# Patient Record
Sex: MALE | Race: WHITE | Employment: PART TIME | URBAN - METROPOLITAN AREA
[De-identification: names, ages, dates, MRNs, and addresses within clinical notes are randomized per-mention and may not be internally consistent; named-entity substitution may affect disease eponyms.]

---

## 2017-03-10 ENCOUNTER — HOSPITAL ENCOUNTER (OUTPATIENT)
Dept: SLEEP CENTER | Age: 72
Discharge: HOME | End: 2017-03-10
Attending: INTERNAL MEDICINE | Admitting: INTERNAL MEDICINE

## 2017-03-13 ENCOUNTER — HOSPITAL ENCOUNTER (OUTPATIENT)
Dept: SLEEP CENTER | Age: 72
Discharge: HOME | End: 2017-03-13
Attending: INTERNAL MEDICINE | Admitting: INTERNAL MEDICINE

## 2017-03-24 ENCOUNTER — HOSPITAL ENCOUNTER (OUTPATIENT)
Dept: SLEEP CENTER | Age: 72
Discharge: HOME | End: 2017-03-24
Attending: INTERNAL MEDICINE | Admitting: INTERNAL MEDICINE

## 2017-06-19 PROBLEM — Z99.89 OSA ON CPAP: Status: ACTIVE | Noted: 2017-06-19

## 2017-06-19 PROBLEM — G47.33 OSA ON CPAP: Status: ACTIVE | Noted: 2017-06-19

## 2019-09-11 ENCOUNTER — HOSPITAL ENCOUNTER (OUTPATIENT)
Dept: GENERAL RADIOLOGY | Age: 74
Discharge: HOME | End: 2019-09-11
Payer: MEDICARE

## 2019-09-11 DIAGNOSIS — R63.0 ALMOST NO APPETITE: ICD-10-CM

## 2019-09-11 PROCEDURE — 74240 X-RAY XM UPR GI TRC 1CNTRST: CPT

## 2020-01-01 ENCOUNTER — HOSPITAL ENCOUNTER (EMERGENCY)
Age: 75
Discharge: ANOTHER HOSPITAL | End: 2020-11-23
Attending: EMERGENCY MEDICINE
Payer: MEDICARE

## 2020-01-01 ENCOUNTER — NURSE TRIAGE (OUTPATIENT)
Dept: OTHER | Facility: CLINIC | Age: 75
End: 2020-01-01

## 2020-01-01 ENCOUNTER — APPOINTMENT (OUTPATIENT)
Dept: ULTRASOUND IMAGING | Age: 75
DRG: 870 | End: 2020-01-01
Attending: INTERNAL MEDICINE
Payer: MEDICARE

## 2020-01-01 ENCOUNTER — APPOINTMENT (OUTPATIENT)
Dept: GENERAL RADIOLOGY | Age: 75
DRG: 870 | End: 2020-01-01
Attending: INTERNAL MEDICINE
Payer: MEDICARE

## 2020-01-01 ENCOUNTER — ANESTHESIA (OUTPATIENT)
Dept: CARDIOVASCULAR ICU | Age: 75
DRG: 870 | End: 2020-01-01
Payer: MEDICARE

## 2020-01-01 ENCOUNTER — HOSPITAL ENCOUNTER (EMERGENCY)
Age: 75
Discharge: HOME | End: 2020-11-18
Attending: EMERGENCY MEDICINE
Payer: MEDICARE

## 2020-01-01 ENCOUNTER — APPOINTMENT (OUTPATIENT)
Dept: GENERAL RADIOLOGY | Age: 75
End: 2020-01-01
Payer: MEDICARE

## 2020-01-01 ENCOUNTER — CARE COORDINATION (OUTPATIENT)
Dept: CARE COORDINATION | Age: 75
End: 2020-01-01

## 2020-01-01 ENCOUNTER — APPOINTMENT (OUTPATIENT)
Dept: INTERVENTIONAL RADIOLOGY/VASCULAR | Age: 75
DRG: 870 | End: 2020-01-01
Attending: INTERNAL MEDICINE
Payer: MEDICARE

## 2020-01-01 ENCOUNTER — ANESTHESIA EVENT (OUTPATIENT)
Dept: CARDIOVASCULAR ICU | Age: 75
DRG: 870 | End: 2020-01-01
Payer: MEDICARE

## 2020-01-01 ENCOUNTER — HOSPITAL ENCOUNTER (INPATIENT)
Age: 75
LOS: 14 days | DRG: 870 | End: 2020-12-07
Attending: INTERNAL MEDICINE | Admitting: INTERNAL MEDICINE
Payer: MEDICARE

## 2020-01-01 ENCOUNTER — APPOINTMENT (OUTPATIENT)
Dept: CT IMAGING | Age: 75
End: 2020-01-01
Payer: MEDICARE

## 2020-01-01 VITALS
WEIGHT: 215 LBS | HEIGHT: 71 IN | TEMPERATURE: 98.9 F | DIASTOLIC BLOOD PRESSURE: 70 MMHG | HEART RATE: 81 BPM | OXYGEN SATURATION: 95 % | RESPIRATION RATE: 16 BRPM | SYSTOLIC BLOOD PRESSURE: 125 MMHG | BODY MASS INDEX: 30.1 KG/M2

## 2020-01-01 VITALS
OXYGEN SATURATION: 95 % | SYSTOLIC BLOOD PRESSURE: 115 MMHG | HEART RATE: 94 BPM | HEIGHT: 71 IN | WEIGHT: 215 LBS | DIASTOLIC BLOOD PRESSURE: 65 MMHG | RESPIRATION RATE: 27 BRPM | TEMPERATURE: 98.9 F | BODY MASS INDEX: 30.1 KG/M2

## 2020-01-01 VITALS
HEIGHT: 71 IN | SYSTOLIC BLOOD PRESSURE: 110 MMHG | OXYGEN SATURATION: 100 % | TEMPERATURE: 98.2 F | BODY MASS INDEX: 31.3 KG/M2 | RESPIRATION RATE: 22 BRPM | DIASTOLIC BLOOD PRESSURE: 52 MMHG | HEART RATE: 76 BPM | WEIGHT: 223.55 LBS

## 2020-01-01 LAB
1,3 BETA-D-GLUCAN INTERP: NEGATIVE
1,3 BETA-D-GLUCAN: <31 PG/ML
ABO/RH: NORMAL
ALBUMIN SERPL-MCNC: 1.8 GM/DL (ref 3.4–5)
ALBUMIN SERPL-MCNC: 2.2 GM/DL (ref 3.4–5)
ALBUMIN SERPL-MCNC: 2.4 GM/DL (ref 3.4–5)
ALBUMIN SERPL-MCNC: 2.5 GM/DL (ref 3.4–5)
ALBUMIN SERPL-MCNC: 2.8 GM/DL (ref 3.4–5)
ALBUMIN SERPL-MCNC: 2.9 GM/DL (ref 3.4–5)
ALBUMIN SERPL-MCNC: 3 GM/DL (ref 3.4–5)
ALBUMIN SERPL-MCNC: 3.1 GM/DL (ref 3.4–5)
ALBUMIN SERPL-MCNC: 3.2 GM/DL (ref 3.4–5)
ALBUMIN SERPL-MCNC: 3.2 GM/DL (ref 3.4–5)
ALBUMIN SERPL-MCNC: 3.3 GM/DL (ref 3.4–5)
ALBUMIN SERPL-MCNC: 3.5 GM/DL (ref 3.4–5)
ALP BLD-CCNC: 109 IU/L (ref 40–129)
ALP BLD-CCNC: 110 IU/L (ref 40–128)
ALP BLD-CCNC: 113 IU/L (ref 40–128)
ALP BLD-CCNC: 127 IU/L (ref 40–128)
ALP BLD-CCNC: 131 IU/L (ref 40–128)
ALP BLD-CCNC: 132 IU/L (ref 40–128)
ALP BLD-CCNC: 140 IU/L (ref 40–128)
ALP BLD-CCNC: 142 IU/L (ref 40–128)
ALP BLD-CCNC: 146 IU/L (ref 40–128)
ALP BLD-CCNC: 146 IU/L (ref 40–128)
ALP BLD-CCNC: 149 IU/L (ref 40–128)
ALP BLD-CCNC: 151 IU/L (ref 40–128)
ALP BLD-CCNC: 156 IU/L (ref 40–128)
ALP BLD-CCNC: 164 IU/L (ref 40–129)
ALP BLD-CCNC: 175 IU/L (ref 40–128)
ALP BLD-CCNC: 186 IU/L (ref 40–129)
ALP BLD-CCNC: 98 IU/L (ref 40–128)
ALT SERPL-CCNC: 33 U/L (ref 10–40)
ALT SERPL-CCNC: 35 U/L (ref 10–40)
ALT SERPL-CCNC: 38 U/L (ref 10–40)
ALT SERPL-CCNC: 39 U/L (ref 10–40)
ALT SERPL-CCNC: 39 U/L (ref 10–40)
ALT SERPL-CCNC: 40 U/L (ref 10–40)
ALT SERPL-CCNC: 42 U/L (ref 10–40)
ALT SERPL-CCNC: 43 U/L (ref 10–40)
ALT SERPL-CCNC: 44 U/L (ref 10–40)
ALT SERPL-CCNC: 47 U/L (ref 10–40)
ALT SERPL-CCNC: 50 U/L (ref 10–40)
ALT SERPL-CCNC: 52 U/L (ref 10–40)
ALT SERPL-CCNC: 55 U/L (ref 10–40)
ALT SERPL-CCNC: 58 U/L (ref 10–40)
ALT SERPL-CCNC: 68 U/L (ref 10–40)
ANION GAP SERPL CALCULATED.3IONS-SCNC: 10 MMOL/L (ref 4–16)
ANION GAP SERPL CALCULATED.3IONS-SCNC: 11 MMOL/L (ref 4–16)
ANION GAP SERPL CALCULATED.3IONS-SCNC: 11 MMOL/L (ref 4–16)
ANION GAP SERPL CALCULATED.3IONS-SCNC: 12 MMOL/L (ref 4–16)
ANION GAP SERPL CALCULATED.3IONS-SCNC: 13 MMOL/L (ref 4–16)
ANION GAP SERPL CALCULATED.3IONS-SCNC: 14 MMOL/L (ref 4–16)
ANION GAP SERPL CALCULATED.3IONS-SCNC: 15 MMOL/L (ref 4–16)
ANION GAP SERPL CALCULATED.3IONS-SCNC: 16 MMOL/L (ref 4–16)
ANION GAP SERPL CALCULATED.3IONS-SCNC: 16 MMOL/L (ref 4–16)
ANION GAP SERPL CALCULATED.3IONS-SCNC: 17 MMOL/L (ref 4–16)
ANION GAP SERPL CALCULATED.3IONS-SCNC: 18 MMOL/L (ref 4–16)
ANION GAP SERPL CALCULATED.3IONS-SCNC: 19 MMOL/L (ref 4–16)
ANION GAP SERPL CALCULATED.3IONS-SCNC: 21 MMOL/L (ref 4–16)
ANION GAP SERPL CALCULATED.3IONS-SCNC: 21 MMOL/L (ref 4–16)
ANION GAP SERPL CALCULATED.3IONS-SCNC: 22 MMOL/L (ref 4–16)
ANION GAP SERPL CALCULATED.3IONS-SCNC: 22 MMOL/L (ref 4–16)
ANION GAP SERPL CALCULATED.3IONS-SCNC: 5 MMOL/L (ref 4–16)
ANION GAP SERPL CALCULATED.3IONS-SCNC: 8 MMOL/L (ref 4–16)
ANISOCYTOSIS: ABNORMAL
ANTIBODY SCREEN: NEGATIVE
APTT: 123.1 SECONDS (ref 25.1–37.1)
APTT: 22.3 SECONDS (ref 25.1–37.1)
APTT: 25.6 SECONDS (ref 25.1–37.1)
APTT: 27.6 SECONDS (ref 25.1–37.1)
APTT: 29 SECONDS (ref 25.1–37.1)
APTT: 29.7 SECONDS (ref 25.1–37.1)
APTT: 29.7 SECONDS (ref 25.1–37.1)
APTT: 30 SECONDS (ref 25.1–37.1)
APTT: 30.1 SECONDS (ref 25.1–37.1)
APTT: 30.5 SECONDS (ref 25.1–37.1)
APTT: 31.2 SECONDS (ref 25.1–37.1)
APTT: 32.6 SECONDS (ref 25.1–37.1)
APTT: 34.4 SECONDS (ref 25.1–37.1)
APTT: 43.8 SECONDS (ref 25.1–37.1)
APTT: 81.2 SECONDS (ref 25.1–37.1)
AST SERPL-CCNC: 115 IU/L (ref 15–37)
AST SERPL-CCNC: 121 IU/L (ref 15–37)
AST SERPL-CCNC: 48 IU/L (ref 15–37)
AST SERPL-CCNC: 49 IU/L (ref 15–37)
AST SERPL-CCNC: 49 IU/L (ref 15–37)
AST SERPL-CCNC: 51 IU/L (ref 15–37)
AST SERPL-CCNC: 53 IU/L (ref 15–37)
AST SERPL-CCNC: 54 IU/L (ref 15–37)
AST SERPL-CCNC: 64 IU/L (ref 15–37)
AST SERPL-CCNC: 69 IU/L (ref 15–37)
AST SERPL-CCNC: 70 IU/L (ref 15–37)
AST SERPL-CCNC: 79 IU/L (ref 15–37)
AST SERPL-CCNC: 82 IU/L (ref 15–37)
AST SERPL-CCNC: 83 IU/L (ref 15–37)
AST SERPL-CCNC: 91 IU/L (ref 15–37)
AST SERPL-CCNC: 92 IU/L (ref 15–37)
AST SERPL-CCNC: 95 IU/L (ref 15–37)
BACTERIA: ABNORMAL /HPF
BACTERIA: ABNORMAL /HPF
BANDED NEUTROPHILS ABSOLUTE COUNT: 0.68 K/CU MM
BANDED NEUTROPHILS ABSOLUTE COUNT: 0.79 K/CU MM
BANDED NEUTROPHILS ABSOLUTE COUNT: 1.62 K/CU MM
BANDED NEUTROPHILS ABSOLUTE COUNT: 1.67 K/CU MM
BANDED NEUTROPHILS ABSOLUTE COUNT: 1.68 K/CU MM
BANDED NEUTROPHILS ABSOLUTE COUNT: 1.74 K/CU MM
BANDED NEUTROPHILS ABSOLUTE COUNT: 1.81 K/CU MM
BANDED NEUTROPHILS ABSOLUTE COUNT: 2.29 K/CU MM
BANDED NEUTROPHILS ABSOLUTE COUNT: 2.3 K/CU MM
BANDED NEUTROPHILS ABSOLUTE COUNT: 2.44 K/CU MM
BANDED NEUTROPHILS ABSOLUTE COUNT: 2.58 K/CU MM
BANDED NEUTROPHILS ABSOLUTE COUNT: 2.71 K/CU MM
BANDED NEUTROPHILS ABSOLUTE COUNT: 3.14 K/CU MM
BANDED NEUTROPHILS RELATIVE PERCENT: 10 % (ref 5–11)
BANDED NEUTROPHILS RELATIVE PERCENT: 10 % (ref 5–11)
BANDED NEUTROPHILS RELATIVE PERCENT: 12 % (ref 5–11)
BANDED NEUTROPHILS RELATIVE PERCENT: 13 % (ref 5–11)
BANDED NEUTROPHILS RELATIVE PERCENT: 17 % (ref 5–11)
BANDED NEUTROPHILS RELATIVE PERCENT: 2 % (ref 5–11)
BANDED NEUTROPHILS RELATIVE PERCENT: 5 % (ref 5–11)
BANDED NEUTROPHILS RELATIVE PERCENT: 5 % (ref 5–11)
BANDED NEUTROPHILS RELATIVE PERCENT: 8 % (ref 5–11)
BANDED NEUTROPHILS RELATIVE PERCENT: 9 % (ref 5–11)
BANDED NEUTROPHILS RELATIVE PERCENT: 9 % (ref 5–11)
BASE EXCESS MIXED: 1 (ref 0–1.2)
BASE EXCESS MIXED: 1.1 (ref 0–1.2)
BASE EXCESS: 0 (ref 0–3.3)
BASE EXCESS: 0 (ref 0–3.3)
BASE EXCESS: 1 (ref 0–3.3)
BASE EXCESS: 10 (ref 0–3.3)
BASE EXCESS: 13 (ref 0–3.3)
BASE EXCESS: 2 (ref 0–3.3)
BASE EXCESS: 3 (ref 0–3.3)
BASE EXCESS: 3 (ref 0–3.3)
BASE EXCESS: 4 (ref 0–3.3)
BASE EXCESS: 5 (ref 0–3.3)
BASE EXCESS: 5 (ref 0–3.3)
BASE EXCESS: 6 (ref 0–3.3)
BASE EXCESS: 6 (ref 0–3.3)
BASE EXCESS: 7 (ref 0–3.3)
BASE EXCESS: 8 (ref 0–3.3)
BASE EXCESS: 9 (ref 0–3.3)
BASE EXCESS: 9 (ref 0–3.3)
BASE EXCESS: ABNORMAL (ref 0–3.3)
BASOPHILS ABSOLUTE: 0 K/CU MM
BASOPHILS ABSOLUTE: 0.2 K/CU MM
BASOPHILS RELATIVE PERCENT: 0.2 % (ref 0–1)
BASOPHILS RELATIVE PERCENT: 0.3 % (ref 0–1)
BASOPHILS RELATIVE PERCENT: 1 % (ref 0–1)
BILIRUB SERPL-MCNC: 0.3 MG/DL (ref 0–1)
BILIRUB SERPL-MCNC: 0.4 MG/DL (ref 0–1)
BILIRUB SERPL-MCNC: 0.5 MG/DL (ref 0–1)
BILIRUB SERPL-MCNC: 1 MG/DL (ref 0–1)
BILIRUBIN URINE: NEGATIVE MG/DL
BILIRUBIN URINE: NEGATIVE MG/DL
BLOOD, URINE: ABNORMAL
BLOOD, URINE: ABNORMAL
BUN BLDV-MCNC: 109 MG/DL (ref 6–23)
BUN BLDV-MCNC: 133 MG/DL (ref 6–23)
BUN BLDV-MCNC: 134 MG/DL (ref 6–23)
BUN BLDV-MCNC: 18 MG/DL (ref 6–23)
BUN BLDV-MCNC: 23 MG/DL (ref 6–23)
BUN BLDV-MCNC: 23 MG/DL (ref 6–23)
BUN BLDV-MCNC: 24 MG/DL (ref 6–23)
BUN BLDV-MCNC: 28 MG/DL (ref 6–23)
BUN BLDV-MCNC: 29 MG/DL (ref 6–23)
BUN BLDV-MCNC: 31 MG/DL (ref 6–23)
BUN BLDV-MCNC: 32 MG/DL (ref 6–23)
BUN BLDV-MCNC: 33 MG/DL (ref 6–23)
BUN BLDV-MCNC: 33 MG/DL (ref 6–23)
BUN BLDV-MCNC: 36 MG/DL (ref 6–23)
BUN BLDV-MCNC: 36 MG/DL (ref 6–23)
BUN BLDV-MCNC: 37 MG/DL (ref 6–23)
BUN BLDV-MCNC: 38 MG/DL (ref 6–23)
BUN BLDV-MCNC: 39 MG/DL (ref 6–23)
BUN BLDV-MCNC: 41 MG/DL (ref 6–23)
BUN BLDV-MCNC: 43 MG/DL (ref 6–23)
BUN BLDV-MCNC: 44 MG/DL (ref 6–23)
BUN BLDV-MCNC: 46 MG/DL (ref 6–23)
BUN BLDV-MCNC: 49 MG/DL (ref 6–23)
BUN BLDV-MCNC: 51 MG/DL (ref 6–23)
BUN BLDV-MCNC: 55 MG/DL (ref 6–23)
BUN BLDV-MCNC: 57 MG/DL (ref 6–23)
BUN BLDV-MCNC: 58 MG/DL (ref 6–23)
BUN BLDV-MCNC: 65 MG/DL (ref 6–23)
BUN BLDV-MCNC: 73 MG/DL (ref 6–23)
BUN BLDV-MCNC: 74 MG/DL (ref 6–23)
BUN BLDV-MCNC: 77 MG/DL (ref 6–23)
BUN BLDV-MCNC: 79 MG/DL (ref 6–23)
BUN BLDV-MCNC: 89 MG/DL (ref 6–23)
BUN BLDV-MCNC: 99 MG/DL (ref 6–23)
BURR CELLS: ABNORMAL
BURR CELLS: ABNORMAL
CALCIUM IONIZED: 3.48 MG/DL (ref 4.48–5.28)
CALCIUM IONIZED: 3.68 MG/DL (ref 4.48–5.28)
CALCIUM IONIZED: 3.72 MG/DL (ref 4.48–5.28)
CALCIUM IONIZED: 3.8 MG/DL (ref 4.48–5.28)
CALCIUM IONIZED: 3.8 MG/DL (ref 4.48–5.28)
CALCIUM IONIZED: 3.84 MG/DL (ref 4.48–5.28)
CALCIUM IONIZED: 3.88 MG/DL (ref 4.48–5.28)
CALCIUM IONIZED: 3.92 MG/DL (ref 4.48–5.28)
CALCIUM IONIZED: 3.96 MG/DL (ref 4.48–5.28)
CALCIUM IONIZED: 3.96 MG/DL (ref 4.48–5.28)
CALCIUM IONIZED: 4 MG/DL (ref 4.48–5.28)
CALCIUM IONIZED: 4 MG/DL (ref 4.48–5.28)
CALCIUM IONIZED: 4.04 MG/DL (ref 4.48–5.28)
CALCIUM IONIZED: 4.04 MG/DL (ref 4.48–5.28)
CALCIUM IONIZED: 4.08 MG/DL (ref 4.48–5.28)
CALCIUM IONIZED: 4.12 MG/DL (ref 4.48–5.28)
CALCIUM IONIZED: 4.16 MG/DL (ref 4.48–5.28)
CALCIUM IONIZED: 4.2 MG/DL (ref 4.48–5.28)
CALCIUM IONIZED: 4.24 MG/DL (ref 4.48–5.28)
CALCIUM IONIZED: 4.24 MG/DL (ref 4.48–5.28)
CALCIUM IONIZED: 4.36 MG/DL (ref 4.48–5.28)
CALCIUM SERPL-MCNC: 5.7 MG/DL (ref 8.3–10.6)
CALCIUM SERPL-MCNC: 6.4 MG/DL (ref 8.3–10.6)
CALCIUM SERPL-MCNC: 6.7 MG/DL (ref 8.3–10.6)
CALCIUM SERPL-MCNC: 6.9 MG/DL (ref 8.3–10.6)
CALCIUM SERPL-MCNC: 7 MG/DL (ref 8.3–10.6)
CALCIUM SERPL-MCNC: 7.1 MG/DL (ref 8.3–10.6)
CALCIUM SERPL-MCNC: 7.2 MG/DL (ref 8.3–10.6)
CALCIUM SERPL-MCNC: 7.2 MG/DL (ref 8.3–10.6)
CALCIUM SERPL-MCNC: 7.3 MG/DL (ref 8.3–10.6)
CALCIUM SERPL-MCNC: 7.4 MG/DL (ref 8.3–10.6)
CALCIUM SERPL-MCNC: 7.5 MG/DL (ref 8.3–10.6)
CALCIUM SERPL-MCNC: 7.6 MG/DL (ref 8.3–10.6)
CALCIUM SERPL-MCNC: 7.6 MG/DL (ref 8.3–10.6)
CALCIUM SERPL-MCNC: 7.7 MG/DL (ref 8.3–10.6)
CALCIUM SERPL-MCNC: 7.8 MG/DL (ref 8.3–10.6)
CALCIUM SERPL-MCNC: 7.9 MG/DL (ref 8.3–10.6)
CALCIUM SERPL-MCNC: 7.9 MG/DL (ref 8.3–10.6)
CALCIUM SERPL-MCNC: 8 MG/DL (ref 8.3–10.6)
CALCIUM SERPL-MCNC: 8.2 MG/DL (ref 8.3–10.6)
CARBON MONOXIDE, BLOOD: 1.5 % (ref 0–5)
CARBON MONOXIDE, BLOOD: 1.5 % (ref 0–5)
CARBON MONOXIDE, BLOOD: 1.6 % (ref 0–5)
CARBON MONOXIDE, BLOOD: 1.6 % (ref 0–5)
CARBON MONOXIDE, BLOOD: 1.7 % (ref 0–5)
CARBON MONOXIDE, BLOOD: 1.8 % (ref 0–5)
CARBON MONOXIDE, BLOOD: 1.9 % (ref 0–5)
CARBON MONOXIDE, BLOOD: 1.9 % (ref 0–5)
CARBON MONOXIDE, BLOOD: 2 % (ref 0–5)
CARBON MONOXIDE, BLOOD: 2.1 % (ref 0–5)
CARBON MONOXIDE, BLOOD: 2.1 % (ref 0–5)
CARBON MONOXIDE, BLOOD: 2.2 % (ref 0–5)
CARBON MONOXIDE, BLOOD: 2.6 % (ref 0–5)
CAST TYPE: ABNORMAL /HPF
CAST TYPE: NEGATIVE /HPF
CHLORIDE BLD-SCNC: 101 MMOL/L (ref 99–110)
CHLORIDE BLD-SCNC: 101 MMOL/L (ref 99–110)
CHLORIDE BLD-SCNC: 103 MMOL/L (ref 99–110)
CHLORIDE BLD-SCNC: 104 MMOL/L (ref 99–110)
CHLORIDE BLD-SCNC: 105 MMOL/L (ref 99–110)
CHLORIDE BLD-SCNC: 106 MMOL/L (ref 99–110)
CHLORIDE BLD-SCNC: 91 MMOL/L (ref 99–110)
CHLORIDE BLD-SCNC: 91 MMOL/L (ref 99–110)
CHLORIDE BLD-SCNC: 92 MMOL/L (ref 99–110)
CHLORIDE BLD-SCNC: 92 MMOL/L (ref 99–110)
CHLORIDE BLD-SCNC: 93 MMOL/L (ref 99–110)
CHLORIDE BLD-SCNC: 94 MMOL/L (ref 99–110)
CHLORIDE BLD-SCNC: 95 MMOL/L (ref 99–110)
CHLORIDE BLD-SCNC: 95 MMOL/L (ref 99–110)
CHLORIDE BLD-SCNC: 96 MMOL/L (ref 99–110)
CHLORIDE BLD-SCNC: 97 MMOL/L (ref 99–110)
CHLORIDE BLD-SCNC: 98 MMOL/L (ref 99–110)
CHLORIDE BLD-SCNC: 99 MMOL/L (ref 99–110)
CLARITY: ABNORMAL
CLARITY: CLEAR
CO2 CONTENT: 18.7 MMOL/L (ref 19–24)
CO2 CONTENT: 18.9 MMOL/L (ref 19–24)
CO2 CONTENT: 19.7 MMOL/L (ref 19–24)
CO2 CONTENT: 20.9 MMOL/L (ref 19–24)
CO2 CONTENT: 21.8 MMOL/L (ref 19–24)
CO2 CONTENT: 22 MMOL/L (ref 19–24)
CO2 CONTENT: 22.3 MMOL/L (ref 19–24)
CO2 CONTENT: 22.5 MMOL/L (ref 19–24)
CO2 CONTENT: 22.8 MMOL/L (ref 19–24)
CO2 CONTENT: 23.4 MMOL/L (ref 19–24)
CO2 CONTENT: 23.4 MMOL/L (ref 19–24)
CO2 CONTENT: 23.5 MMOL/L (ref 19–24)
CO2 CONTENT: 23.6 MMOL/L (ref 19–24)
CO2 CONTENT: 24.9 MMOL/L (ref 19–24)
CO2 CONTENT: 25.1 MMOL/L (ref 19–24)
CO2 CONTENT: 26.2 MMOL/L (ref 19–24)
CO2 CONTENT: 26.4 MMOL/L (ref 19–24)
CO2 CONTENT: 26.5 MMOL/L (ref 19–24)
CO2 CONTENT: 27.9 MMOL/L (ref 19–24)
CO2 CONTENT: 28.9 MMOL/L (ref 19–24)
CO2 CONTENT: 30.5 MMOL/L (ref 19–24)
CO2: 16 MMOL/L (ref 21–32)
CO2: 16 MMOL/L (ref 21–32)
CO2: 17 MMOL/L (ref 21–32)
CO2: 17 MMOL/L (ref 21–32)
CO2: 18 MMOL/L (ref 21–32)
CO2: 19 MMOL/L (ref 21–32)
CO2: 20 MMOL/L (ref 21–32)
CO2: 21 MMOL/L (ref 21–32)
CO2: 21 MMOL/L (ref 21–32)
CO2: 22 MMOL/L (ref 21–32)
CO2: 23 MMOL/L (ref 21–32)
CO2: 24 MMOL/L (ref 21–32)
CO2: 25 MMOL/L (ref 21–32)
CO2: 26 MMOL/L (ref 21–32)
CO2: 26 MMOL/L (ref 21–32)
CO2: 31 MMOL/L (ref 21–32)
CO2: 34 MMOL/L (ref 21–32)
COLOR: YELLOW
COLOR: YELLOW
COMMENT: ABNORMAL
COMPONENT: NORMAL
CREAT SERPL-MCNC: 0.9 MG/DL (ref 0.9–1.3)
CREAT SERPL-MCNC: 1 MG/DL (ref 0.9–1.3)
CREAT SERPL-MCNC: 1 MG/DL (ref 0.9–1.3)
CREAT SERPL-MCNC: 1.1 MG/DL (ref 0.9–1.3)
CREAT SERPL-MCNC: 1.1 MG/DL (ref 0.9–1.3)
CREAT SERPL-MCNC: 1.2 MG/DL (ref 0.9–1.3)
CREAT SERPL-MCNC: 1.3 MG/DL (ref 0.9–1.3)
CREAT SERPL-MCNC: 1.3 MG/DL (ref 0.9–1.3)
CREAT SERPL-MCNC: 1.4 MG/DL (ref 0.9–1.3)
CREAT SERPL-MCNC: 1.5 MG/DL (ref 0.9–1.3)
CREAT SERPL-MCNC: 1.6 MG/DL (ref 0.9–1.3)
CREAT SERPL-MCNC: 1.7 MG/DL (ref 0.9–1.3)
CREAT SERPL-MCNC: 1.7 MG/DL (ref 0.9–1.3)
CREAT SERPL-MCNC: 1.8 MG/DL (ref 0.9–1.3)
CREAT SERPL-MCNC: 1.9 MG/DL (ref 0.9–1.3)
CREAT SERPL-MCNC: 1.9 MG/DL (ref 0.9–1.3)
CREAT SERPL-MCNC: 2 MG/DL (ref 0.9–1.3)
CREAT SERPL-MCNC: 2.1 MG/DL (ref 0.9–1.3)
CREAT SERPL-MCNC: 2.3 MG/DL (ref 0.9–1.3)
CREAT SERPL-MCNC: 2.4 MG/DL (ref 0.9–1.3)
CREAT SERPL-MCNC: 2.5 MG/DL (ref 0.9–1.3)
CREAT SERPL-MCNC: 2.5 MG/DL (ref 0.9–1.3)
CREAT SERPL-MCNC: 3 MG/DL (ref 0.9–1.3)
CREAT SERPL-MCNC: 3 MG/DL (ref 0.9–1.3)
CREAT SERPL-MCNC: 3.2 MG/DL (ref 0.9–1.3)
CREAT SERPL-MCNC: 3.3 MG/DL (ref 0.9–1.3)
CREAT SERPL-MCNC: 3.7 MG/DL (ref 0.9–1.3)
CREAT SERPL-MCNC: 4.1 MG/DL (ref 0.9–1.3)
CREATININE URINE: 102.7 MG/DL (ref 39–259)
CRYSTAL TYPE: ABNORMAL /HPF
CRYSTAL TYPE: NEGATIVE /HPF
CULTURE: ABNORMAL
CULTURE: ABNORMAL
CULTURE: NORMAL
D DIMER: 1363 NG/ML(DDU)
D DIMER: 2415 NG/ML(DDU)
D DIMER: 4040 NG/ML(DDU)
D DIMER: 4319 NG/ML(DDU)
D DIMER: 4876 NG/ML(DDU)
D DIMER: 4995 NG/ML(DDU)
D DIMER: 5186 NG/ML(DDU)
D DIMER: 680 NG/ML(DDU)
D DIMER: 744 NG/ML(DDU)
D DIMER: >5250 NG/ML(DDU)
DIFFERENTIAL TYPE: ABNORMAL
DOHLE BODIES: PRESENT
DOSE AMOUNT: ABNORMAL
DOSE AMOUNT: NORMAL
DOSE TIME: ABNORMAL
DOSE TIME: NORMAL
EKG ATRIAL RATE: 109 BPM
EKG ATRIAL RATE: 153 BPM
EKG ATRIAL RATE: 54 BPM
EKG ATRIAL RATE: 68 BPM
EKG DIAGNOSIS: NORMAL
EKG P AXIS: 37 DEGREES
EKG P AXIS: 43 DEGREES
EKG P AXIS: 65 DEGREES
EKG P-R INTERVAL: 132 MS
EKG P-R INTERVAL: 148 MS
EKG P-R INTERVAL: 150 MS
EKG Q-T INTERVAL: 314 MS
EKG Q-T INTERVAL: 398 MS
EKG Q-T INTERVAL: 458 MS
EKG Q-T INTERVAL: 464 MS
EKG QRS DURATION: 132 MS
EKG QRS DURATION: 70 MS
EKG QRS DURATION: 80 MS
EKG QRS DURATION: 86 MS
EKG QTC CALCULATION (BAZETT): 422 MS
EKG QTC CALCULATION (BAZETT): 423 MS
EKG QTC CALCULATION (BAZETT): 434 MS
EKG QTC CALCULATION (BAZETT): 508 MS
EKG R AXIS: -64 DEGREES
EKG R AXIS: 42 DEGREES
EKG R AXIS: 74 DEGREES
EKG R AXIS: 84 DEGREES
EKG T AXIS: 111 DEGREES
EKG T AXIS: 26 DEGREES
EKG T AXIS: 66 DEGREES
EKG T AXIS: 67 DEGREES
EKG VENTRICULAR RATE: 109 BPM
EKG VENTRICULAR RATE: 54 BPM
EKG VENTRICULAR RATE: 68 BPM
EKG VENTRICULAR RATE: 72 BPM
EOSINOPHILS ABSOLUTE: 0 K/CU MM
EOSINOPHILS ABSOLUTE: 0.2 K/CU MM
EOSINOPHILS ABSOLUTE: 0.2 K/CU MM
EOSINOPHILS ABSOLUTE: 0.3 K/CU MM
EOSINOPHILS RELATIVE PERCENT: 0 % (ref 0–3)
EOSINOPHILS RELATIVE PERCENT: 0.1 % (ref 0–3)
EOSINOPHILS RELATIVE PERCENT: 0.1 % (ref 0–3)
EOSINOPHILS RELATIVE PERCENT: 0.8 % (ref 0–3)
EOSINOPHILS RELATIVE PERCENT: 1 % (ref 0–3)
EOSINOPHILS RELATIVE PERCENT: 1 % (ref 0–3)
EPITHELIAL CELLS, UA: ABNORMAL /HPF
EPITHELIAL CELLS, UA: ABNORMAL /HPF
FERRITIN: 2144 NG/ML (ref 30–400)
FIBRINOGEN LEVEL: 174 MG/DL (ref 196.9–442.1)
FIBRINOGEN LEVEL: 177 MG/DL (ref 196.9–442.1)
FIBRINOGEN LEVEL: 181 MG/DL (ref 196.9–442.1)
FIBRINOGEN LEVEL: 189 MG/DL (ref 196.9–442.1)
FIBRINOGEN LEVEL: 194 MG/DL (ref 196.9–442.1)
FIBRINOGEN LEVEL: 196 MG/DL (ref 196.9–442.1)
FIBRINOGEN LEVEL: 203 MG/DL (ref 196.9–442.1)
FIBRINOGEN LEVEL: 217 MG/DL (ref 196.9–442.1)
FIBRINOGEN LEVEL: 219 MG/DL (ref 196.9–442.1)
FIBRINOGEN LEVEL: 223 MG/DL (ref 196.9–442.1)
FIBRINOGEN LEVEL: 254 MG/DL (ref 196.9–442.1)
FIBRINOGEN LEVEL: 298 MG/DL (ref 196.9–442.1)
FIBRINOGEN LEVEL: 429 MG/DL (ref 196.9–442.1)
FIBRINOGEN LEVEL: 504 MG/DL (ref 196.9–442.1)
FIBRINOGEN LEVEL: 653 MG/DL (ref 196.9–442.1)
GFR AFRICAN AMERICAN: 17 ML/MIN/1.73M2
GFR AFRICAN AMERICAN: 19 ML/MIN/1.73M2
GFR AFRICAN AMERICAN: 22 ML/MIN/1.73M2
GFR AFRICAN AMERICAN: 23 ML/MIN/1.73M2
GFR AFRICAN AMERICAN: 25 ML/MIN/1.73M2
GFR AFRICAN AMERICAN: 25 ML/MIN/1.73M2
GFR AFRICAN AMERICAN: 31 ML/MIN/1.73M2
GFR AFRICAN AMERICAN: 31 ML/MIN/1.73M2
GFR AFRICAN AMERICAN: 32 ML/MIN/1.73M2
GFR AFRICAN AMERICAN: 34 ML/MIN/1.73M2
GFR AFRICAN AMERICAN: 37 ML/MIN/1.73M2
GFR AFRICAN AMERICAN: 40 ML/MIN/1.73M2
GFR AFRICAN AMERICAN: 42 ML/MIN/1.73M2
GFR AFRICAN AMERICAN: 42 ML/MIN/1.73M2
GFR AFRICAN AMERICAN: 45 ML/MIN/1.73M2
GFR AFRICAN AMERICAN: 48 ML/MIN/1.73M2
GFR AFRICAN AMERICAN: 48 ML/MIN/1.73M2
GFR AFRICAN AMERICAN: 51 ML/MIN/1.73M2
GFR AFRICAN AMERICAN: 55 ML/MIN/1.73M2
GFR AFRICAN AMERICAN: 60 ML/MIN/1.73M2
GFR AFRICAN AMERICAN: >60 ML/MIN/1.73M2
GFR NON-AFRICAN AMERICAN: 14 ML/MIN/1.73M2
GFR NON-AFRICAN AMERICAN: 16 ML/MIN/1.73M2
GFR NON-AFRICAN AMERICAN: 18 ML/MIN/1.73M2
GFR NON-AFRICAN AMERICAN: 19 ML/MIN/1.73M2
GFR NON-AFRICAN AMERICAN: 21 ML/MIN/1.73M2
GFR NON-AFRICAN AMERICAN: 21 ML/MIN/1.73M2
GFR NON-AFRICAN AMERICAN: 25 ML/MIN/1.73M2
GFR NON-AFRICAN AMERICAN: 25 ML/MIN/1.73M2
GFR NON-AFRICAN AMERICAN: 27 ML/MIN/1.73M2
GFR NON-AFRICAN AMERICAN: 28 ML/MIN/1.73M2
GFR NON-AFRICAN AMERICAN: 31 ML/MIN/1.73M2
GFR NON-AFRICAN AMERICAN: 33 ML/MIN/1.73M2
GFR NON-AFRICAN AMERICAN: 35 ML/MIN/1.73M2
GFR NON-AFRICAN AMERICAN: 35 ML/MIN/1.73M2
GFR NON-AFRICAN AMERICAN: 37 ML/MIN/1.73M2
GFR NON-AFRICAN AMERICAN: 39 ML/MIN/1.73M2
GFR NON-AFRICAN AMERICAN: 39 ML/MIN/1.73M2
GFR NON-AFRICAN AMERICAN: 42 ML/MIN/1.73M2
GFR NON-AFRICAN AMERICAN: 46 ML/MIN/1.73M2
GFR NON-AFRICAN AMERICAN: 49 ML/MIN/1.73M2
GFR NON-AFRICAN AMERICAN: 54 ML/MIN/1.73M2
GFR NON-AFRICAN AMERICAN: 54 ML/MIN/1.73M2
GFR NON-AFRICAN AMERICAN: 59 ML/MIN/1.73M2
GFR NON-AFRICAN AMERICAN: >60 ML/MIN/1.73M2
GIANT PLATELETS: PRESENT
GLUCOSE BLD-MCNC: 100 MG/DL (ref 70–99)
GLUCOSE BLD-MCNC: 103 MG/DL (ref 70–99)
GLUCOSE BLD-MCNC: 104 MG/DL (ref 70–99)
GLUCOSE BLD-MCNC: 106 MG/DL (ref 70–99)
GLUCOSE BLD-MCNC: 107 MG/DL (ref 70–99)
GLUCOSE BLD-MCNC: 109 MG/DL (ref 70–99)
GLUCOSE BLD-MCNC: 109 MG/DL (ref 70–99)
GLUCOSE BLD-MCNC: 110 MG/DL (ref 70–99)
GLUCOSE BLD-MCNC: 110 MG/DL (ref 70–99)
GLUCOSE BLD-MCNC: 111 MG/DL (ref 70–99)
GLUCOSE BLD-MCNC: 111 MG/DL (ref 70–99)
GLUCOSE BLD-MCNC: 113 MG/DL (ref 70–99)
GLUCOSE BLD-MCNC: 114 MG/DL (ref 70–99)
GLUCOSE BLD-MCNC: 114 MG/DL (ref 70–99)
GLUCOSE BLD-MCNC: 115 MG/DL (ref 70–99)
GLUCOSE BLD-MCNC: 116 MG/DL (ref 70–99)
GLUCOSE BLD-MCNC: 117 MG/DL (ref 70–99)
GLUCOSE BLD-MCNC: 117 MG/DL (ref 70–99)
GLUCOSE BLD-MCNC: 119 MG/DL (ref 70–99)
GLUCOSE BLD-MCNC: 123 MG/DL (ref 70–99)
GLUCOSE BLD-MCNC: 125 MG/DL (ref 70–99)
GLUCOSE BLD-MCNC: 126 MG/DL (ref 70–99)
GLUCOSE BLD-MCNC: 126 MG/DL (ref 70–99)
GLUCOSE BLD-MCNC: 127 MG/DL (ref 70–99)
GLUCOSE BLD-MCNC: 128 MG/DL (ref 70–99)
GLUCOSE BLD-MCNC: 129 MG/DL (ref 70–99)
GLUCOSE BLD-MCNC: 130 MG/DL (ref 70–99)
GLUCOSE BLD-MCNC: 131 MG/DL (ref 70–99)
GLUCOSE BLD-MCNC: 131 MG/DL (ref 70–99)
GLUCOSE BLD-MCNC: 135 MG/DL (ref 70–99)
GLUCOSE BLD-MCNC: 136 MG/DL (ref 70–99)
GLUCOSE BLD-MCNC: 136 MG/DL (ref 70–99)
GLUCOSE BLD-MCNC: 140 MG/DL (ref 70–99)
GLUCOSE BLD-MCNC: 143 MG/DL (ref 70–99)
GLUCOSE BLD-MCNC: 148 MG/DL (ref 70–99)
GLUCOSE BLD-MCNC: 150 MG/DL (ref 70–99)
GLUCOSE BLD-MCNC: 153 MG/DL (ref 70–99)
GLUCOSE BLD-MCNC: 153 MG/DL (ref 70–99)
GLUCOSE BLD-MCNC: 155 MG/DL (ref 70–99)
GLUCOSE BLD-MCNC: 159 MG/DL (ref 70–99)
GLUCOSE BLD-MCNC: 169 MG/DL (ref 70–99)
GLUCOSE BLD-MCNC: 213 MG/DL (ref 70–99)
GLUCOSE BLD-MCNC: 80 MG/DL (ref 70–99)
GLUCOSE BLD-MCNC: 84 MG/DL (ref 70–99)
GLUCOSE BLD-MCNC: 85 MG/DL (ref 70–99)
GLUCOSE BLD-MCNC: 87 MG/DL (ref 70–99)
GLUCOSE BLD-MCNC: 94 MG/DL (ref 70–99)
GLUCOSE BLD-MCNC: 95 MG/DL (ref 70–99)
GLUCOSE BLD-MCNC: 96 MG/DL (ref 70–99)
GLUCOSE, URINE: NEGATIVE MG/DL
GLUCOSE, URINE: NEGATIVE MG/DL
GRAM SMEAR: ABNORMAL
GRAM SMEAR: NORMAL
HCO3 ARTERIAL: 17.5 MMOL/L (ref 18–23)
HCO3 ARTERIAL: 17.6 MMOL/L (ref 18–23)
HCO3 ARTERIAL: 17.8 MMOL/L (ref 18–23)
HCO3 ARTERIAL: 19.8 MMOL/L (ref 18–23)
HCO3 ARTERIAL: 20.3 MMOL/L (ref 18–23)
HCO3 ARTERIAL: 20.6 MMOL/L (ref 18–23)
HCO3 ARTERIAL: 20.6 MMOL/L (ref 18–23)
HCO3 ARTERIAL: 21.1 MMOL/L (ref 18–23)
HCO3 ARTERIAL: 21.7 MMOL/L (ref 18–23)
HCO3 ARTERIAL: 22.1 MMOL/L (ref 18–23)
HCO3 ARTERIAL: 22.2 MMOL/L (ref 18–23)
HCO3 ARTERIAL: 22.2 MMOL/L (ref 18–23)
HCO3 ARTERIAL: 22.3 MMOL/L (ref 18–23)
HCO3 ARTERIAL: 23 MMOL/L (ref 18–23)
HCO3 ARTERIAL: 23.1 MMOL/L (ref 18–23)
HCO3 ARTERIAL: 24.8 MMOL/L (ref 18–23)
HCO3 ARTERIAL: 24.9 MMOL/L (ref 18–23)
HCO3 ARTERIAL: 25.2 MMOL/L (ref 18–23)
HCO3 ARTERIAL: 27.2 MMOL/L (ref 18–23)
HCO3 ARTERIAL: 28.5 MMOL/L (ref 18–23)
HCO3 VENOUS: 26.6 MMOL/L (ref 19–25)
HCT VFR BLD CALC: 25.3 % (ref 42–52)
HCT VFR BLD CALC: 26.8 % (ref 42–52)
HCT VFR BLD CALC: 27.3 % (ref 42–52)
HCT VFR BLD CALC: 27.6 % (ref 42–52)
HCT VFR BLD CALC: 28.7 % (ref 42–52)
HCT VFR BLD CALC: 29.7 % (ref 42–52)
HCT VFR BLD CALC: 30.9 % (ref 42–52)
HCT VFR BLD CALC: 34.9 % (ref 42–52)
HCT VFR BLD CALC: 35.9 % (ref 42–52)
HCT VFR BLD CALC: 36.6 % (ref 42–52)
HCT VFR BLD CALC: 37.3 % (ref 42–52)
HCT VFR BLD CALC: 39.8 % (ref 42–52)
HCT VFR BLD CALC: 40.9 % (ref 42–52)
HCT VFR BLD CALC: 41 % (ref 42–52)
HCT VFR BLD CALC: 41.9 % (ref 42–52)
HCT VFR BLD CALC: 46 % (ref 42–52)
HCT VFR BLD CALC: 46.3 % (ref 42–52)
HCT VFR BLD CALC: 46.3 % (ref 42–52)
HEMOGLOBIN: 10 GM/DL (ref 13.5–18)
HEMOGLOBIN: 10 GM/DL (ref 13.5–18)
HEMOGLOBIN: 11.6 GM/DL (ref 13.5–18)
HEMOGLOBIN: 11.7 GM/DL (ref 13.5–18)
HEMOGLOBIN: 11.8 GM/DL (ref 13.5–18)
HEMOGLOBIN: 12 GM/DL (ref 13.5–18)
HEMOGLOBIN: 12.5 GM/DL (ref 13.5–18)
HEMOGLOBIN: 12.8 GM/DL (ref 13.5–18)
HEMOGLOBIN: 13.5 GM/DL (ref 13.5–18)
HEMOGLOBIN: 14.2 GM/DL (ref 13.5–18)
HEMOGLOBIN: 15.2 GM/DL (ref 13.5–18)
HEMOGLOBIN: 15.7 GM/DL (ref 13.5–18)
HEMOGLOBIN: 15.8 GM/DL (ref 13.5–18)
HEMOGLOBIN: 8.5 GM/DL (ref 13.5–18)
HEMOGLOBIN: 8.9 GM/DL (ref 13.5–18)
HEMOGLOBIN: 8.9 GM/DL (ref 13.5–18)
HEMOGLOBIN: 9 GM/DL (ref 13.5–18)
HEMOGLOBIN: 9.2 GM/DL (ref 13.5–18)
HIGH SENSITIVE C-REACTIVE PROTEIN: 110 MG/L
HIGH SENSITIVE C-REACTIVE PROTEIN: 121.5 MG/L
HIGH SENSITIVE C-REACTIVE PROTEIN: 136.4 MG/L
HIGH SENSITIVE C-REACTIVE PROTEIN: 153.3 MG/L
HIGH SENSITIVE C-REACTIVE PROTEIN: 153.4 MG/L
HIGH SENSITIVE C-REACTIVE PROTEIN: 167.8 MG/L
HIGH SENSITIVE C-REACTIVE PROTEIN: 224.3 MG/L
HIGH SENSITIVE C-REACTIVE PROTEIN: 240.6 MG/L
HIGH SENSITIVE C-REACTIVE PROTEIN: 86.6 MG/L
HIGH SENSITIVE C-REACTIVE PROTEIN: >300 MG/L
HYPERSEGMENTED NEUTROPHILS: PRESENT
HYPERSEGMENTED NEUTROPHILS: PRESENT
HYPOCHROMIA: ABNORMAL
HYPOCHROMIA: ABNORMAL
IMMATURE NEUTROPHIL %: 0.4 % (ref 0–0.43)
IMMATURE NEUTROPHIL %: 1.3 % (ref 0–0.43)
IMMATURE NEUTROPHIL %: 2.2 % (ref 0–0.43)
IMMATURE NEUTROPHIL %: 9.8 % (ref 0–0.43)
INR BLD: 0.94 INDEX
INR BLD: 0.95 INDEX
INR BLD: 0.95 INDEX
INR BLD: 0.97 INDEX
INR BLD: 0.97 INDEX
INR BLD: 0.98 INDEX
INR BLD: 1.01 INDEX
INR BLD: 1.01 INDEX
INR BLD: 1.1 INDEX
INR BLD: 1.1 INDEX
INR BLD: 1.12 INDEX
INR BLD: 1.2 INDEX
INR BLD: 1.2 INDEX
INR BLD: 1.25 INDEX
INR BLD: 1.38 INDEX
IONIZED CA: 0.87 MMOL/L (ref 1.12–1.32)
IONIZED CA: 0.92 MMOL/L (ref 1.12–1.32)
IONIZED CA: 0.93 MMOL/L (ref 1.12–1.32)
IONIZED CA: 0.95 MMOL/L (ref 1.12–1.32)
IONIZED CA: 0.95 MMOL/L (ref 1.12–1.32)
IONIZED CA: 0.96 MMOL/L (ref 1.12–1.32)
IONIZED CA: 0.97 MMOL/L (ref 1.12–1.32)
IONIZED CA: 0.98 MMOL/L (ref 1.12–1.32)
IONIZED CA: 0.99 MMOL/L (ref 1.12–1.32)
IONIZED CA: 0.99 MMOL/L (ref 1.12–1.32)
IONIZED CA: 1 MMOL/L (ref 1.12–1.32)
IONIZED CA: 1 MMOL/L (ref 1.12–1.32)
IONIZED CA: 1.01 MMOL/L (ref 1.12–1.32)
IONIZED CA: 1.01 MMOL/L (ref 1.12–1.32)
IONIZED CA: 1.02 MMOL/L (ref 1.12–1.32)
IONIZED CA: 1.03 MMOL/L (ref 1.12–1.32)
IONIZED CA: 1.04 MMOL/L (ref 1.12–1.32)
IONIZED CA: 1.05 MMOL/L (ref 1.12–1.32)
IONIZED CA: 1.06 MMOL/L (ref 1.12–1.32)
IONIZED CA: 1.06 MMOL/L (ref 1.12–1.32)
IONIZED CA: 1.09 MMOL/L (ref 1.12–1.32)
KETONES, URINE: ABNORMAL MG/DL
KETONES, URINE: ABNORMAL MG/DL
LACTATE DEHYDROGENASE: 521 IU/L (ref 120–246)
LACTATE: 1.9 MMOL/L (ref 0.4–2)
LACTIC ACID, SEPSIS: 1.9 MMOL/L (ref 0.5–1.9)
LEGIONELLA URINARY AG: NEGATIVE
LEUKOCYTE ESTERASE, URINE: NEGATIVE
LEUKOCYTE ESTERASE, URINE: NEGATIVE
LV EF: 55 %
LVEF MODALITY: NORMAL
LYMPHOCYTES ABSOLUTE: 0.2 K/CU MM
LYMPHOCYTES ABSOLUTE: 0.3 K/CU MM
LYMPHOCYTES ABSOLUTE: 0.4 K/CU MM
LYMPHOCYTES ABSOLUTE: 0.4 K/CU MM
LYMPHOCYTES ABSOLUTE: 0.5 K/CU MM
LYMPHOCYTES ABSOLUTE: 0.6 K/CU MM
LYMPHOCYTES ABSOLUTE: 0.9 K/CU MM
LYMPHOCYTES RELATIVE PERCENT: 1 % (ref 24–44)
LYMPHOCYTES RELATIVE PERCENT: 1.5 % (ref 24–44)
LYMPHOCYTES RELATIVE PERCENT: 2 % (ref 24–44)
LYMPHOCYTES RELATIVE PERCENT: 2.2 % (ref 24–44)
LYMPHOCYTES RELATIVE PERCENT: 3 % (ref 24–44)
LYMPHOCYTES RELATIVE PERCENT: 3 % (ref 24–44)
LYMPHOCYTES RELATIVE PERCENT: 3.4 % (ref 24–44)
LYMPHOCYTES RELATIVE PERCENT: 4 % (ref 24–44)
LYMPHOCYTES RELATIVE PERCENT: 5 % (ref 24–44)
LYMPHOCYTES RELATIVE PERCENT: 5.1 % (ref 24–44)
Lab: 2
Lab: ABNORMAL
Lab: NORMAL
MACROCYTES: ABNORMAL
MAGNESIUM: 1.9 MG/DL (ref 1.8–2.4)
MAGNESIUM: 2 MG/DL (ref 1.8–2.4)
MAGNESIUM: 2.2 MG/DL (ref 1.8–2.4)
MAGNESIUM: 2.3 MG/DL (ref 1.8–2.4)
MAGNESIUM: 2.3 MG/DL (ref 1.8–2.4)
MAGNESIUM: 2.4 MG/DL (ref 1.8–2.4)
MAGNESIUM: 2.4 MG/DL (ref 1.8–2.4)
MAGNESIUM: 2.5 MG/DL (ref 1.8–2.4)
MAGNESIUM: 2.6 MG/DL (ref 1.8–2.4)
MAGNESIUM: 2.7 MG/DL (ref 1.8–2.4)
MAGNESIUM: 2.9 MG/DL (ref 1.8–2.4)
MAGNESIUM: 3.2 MG/DL (ref 1.8–2.4)
MCH RBC QN AUTO: 30.5 PG (ref 27–31)
MCH RBC QN AUTO: 30.7 PG (ref 27–31)
MCH RBC QN AUTO: 30.8 PG (ref 27–31)
MCH RBC QN AUTO: 30.8 PG (ref 27–31)
MCH RBC QN AUTO: 31 PG (ref 27–31)
MCH RBC QN AUTO: 31.3 PG (ref 27–31)
MCH RBC QN AUTO: 31.3 PG (ref 27–31)
MCH RBC QN AUTO: 31.4 PG (ref 27–31)
MCH RBC QN AUTO: 31.5 PG (ref 27–31)
MCH RBC QN AUTO: 31.9 PG (ref 27–31)
MCH RBC QN AUTO: 32.2 PG (ref 27–31)
MCH RBC QN AUTO: 32.4 PG (ref 27–31)
MCH RBC QN AUTO: 32.6 PG (ref 27–31)
MCHC RBC AUTO-ENTMCNC: 31.3 % (ref 32–36)
MCHC RBC AUTO-ENTMCNC: 31.4 % (ref 32–36)
MCHC RBC AUTO-ENTMCNC: 32.1 % (ref 32–36)
MCHC RBC AUTO-ENTMCNC: 32.2 % (ref 32–36)
MCHC RBC AUTO-ENTMCNC: 32.3 % (ref 32–36)
MCHC RBC AUTO-ENTMCNC: 32.4 % (ref 32–36)
MCHC RBC AUTO-ENTMCNC: 32.8 % (ref 32–36)
MCHC RBC AUTO-ENTMCNC: 32.9 % (ref 32–36)
MCHC RBC AUTO-ENTMCNC: 33 % (ref 32–36)
MCHC RBC AUTO-ENTMCNC: 33.2 % (ref 32–36)
MCHC RBC AUTO-ENTMCNC: 33.5 % (ref 32–36)
MCHC RBC AUTO-ENTMCNC: 33.7 % (ref 32–36)
MCHC RBC AUTO-ENTMCNC: 33.9 % (ref 32–36)
MCHC RBC AUTO-ENTMCNC: 34.1 % (ref 32–36)
MCHC RBC AUTO-ENTMCNC: 34.1 % (ref 32–36)
MCV RBC AUTO: 100 FL (ref 78–100)
MCV RBC AUTO: 90.4 FL (ref 78–100)
MCV RBC AUTO: 90.7 FL (ref 78–100)
MCV RBC AUTO: 92 FL (ref 78–100)
MCV RBC AUTO: 92.6 FL (ref 78–100)
MCV RBC AUTO: 93 FL (ref 78–100)
MCV RBC AUTO: 94.4 FL (ref 78–100)
MCV RBC AUTO: 95.1 FL (ref 78–100)
MCV RBC AUTO: 95.5 FL (ref 78–100)
MCV RBC AUTO: 96.6 FL (ref 78–100)
MCV RBC AUTO: 97.2 FL (ref 78–100)
MCV RBC AUTO: 97.2 FL (ref 78–100)
MCV RBC AUTO: 97.3 FL (ref 78–100)
MCV RBC AUTO: 97.4 FL (ref 78–100)
MCV RBC AUTO: 97.6 FL (ref 78–100)
MCV RBC AUTO: 98.2 FL (ref 78–100)
MCV RBC AUTO: 99.2 FL (ref 78–100)
METAMYELOCYTES ABSOLUTE COUNT: 0.16 K/CU MM
METAMYELOCYTES ABSOLUTE COUNT: 0.17 K/CU MM
METAMYELOCYTES ABSOLUTE COUNT: 0.22 K/CU MM
METAMYELOCYTES ABSOLUTE COUNT: 0.26 K/CU MM
METAMYELOCYTES ABSOLUTE COUNT: 0.34 K/CU MM
METAMYELOCYTES ABSOLUTE COUNT: 0.36 K/CU MM
METAMYELOCYTES ABSOLUTE COUNT: 0.37 K/CU MM
METAMYELOCYTES ABSOLUTE COUNT: 0.46 K/CU MM
METAMYELOCYTES ABSOLUTE COUNT: 0.76 K/CU MM
METAMYELOCYTES ABSOLUTE COUNT: 0.9 K/CU MM
METAMYELOCYTES ABSOLUTE COUNT: 0.94 K/CU MM
METAMYELOCYTES PERCENT: 1 %
METAMYELOCYTES PERCENT: 2 %
METAMYELOCYTES PERCENT: 2 %
METAMYELOCYTES PERCENT: 4 %
METAMYELOCYTES PERCENT: 5 %
METAMYELOCYTES PERCENT: 5 %
METHEMOGLOBIN ARTERIAL: 0.8 %
METHEMOGLOBIN ARTERIAL: 1.1 %
METHEMOGLOBIN ARTERIAL: 1.3 %
METHEMOGLOBIN ARTERIAL: 1.4 %
METHEMOGLOBIN ARTERIAL: 1.5 %
METHEMOGLOBIN ARTERIAL: 1.6 %
METHEMOGLOBIN ARTERIAL: 1.8 %
MONOCYTES ABSOLUTE: 0.2 K/CU MM
MONOCYTES ABSOLUTE: 0.3 K/CU MM
MONOCYTES ABSOLUTE: 0.3 K/CU MM
MONOCYTES ABSOLUTE: 0.4 K/CU MM
MONOCYTES ABSOLUTE: 0.6 K/CU MM
MONOCYTES ABSOLUTE: 0.6 K/CU MM
MONOCYTES ABSOLUTE: 0.7 K/CU MM
MONOCYTES ABSOLUTE: 0.7 K/CU MM
MONOCYTES ABSOLUTE: 0.9 K/CU MM
MONOCYTES ABSOLUTE: 1 K/CU MM
MONOCYTES ABSOLUTE: 1.1 K/CU MM
MONOCYTES ABSOLUTE: 1.3 K/CU MM
MONOCYTES ABSOLUTE: 1.3 K/CU MM
MONOCYTES RELATIVE PERCENT: 1 % (ref 0–4)
MONOCYTES RELATIVE PERCENT: 10.3 % (ref 0–4)
MONOCYTES RELATIVE PERCENT: 2 % (ref 0–4)
MONOCYTES RELATIVE PERCENT: 2.1 % (ref 0–4)
MONOCYTES RELATIVE PERCENT: 2.3 % (ref 0–4)
MONOCYTES RELATIVE PERCENT: 3 % (ref 0–4)
MONOCYTES RELATIVE PERCENT: 3 % (ref 0–4)
MONOCYTES RELATIVE PERCENT: 4 % (ref 0–4)
MONOCYTES RELATIVE PERCENT: 4 % (ref 0–4)
MONOCYTES RELATIVE PERCENT: 5 % (ref 0–4)
MONOCYTES RELATIVE PERCENT: 5 % (ref 0–4)
MONOCYTES RELATIVE PERCENT: 6.4 % (ref 0–4)
MUCUS: ABNORMAL HPF
MYELOCYTE PERCENT: 1 %
MYELOCYTE PERCENT: 3 %
MYELOCYTE PERCENT: 5 %
MYELOCYTES ABSOLUTE COUNT: 0.16 K/CU MM
MYELOCYTES ABSOLUTE COUNT: 0.18 K/CU MM
MYELOCYTES ABSOLUTE COUNT: 0.19 K/CU MM
MYELOCYTES ABSOLUTE COUNT: 0.23 K/CU MM
MYELOCYTES ABSOLUTE COUNT: 0.23 K/CU MM
MYELOCYTES ABSOLUTE COUNT: 0.26 K/CU MM
MYELOCYTES ABSOLUTE COUNT: 0.34 K/CU MM
MYELOCYTES ABSOLUTE COUNT: 0.36 K/CU MM
MYELOCYTES ABSOLUTE COUNT: 0.57 K/CU MM
MYELOCYTES ABSOLUTE COUNT: 0.84 K/CU MM
NITRITE URINE, QUANTITATIVE: NEGATIVE
NITRITE URINE, QUANTITATIVE: NEGATIVE
NUCLEATED RBC %: 0 %
NUCLEATED RBC %: 0.3 %
NUCLEATED RBC %: 0.5 %
NUCLEATED RED BLOOD CELLS: 1
O2 SAT, VEN: 53.6 % (ref 50–70)
O2 SATURATION: 66.9 % (ref 96–97)
O2 SATURATION: 87.4 % (ref 96–97)
O2 SATURATION: 88.3 % (ref 96–97)
O2 SATURATION: 88.4 % (ref 96–97)
O2 SATURATION: 89.6 % (ref 96–97)
O2 SATURATION: 91.7 % (ref 96–97)
O2 SATURATION: 92.9 % (ref 96–97)
O2 SATURATION: 93.8 % (ref 96–97)
O2 SATURATION: 94.3 % (ref 96–97)
O2 SATURATION: 94.4 % (ref 96–97)
O2 SATURATION: 94.9 % (ref 96–97)
O2 SATURATION: 95.7 % (ref 96–97)
O2 SATURATION: 95.8 % (ref 96–97)
O2 SATURATION: 95.9 % (ref 96–97)
O2 SATURATION: 96 % (ref 96–97)
O2 SATURATION: 96.4 % (ref 96–97)
O2 SATURATION: 96.4 % (ref 96–97)
O2 SATURATION: 96.7 % (ref 96–97)
O2 SATURATION: 96.7 % (ref 96–97)
O2 SATURATION: 97 % (ref 96–97)
OCCULT BLOOD, OTHER: POSITIVE
PCO2 ARTERIAL: 35 MMHG (ref 32–45)
PCO2 ARTERIAL: 35 MMHG (ref 32–45)
PCO2 ARTERIAL: 36 MMHG (ref 32–45)
PCO2 ARTERIAL: 38 MMHG (ref 32–45)
PCO2 ARTERIAL: 39 MMHG (ref 32–45)
PCO2 ARTERIAL: 39 MMHG (ref 32–45)
PCO2 ARTERIAL: 42 MMHG (ref 32–45)
PCO2 ARTERIAL: 42 MMHG (ref 32–45)
PCO2 ARTERIAL: 43 MMHG (ref 32–45)
PCO2 ARTERIAL: 44 MMHG (ref 32–45)
PCO2 ARTERIAL: 45 MMHG (ref 32–45)
PCO2 ARTERIAL: 47 MMHG (ref 32–45)
PCO2 ARTERIAL: 53 MMHG (ref 32–45)
PCO2 ARTERIAL: 54 MMHG (ref 32–45)
PCO2 ARTERIAL: 54 MMHG (ref 32–45)
PCO2 ARTERIAL: 57 MMHG (ref 32–45)
PCO2 ARTERIAL: 59 MMHG (ref 32–45)
PCO2 ARTERIAL: 63 MMHG (ref 32–45)
PCO2 ARTERIAL: 65 MMHG (ref 32–45)
PCO2 ARTERIAL: 69 MMHG (ref 32–45)
PCO2, VEN: 43.8 MMHG (ref 38–52)
PDW BLD-RTO: 11.9 % (ref 11.7–14.9)
PDW BLD-RTO: 12.4 % (ref 11.7–14.9)
PDW BLD-RTO: 13 % (ref 11.7–14.9)
PDW BLD-RTO: 13.5 % (ref 11.7–14.9)
PDW BLD-RTO: 13.7 % (ref 11.7–14.9)
PDW BLD-RTO: 13.8 % (ref 11.7–14.9)
PDW BLD-RTO: 13.9 % (ref 11.7–14.9)
PDW BLD-RTO: 13.9 % (ref 11.7–14.9)
PDW BLD-RTO: 14.1 % (ref 11.7–14.9)
PDW BLD-RTO: 14.2 % (ref 11.7–14.9)
PDW BLD-RTO: 14.2 % (ref 11.7–14.9)
PDW BLD-RTO: 14.4 % (ref 11.7–14.9)
PDW BLD-RTO: 14.5 % (ref 11.7–14.9)
PDW BLD-RTO: 14.6 % (ref 11.7–14.9)
PDW BLD-RTO: 14.6 % (ref 11.7–14.9)
PH BLOOD: 7.06 (ref 7.34–7.45)
PH BLOOD: 7.13 (ref 7.34–7.45)
PH BLOOD: 7.16 (ref 7.34–7.45)
PH BLOOD: 7.19 (ref 7.34–7.45)
PH BLOOD: 7.2 (ref 7.34–7.45)
PH BLOOD: 7.23 (ref 7.34–7.45)
PH BLOOD: 7.25 (ref 7.34–7.45)
PH BLOOD: 7.26 (ref 7.34–7.45)
PH BLOOD: 7.26 (ref 7.34–7.45)
PH BLOOD: 7.28 (ref 7.34–7.45)
PH BLOOD: 7.31 (ref 7.34–7.45)
PH BLOOD: 7.31 (ref 7.34–7.45)
PH BLOOD: 7.32 (ref 7.34–7.45)
PH BLOOD: 7.33 (ref 7.34–7.45)
PH BLOOD: 7.33 (ref 7.34–7.45)
PH BLOOD: 7.35 (ref 7.34–7.45)
PH BLOOD: 7.36 (ref 7.34–7.45)
PH BLOOD: 7.4 (ref 7.34–7.45)
PH BLOOD: 7.4 (ref 7.34–7.45)
PH BLOOD: 7.43 (ref 7.34–7.45)
PH VENOUS: 7.39 (ref 7.32–7.42)
PH, URINE: 5 (ref 5–8)
PH, URINE: 6 (ref 5–8)
PHOSPHORUS: 10.4 MG/DL (ref 2.5–4.9)
PHOSPHORUS: 2.5 MG/DL (ref 2.5–4.9)
PHOSPHORUS: 2.5 MG/DL (ref 2.5–4.9)
PHOSPHORUS: 2.8 MG/DL (ref 2.5–4.9)
PHOSPHORUS: 3 MG/DL (ref 2.5–4.9)
PHOSPHORUS: 3.1 MG/DL (ref 2.5–4.9)
PHOSPHORUS: 3.4 MG/DL (ref 2.5–4.9)
PHOSPHORUS: 3.4 MG/DL (ref 2.5–4.9)
PHOSPHORUS: 3.7 MG/DL (ref 2.5–4.9)
PHOSPHORUS: 3.9 MG/DL (ref 2.5–4.9)
PHOSPHORUS: 4.1 MG/DL (ref 2.5–4.9)
PHOSPHORUS: 4.3 MG/DL (ref 2.5–4.9)
PHOSPHORUS: 4.3 MG/DL (ref 2.5–4.9)
PHOSPHORUS: 4.4 MG/DL (ref 2.5–4.9)
PHOSPHORUS: 4.9 MG/DL (ref 2.5–4.9)
PHOSPHORUS: 6.1 MG/DL (ref 2.5–4.9)
PHOSPHORUS: 7 MG/DL (ref 2.5–4.9)
PHOSPHORUS: 7.4 MG/DL (ref 2.5–4.9)
PHOSPHORUS: 8.4 MG/DL (ref 2.5–4.9)
PHOSPHORUS: 8.6 MG/DL (ref 2.5–4.9)
PLATELET # BLD: 104 K/CU MM (ref 140–440)
PLATELET # BLD: 111 K/CU MM (ref 140–440)
PLATELET # BLD: 126 K/CU MM (ref 140–440)
PLATELET # BLD: 139 K/CU MM (ref 140–440)
PLATELET # BLD: 169 K/CU MM (ref 140–440)
PLATELET # BLD: 181 K/CU MM (ref 140–440)
PLATELET # BLD: 181 K/CU MM (ref 140–440)
PLATELET # BLD: 184 K/CU MM (ref 140–440)
PLATELET # BLD: 197 K/CU MM (ref 140–440)
PLATELET # BLD: 210 K/CU MM (ref 140–440)
PLATELET # BLD: 214 K/CU MM (ref 140–440)
PLATELET # BLD: 226 K/CU MM (ref 140–440)
PLATELET # BLD: 246 K/CU MM (ref 140–440)
PLATELET # BLD: 264 K/CU MM (ref 140–440)
PLATELET # BLD: 61 K/CU MM (ref 140–440)
PLATELET # BLD: 62 K/CU MM (ref 140–440)
PLATELET # BLD: 88 K/CU MM (ref 140–440)
PLT MORPHOLOGY: ABNORMAL
PMV BLD AUTO: 10.1 FL (ref 7.5–11.1)
PMV BLD AUTO: 10.3 FL (ref 7.5–11.1)
PMV BLD AUTO: 11.1 FL (ref 7.5–11.1)
PMV BLD AUTO: 11.7 FL (ref 7.5–11.1)
PMV BLD AUTO: 11.7 FL (ref 7.5–11.1)
PMV BLD AUTO: 11.8 FL (ref 7.5–11.1)
PMV BLD AUTO: 12.2 FL (ref 7.5–11.1)
PMV BLD AUTO: 12.7 FL (ref 7.5–11.1)
PMV BLD AUTO: 12.9 FL (ref 7.5–11.1)
PMV BLD AUTO: 8.7 FL (ref 7.5–11.1)
PMV BLD AUTO: 8.7 FL (ref 7.5–11.1)
PMV BLD AUTO: 8.9 FL (ref 7.5–11.1)
PMV BLD AUTO: 9.1 FL (ref 7.5–11.1)
PMV BLD AUTO: 9.2 FL (ref 7.5–11.1)
PMV BLD AUTO: 9.6 FL (ref 7.5–11.1)
PO2 ARTERIAL: 104 MMHG (ref 75–100)
PO2 ARTERIAL: 109 MMHG (ref 75–100)
PO2 ARTERIAL: 120 MMHG (ref 75–100)
PO2 ARTERIAL: 124 MMHG (ref 75–100)
PO2 ARTERIAL: 125 MMHG (ref 75–100)
PO2 ARTERIAL: 126 MMHG (ref 75–100)
PO2 ARTERIAL: 142 MMHG (ref 75–100)
PO2 ARTERIAL: 157 MMHG (ref 75–100)
PO2 ARTERIAL: 53 MMHG (ref 75–100)
PO2 ARTERIAL: 56 MMHG (ref 75–100)
PO2 ARTERIAL: 56 MMHG (ref 75–100)
PO2 ARTERIAL: 57 MMHG (ref 75–100)
PO2 ARTERIAL: 60 MMHG (ref 75–100)
PO2 ARTERIAL: 68 MMHG (ref 75–100)
PO2 ARTERIAL: 71 MMHG (ref 75–100)
PO2 ARTERIAL: 74 MMHG (ref 75–100)
PO2 ARTERIAL: 80 MMHG (ref 75–100)
PO2 ARTERIAL: 84 MMHG (ref 75–100)
PO2 ARTERIAL: 85 MMHG (ref 75–100)
PO2 ARTERIAL: 85 MMHG (ref 75–100)
PO2, VEN: 28.8 MMHG (ref 28–48)
POLYCHROMASIA: ABNORMAL
POTASSIUM SERPL-SCNC: 3.2 MMOL/L (ref 3.5–5.1)
POTASSIUM SERPL-SCNC: 3.2 MMOL/L (ref 3.5–5.1)
POTASSIUM SERPL-SCNC: 3.4 MMOL/L (ref 3.5–5.1)
POTASSIUM SERPL-SCNC: 3.4 MMOL/L (ref 3.5–5.1)
POTASSIUM SERPL-SCNC: 3.6 MMOL/L (ref 3.5–5.1)
POTASSIUM SERPL-SCNC: 3.7 MMOL/L (ref 3.5–5.1)
POTASSIUM SERPL-SCNC: 3.8 MMOL/L (ref 3.5–5.1)
POTASSIUM SERPL-SCNC: 3.9 MMOL/L (ref 3.5–5.1)
POTASSIUM SERPL-SCNC: 3.9 MMOL/L (ref 3.5–5.1)
POTASSIUM SERPL-SCNC: 4 MMOL/L (ref 3.5–5.1)
POTASSIUM SERPL-SCNC: 4.2 MMOL/L (ref 3.5–5.1)
POTASSIUM SERPL-SCNC: 4.3 MMOL/L (ref 3.5–5.1)
POTASSIUM SERPL-SCNC: 4.4 MMOL/L (ref 3.5–5.1)
POTASSIUM SERPL-SCNC: 4.5 MMOL/L (ref 3.5–5.1)
POTASSIUM SERPL-SCNC: 4.6 MMOL/L (ref 3.5–5.1)
POTASSIUM SERPL-SCNC: 4.6 MMOL/L (ref 3.5–5.1)
POTASSIUM SERPL-SCNC: 4.7 MMOL/L (ref 3.5–5.1)
POTASSIUM SERPL-SCNC: 4.8 MMOL/L (ref 3.5–5.1)
POTASSIUM SERPL-SCNC: 4.9 MMOL/L (ref 3.5–5.1)
POTASSIUM SERPL-SCNC: 4.9 MMOL/L (ref 3.5–5.1)
POTASSIUM SERPL-SCNC: 5.9 MMOL/L (ref 3.5–5.1)
PREALBUMIN: 6 MG/DL (ref 20–40)
PRO-BNP: 1351 PG/ML
PRO-BNP: 386.3 PG/ML
PROCALCITONIN: 0.91
PROCALCITONIN: 3.15
PROMYELOCYTES ABSOLUTE COUNT: 0.23 K/CU MM
PROMYELOCYTES ABSOLUTE COUNT: 0.34 K/CU MM
PROMYELOCYTES ABSOLUTE COUNT: 1.72 K/CU MM
PROMYELOCYTES PERCENT: 1 %
PROMYELOCYTES PERCENT: 2 %
PROMYELOCYTES PERCENT: 9 %
PROT/CREAT RATIO, UR: 0.9
PROTEIN UA: 100 MG/DL
PROTEIN UA: ABNORMAL MG/DL
PROTHROMBIN TIME: 11.4 SECONDS (ref 11.7–14.5)
PROTHROMBIN TIME: 11.5 SECONDS (ref 11.7–14.5)
PROTHROMBIN TIME: 11.5 SECONDS (ref 11.7–14.5)
PROTHROMBIN TIME: 11.7 SECONDS (ref 11.7–14.5)
PROTHROMBIN TIME: 11.7 SECONDS (ref 11.7–14.5)
PROTHROMBIN TIME: 11.8 SECONDS (ref 11.7–14.5)
PROTHROMBIN TIME: 12.2 SECONDS (ref 11.7–14.5)
PROTHROMBIN TIME: 12.2 SECONDS (ref 11.7–14.5)
PROTHROMBIN TIME: 13.3 SECONDS (ref 11.7–14.5)
PROTHROMBIN TIME: 13.3 SECONDS (ref 11.7–14.5)
PROTHROMBIN TIME: 13.6 SECONDS (ref 11.7–14.5)
PROTHROMBIN TIME: 14.5 SECONDS (ref 11.7–14.5)
PROTHROMBIN TIME: 14.6 SECONDS (ref 11.7–14.5)
PROTHROMBIN TIME: 15.2 SECONDS (ref 11.7–14.5)
PROTHROMBIN TIME: 16.8 SECONDS (ref 11.7–14.5)
RAPID INFLUENZA  B AGN: NEGATIVE
RAPID INFLUENZA A AGN: NEGATIVE
RBC # BLD: 2.78 M/CU MM (ref 4.6–6.2)
RBC # BLD: 2.84 M/CU MM (ref 4.6–6.2)
RBC # BLD: 2.84 M/CU MM (ref 4.6–6.2)
RBC # BLD: 2.97 M/CU MM (ref 4.6–6.2)
RBC # BLD: 3.11 M/CU MM (ref 4.6–6.2)
RBC # BLD: 3.25 M/CU MM (ref 4.6–6.2)
RBC # BLD: 3.59 M/CU MM (ref 4.6–6.2)
RBC # BLD: 3.68 M/CU MM (ref 4.6–6.2)
RBC # BLD: 3.76 M/CU MM (ref 4.6–6.2)
RBC # BLD: 3.76 M/CU MM (ref 4.6–6.2)
RBC # BLD: 3.98 M/CU MM (ref 4.6–6.2)
RBC # BLD: 4.2 M/CU MM (ref 4.6–6.2)
RBC # BLD: 4.43 M/CU MM (ref 4.6–6.2)
RBC # BLD: 4.62 M/CU MM (ref 4.6–6.2)
RBC # BLD: 4.98 M/CU MM (ref 4.6–6.2)
RBC # BLD: 5.03 M/CU MM (ref 4.6–6.2)
RBC # BLD: 5.09 M/CU MM (ref 4.6–6.2)
RBC # BLD: ABNORMAL 10*6/UL
RBC URINE: ABNORMAL /HPF (ref 0–3)
RBC URINE: ABNORMAL /HPF (ref 0–3)
REASON FOR REJECTION: NORMAL
REASON FOR REJECTION: NORMAL
REJECTED TEST: NORMAL
REJECTED TEST: NORMAL
SARS-COV-2, NAAT: DETECTED
SEGMENTED NEUTROPHILS ABSOLUTE COUNT: 12.1 K/CU MM
SEGMENTED NEUTROPHILS ABSOLUTE COUNT: 12.8 K/CU MM
SEGMENTED NEUTROPHILS ABSOLUTE COUNT: 13 K/CU MM
SEGMENTED NEUTROPHILS ABSOLUTE COUNT: 13.2 K/CU MM
SEGMENTED NEUTROPHILS ABSOLUTE COUNT: 14.3 K/CU MM
SEGMENTED NEUTROPHILS ABSOLUTE COUNT: 14.5 K/CU MM
SEGMENTED NEUTROPHILS ABSOLUTE COUNT: 15.5 K/CU MM
SEGMENTED NEUTROPHILS ABSOLUTE COUNT: 17.8 K/CU MM
SEGMENTED NEUTROPHILS ABSOLUTE COUNT: 17.9 K/CU MM
SEGMENTED NEUTROPHILS ABSOLUTE COUNT: 18.7 K/CU MM
SEGMENTED NEUTROPHILS ABSOLUTE COUNT: 19.1 K/CU MM
SEGMENTED NEUTROPHILS ABSOLUTE COUNT: 20.6 K/CU MM
SEGMENTED NEUTROPHILS ABSOLUTE COUNT: 31.8 K/CU MM
SEGMENTED NEUTROPHILS ABSOLUTE COUNT: 32.9 K/CU MM
SEGMENTED NEUTROPHILS ABSOLUTE COUNT: 8 K/CU MM
SEGMENTED NEUTROPHILS RELATIVE PERCENT: 67 % (ref 36–66)
SEGMENTED NEUTROPHILS RELATIVE PERCENT: 76 % (ref 36–66)
SEGMENTED NEUTROPHILS RELATIVE PERCENT: 77 % (ref 36–66)
SEGMENTED NEUTROPHILS RELATIVE PERCENT: 79 % (ref 36–66)
SEGMENTED NEUTROPHILS RELATIVE PERCENT: 79 % (ref 36–66)
SEGMENTED NEUTROPHILS RELATIVE PERCENT: 80 % (ref 36–66)
SEGMENTED NEUTROPHILS RELATIVE PERCENT: 81 % (ref 36–66)
SEGMENTED NEUTROPHILS RELATIVE PERCENT: 83 % (ref 36–66)
SEGMENTED NEUTROPHILS RELATIVE PERCENT: 83.7 % (ref 36–66)
SEGMENTED NEUTROPHILS RELATIVE PERCENT: 83.9 % (ref 36–66)
SEGMENTED NEUTROPHILS RELATIVE PERCENT: 84 % (ref 36–66)
SEGMENTED NEUTROPHILS RELATIVE PERCENT: 84 % (ref 36–66)
SEGMENTED NEUTROPHILS RELATIVE PERCENT: 86 % (ref 36–66)
SEGMENTED NEUTROPHILS RELATIVE PERCENT: 88.9 % (ref 36–66)
SEGMENTED NEUTROPHILS RELATIVE PERCENT: 91 % (ref 36–66)
SEGMENTED NEUTROPHILS RELATIVE PERCENT: 94 % (ref 36–66)
SEGMENTED NEUTROPHILS RELATIVE PERCENT: 94.6 % (ref 36–66)
SMUDGE CELLS: PRESENT
SMUDGE CELLS: PRESENT
SODIUM BLD-SCNC: 128 MMOL/L (ref 135–145)
SODIUM BLD-SCNC: 128 MMOL/L (ref 135–145)
SODIUM BLD-SCNC: 129 MMOL/L (ref 135–145)
SODIUM BLD-SCNC: 129 MMOL/L (ref 135–145)
SODIUM BLD-SCNC: 130 MMOL/L (ref 135–145)
SODIUM BLD-SCNC: 131 MMOL/L (ref 135–145)
SODIUM BLD-SCNC: 132 MMOL/L (ref 135–145)
SODIUM BLD-SCNC: 132 MMOL/L (ref 135–145)
SODIUM BLD-SCNC: 133 MMOL/L (ref 135–145)
SODIUM BLD-SCNC: 134 MMOL/L (ref 135–145)
SODIUM BLD-SCNC: 135 MMOL/L (ref 135–145)
SODIUM BLD-SCNC: 135 MMOL/L (ref 135–145)
SODIUM BLD-SCNC: 136 MMOL/L (ref 135–145)
SODIUM BLD-SCNC: 137 MMOL/L (ref 135–145)
SODIUM BLD-SCNC: 138 MMOL/L (ref 135–145)
SODIUM BLD-SCNC: 139 MMOL/L (ref 135–145)
SODIUM BLD-SCNC: 139 MMOL/L (ref 135–145)
SODIUM BLD-SCNC: 140 MMOL/L (ref 135–145)
SODIUM BLD-SCNC: 141 MMOL/L (ref 135–145)
SOURCE, BLOOD GAS: ABNORMAL
SOURCE: ABNORMAL
SPECIFIC GRAVITY UA: 1.02 (ref 1–1.03)
SPECIFIC GRAVITY UA: 1.02 (ref 1–1.03)
SPECIMEN: ABNORMAL
SPECIMEN: NORMAL
STATUS: NORMAL
STATUS: NORMAL
STREP PNEUMONIAE ANTIGEN: NORMAL
TEST NAME: NORMAL
TOTAL IMMATURE NEUTOROPHIL: 0.04 K/CU MM
TOTAL IMMATURE NEUTOROPHIL: 0.21 K/CU MM
TOTAL IMMATURE NEUTOROPHIL: 0.44 K/CU MM
TOTAL IMMATURE NEUTOROPHIL: 1.8 K/CU MM
TOTAL NUCLEATED RBC: 0 K/CU MM
TOTAL NUCLEATED RBC: 0.1 K/CU MM
TOTAL NUCLEATED RBC: 0.1 K/CU MM
TOTAL PROTEIN: 4.6 GM/DL (ref 6.4–8.2)
TOTAL PROTEIN: 5 GM/DL (ref 6.4–8.2)
TOTAL PROTEIN: 5.1 GM/DL (ref 6.4–8.2)
TOTAL PROTEIN: 5.1 GM/DL (ref 6.4–8.2)
TOTAL PROTEIN: 5.2 GM/DL (ref 6.4–8.2)
TOTAL PROTEIN: 5.4 GM/DL (ref 6.4–8.2)
TOTAL PROTEIN: 5.5 GM/DL (ref 6.4–8.2)
TOTAL PROTEIN: 5.7 GM/DL (ref 6.4–8.2)
TOTAL PROTEIN: 6 GM/DL (ref 6.4–8.2)
TOTAL PROTEIN: 6.1 GM/DL (ref 6.4–8.2)
TOTAL PROTEIN: 6.1 GM/DL (ref 6.4–8.2)
TOTAL PROTEIN: 6.2 GM/DL (ref 6.4–8.2)
TOTAL PROTEIN: 6.2 GM/DL (ref 6.4–8.2)
TOTAL PROTEIN: 6.3 GM/DL (ref 6.4–8.2)
TOTAL PROTEIN: 6.8 GM/DL (ref 6.4–8.2)
TOXIC GRANULATION: PRESENT
TRANSFUSION STATUS: NORMAL
TRANSFUSION STATUS: NORMAL
TRIGL SERPL-MCNC: 384 MG/DL
TRIGL SERPL-MCNC: 422 MG/DL
TRIGL SERPL-MCNC: 474 MG/DL
TROPONIN T: <0.01 NG/ML
TROPONIN T: <0.01 NG/ML
UNIT DIVISION: 0
UNIT DIVISION: 0
UNIT NUMBER: NORMAL
UNIT NUMBER: NORMAL
URINE TOTAL PROTEIN: 89.4 MG/DL
UROBILINOGEN, URINE: 0.2 MG/DL (ref 0.2–1)
UROBILINOGEN, URINE: 0.2 MG/DL (ref 0.2–1)
VANCOMYCIN RANDOM: 16.3 UG/ML
VANCOMYCIN TROUGH: 4.8 UG/ML (ref 10–20)
VITAMIN D 25-HYDROXY: 21.58 NG/ML
WBC # BLD: 15.2 K/CU MM (ref 4–10.5)
WBC # BLD: 15.7 K/CU MM (ref 4–10.5)
WBC # BLD: 16.4 K/CU MM (ref 4–10.5)
WBC # BLD: 16.8 K/CU MM (ref 4–10.5)
WBC # BLD: 18 K/CU MM (ref 4–10.5)
WBC # BLD: 18.5 K/CU MM (ref 4–10.5)
WBC # BLD: 18.5 K/CU MM (ref 4–10.5)
WBC # BLD: 18.8 K/CU MM (ref 4–10.5)
WBC # BLD: 19.1 K/CU MM (ref 4–10.5)
WBC # BLD: 20.2 K/CU MM (ref 4–10.5)
WBC # BLD: 21.8 K/CU MM (ref 4–10.5)
WBC # BLD: 22.6 K/CU MM (ref 4–10.5)
WBC # BLD: 23 K/CU MM (ref 4–10.5)
WBC # BLD: 26.2 K/CU MM (ref 4–10.5)
WBC # BLD: 33.8 K/CU MM (ref 4–10.5)
WBC # BLD: 36.1 K/CU MM (ref 4–10.5)
WBC # BLD: 9.5 K/CU MM (ref 4–10.5)
WBC # BLD: ABNORMAL 10*3/UL
WBC # BLD: ABNORMAL 10*3/UL
WBC UA: ABNORMAL /HPF (ref 0–2)
WBC UA: NEGATIVE /HPF (ref 0–2)

## 2020-01-01 PROCEDURE — 6360000002 HC RX W HCPCS: Performed by: INTERNAL MEDICINE

## 2020-01-01 PROCEDURE — 2000000000 HC ICU R&B

## 2020-01-01 PROCEDURE — 2580000003 HC RX 258: Performed by: PHYSICIAN ASSISTANT

## 2020-01-01 PROCEDURE — 83735 ASSAY OF MAGNESIUM: CPT

## 2020-01-01 PROCEDURE — P9047 ALBUMIN (HUMAN), 25%, 50ML: HCPCS | Performed by: INTERNAL MEDICINE

## 2020-01-01 PROCEDURE — 85384 FIBRINOGEN ACTIVITY: CPT

## 2020-01-01 PROCEDURE — 2500000003 HC RX 250 WO HCPCS: Performed by: INTERNAL MEDICINE

## 2020-01-01 PROCEDURE — 85730 THROMBOPLASTIN TIME PARTIAL: CPT

## 2020-01-01 PROCEDURE — 2580000003 HC RX 258: Performed by: INTERNAL MEDICINE

## 2020-01-01 PROCEDURE — 96361 HYDRATE IV INFUSION ADD-ON: CPT

## 2020-01-01 PROCEDURE — 85027 COMPLETE CBC AUTOMATED: CPT

## 2020-01-01 PROCEDURE — 90945 DIALYSIS ONE EVALUATION: CPT

## 2020-01-01 PROCEDURE — 99233 SBSQ HOSP IP/OBS HIGH 50: CPT | Performed by: INTERNAL MEDICINE

## 2020-01-01 PROCEDURE — 82803 BLOOD GASES ANY COMBINATION: CPT

## 2020-01-01 PROCEDURE — 94640 AIRWAY INHALATION TREATMENT: CPT

## 2020-01-01 PROCEDURE — 2060000000 HC ICU INTERMEDIATE R&B

## 2020-01-01 PROCEDURE — 71045 X-RAY EXAM CHEST 1 VIEW: CPT

## 2020-01-01 PROCEDURE — 85025 COMPLETE CBC W/AUTO DIFF WBC: CPT

## 2020-01-01 PROCEDURE — 85610 PROTHROMBIN TIME: CPT

## 2020-01-01 PROCEDURE — 83605 ASSAY OF LACTIC ACID: CPT

## 2020-01-01 PROCEDURE — 84478 ASSAY OF TRIGLYCERIDES: CPT

## 2020-01-01 PROCEDURE — C1894 INTRO/SHEATH, NON-LASER: HCPCS

## 2020-01-01 PROCEDURE — 82330 ASSAY OF CALCIUM: CPT

## 2020-01-01 PROCEDURE — 85379 FIBRIN DEGRADATION QUANT: CPT

## 2020-01-01 PROCEDURE — 80053 COMPREHEN METABOLIC PANEL: CPT

## 2020-01-01 PROCEDURE — 6370000000 HC RX 637 (ALT 250 FOR IP): Performed by: INTERNAL MEDICINE

## 2020-01-01 PROCEDURE — 31500 INSERT EMERGENCY AIRWAY: CPT | Performed by: NURSE ANESTHETIST, CERTIFIED REGISTERED

## 2020-01-01 PROCEDURE — 87040 BLOOD CULTURE FOR BACTERIA: CPT

## 2020-01-01 PROCEDURE — 96365 THER/PROPH/DIAG IV INF INIT: CPT

## 2020-01-01 PROCEDURE — 81001 URINALYSIS AUTO W/SCOPE: CPT

## 2020-01-01 PROCEDURE — 2580000003 HC RX 258: Performed by: EMERGENCY MEDICINE

## 2020-01-01 PROCEDURE — 71260 CT THORAX DX C+: CPT

## 2020-01-01 PROCEDURE — C1751 CATH, INF, PER/CENT/MIDLINE: HCPCS

## 2020-01-01 PROCEDURE — 94003 VENT MGMT INPAT SUBQ DAY: CPT

## 2020-01-01 PROCEDURE — 86141 C-REACTIVE PROTEIN HS: CPT

## 2020-01-01 PROCEDURE — 84450 TRANSFERASE (AST) (SGOT): CPT

## 2020-01-01 PROCEDURE — 90945 DIALYSIS ONE EVALUATION: CPT | Performed by: INTERNAL MEDICINE

## 2020-01-01 PROCEDURE — 80048 BASIC METABOLIC PNL TOTAL CA: CPT

## 2020-01-01 PROCEDURE — 85007 BL SMEAR W/DIFF WBC COUNT: CPT

## 2020-01-01 PROCEDURE — 93005 ELECTROCARDIOGRAM TRACING: CPT | Performed by: EMERGENCY MEDICINE

## 2020-01-01 PROCEDURE — 84100 ASSAY OF PHOSPHORUS: CPT

## 2020-01-01 PROCEDURE — 99232 SBSQ HOSP IP/OBS MODERATE 35: CPT | Performed by: INTERNAL MEDICINE

## 2020-01-01 PROCEDURE — C1769 GUIDE WIRE: HCPCS

## 2020-01-01 PROCEDURE — 99223 1ST HOSP IP/OBS HIGH 75: CPT | Performed by: INTERNAL MEDICINE

## 2020-01-01 PROCEDURE — 2700000000 HC OXYGEN THERAPY PER DAY

## 2020-01-01 PROCEDURE — 93010 ELECTROCARDIOGRAM REPORT: CPT | Performed by: INTERNAL MEDICINE

## 2020-01-01 PROCEDURE — 89220 SPUTUM SPECIMEN COLLECTION: CPT

## 2020-01-01 PROCEDURE — 87449 NOS EACH ORGANISM AG IA: CPT

## 2020-01-01 PROCEDURE — 94761 N-INVAS EAR/PLS OXIMETRY MLT: CPT

## 2020-01-01 PROCEDURE — 6360000002 HC RX W HCPCS: Performed by: STUDENT IN AN ORGANIZED HEALTH CARE EDUCATION/TRAINING PROGRAM

## 2020-01-01 PROCEDURE — 2709999900 HC NON-CHARGEABLE SUPPLY

## 2020-01-01 PROCEDURE — 82271 OCCULT BLOOD OTHER SOURCES: CPT

## 2020-01-01 PROCEDURE — 6360000002 HC RX W HCPCS: Performed by: EMERGENCY MEDICINE

## 2020-01-01 PROCEDURE — 2580000003 HC RX 258: Performed by: STUDENT IN AN ORGANIZED HEALTH CARE EDUCATION/TRAINING PROGRAM

## 2020-01-01 PROCEDURE — 6360000002 HC RX W HCPCS: Performed by: NURSE ANESTHETIST, CERTIFIED REGISTERED

## 2020-01-01 PROCEDURE — 76775 US EXAM ABDO BACK WALL LIM: CPT

## 2020-01-01 PROCEDURE — 37799 UNLISTED PX VASCULAR SURGERY: CPT

## 2020-01-01 PROCEDURE — 82962 GLUCOSE BLOOD TEST: CPT

## 2020-01-01 PROCEDURE — 36415 COLL VENOUS BLD VENIPUNCTURE: CPT

## 2020-01-01 PROCEDURE — 2500000003 HC RX 250 WO HCPCS: Performed by: STUDENT IN AN ORGANIZED HEALTH CARE EDUCATION/TRAINING PROGRAM

## 2020-01-01 PROCEDURE — 02HV33Z INSERTION OF INFUSION DEVICE INTO SUPERIOR VENA CAVA, PERCUTANEOUS APPROACH: ICD-10-PCS | Performed by: STUDENT IN AN ORGANIZED HEALTH CARE EDUCATION/TRAINING PROGRAM

## 2020-01-01 PROCEDURE — 84156 ASSAY OF PROTEIN URINE: CPT

## 2020-01-01 PROCEDURE — 94750 HC PULMONARY COMPLIANCE STUDY: CPT

## 2020-01-01 PROCEDURE — 2580000003 HC RX 258

## 2020-01-01 PROCEDURE — 87899 AGENT NOS ASSAY W/OPTIC: CPT

## 2020-01-01 PROCEDURE — 2500000003 HC RX 250 WO HCPCS: Performed by: PHYSICIAN ASSISTANT

## 2020-01-01 PROCEDURE — 99222 1ST HOSP IP/OBS MODERATE 55: CPT | Performed by: INTERNAL MEDICINE

## 2020-01-01 PROCEDURE — C9803 HOPD COVID-19 SPEC COLLECT: HCPCS

## 2020-01-01 PROCEDURE — 2500000003 HC RX 250 WO HCPCS

## 2020-01-01 PROCEDURE — C1752 CATH,HEMODIALYSIS,SHORT-TERM: HCPCS

## 2020-01-01 PROCEDURE — 84484 ASSAY OF TROPONIN QUANT: CPT

## 2020-01-01 PROCEDURE — 82306 VITAMIN D 25 HYDROXY: CPT

## 2020-01-01 PROCEDURE — 31720 CLEARANCE OF AIRWAYS: CPT

## 2020-01-01 PROCEDURE — 93306 TTE W/DOPPLER COMPLETE: CPT

## 2020-01-01 PROCEDURE — 96368 THER/DIAG CONCURRENT INF: CPT

## 2020-01-01 PROCEDURE — 36556 INSERT NON-TUNNEL CV CATH: CPT

## 2020-01-01 PROCEDURE — 87070 CULTURE OTHR SPECIMN AEROBIC: CPT

## 2020-01-01 PROCEDURE — 0BH17EZ INSERTION OF ENDOTRACHEAL AIRWAY INTO TRACHEA, VIA NATURAL OR ARTIFICIAL OPENING: ICD-10-PCS | Performed by: STUDENT IN AN ORGANIZED HEALTH CARE EDUCATION/TRAINING PROGRAM

## 2020-01-01 PROCEDURE — 86901 BLOOD TYPING SEROLOGIC RH(D): CPT

## 2020-01-01 PROCEDURE — 2720000010 HC SURG SUPPLY STERILE

## 2020-01-01 PROCEDURE — 84145 PROCALCITONIN (PCT): CPT

## 2020-01-01 PROCEDURE — 87205 SMEAR GRAM STAIN: CPT

## 2020-01-01 PROCEDURE — 93005 ELECTROCARDIOGRAM TRACING: CPT | Performed by: PHYSICIAN ASSISTANT

## 2020-01-01 PROCEDURE — 36600 WITHDRAWAL OF ARTERIAL BLOOD: CPT

## 2020-01-01 PROCEDURE — 84155 ASSAY OF PROTEIN SERUM: CPT

## 2020-01-01 PROCEDURE — 6370000000 HC RX 637 (ALT 250 FOR IP): Performed by: STUDENT IN AN ORGANIZED HEALTH CARE EDUCATION/TRAINING PROGRAM

## 2020-01-01 PROCEDURE — 84075 ASSAY ALKALINE PHOSPHATASE: CPT

## 2020-01-01 PROCEDURE — 82247 BILIRUBIN TOTAL: CPT

## 2020-01-01 PROCEDURE — 86850 RBC ANTIBODY SCREEN: CPT

## 2020-01-01 PROCEDURE — 72170 X-RAY EXAM OF PELVIS: CPT

## 2020-01-01 PROCEDURE — 93005 ELECTROCARDIOGRAM TRACING: CPT | Performed by: STUDENT IN AN ORGANIZED HEALTH CARE EDUCATION/TRAINING PROGRAM

## 2020-01-01 PROCEDURE — 99285 EMERGENCY DEPT VISIT HI MDM: CPT

## 2020-01-01 PROCEDURE — 96375 TX/PRO/DX INJ NEW DRUG ADDON: CPT

## 2020-01-01 PROCEDURE — 6360000002 HC RX W HCPCS

## 2020-01-01 PROCEDURE — 31500 INSERT EMERGENCY AIRWAY: CPT

## 2020-01-01 PROCEDURE — 80202 ASSAY OF VANCOMYCIN: CPT

## 2020-01-01 PROCEDURE — 84460 ALANINE AMINO (ALT) (SGPT): CPT

## 2020-01-01 PROCEDURE — 36592 COLLECT BLOOD FROM PICC: CPT

## 2020-01-01 PROCEDURE — 82728 ASSAY OF FERRITIN: CPT

## 2020-01-01 PROCEDURE — 87077 CULTURE AEROBIC IDENTIFY: CPT

## 2020-01-01 PROCEDURE — 85014 HEMATOCRIT: CPT

## 2020-01-01 PROCEDURE — 76937 US GUIDE VASCULAR ACCESS: CPT

## 2020-01-01 PROCEDURE — 74018 RADEX ABDOMEN 1 VIEW: CPT

## 2020-01-01 PROCEDURE — 94660 CPAP INITIATION&MGMT: CPT

## 2020-01-01 PROCEDURE — 99213 OFFICE O/P EST LOW 20 MIN: CPT

## 2020-01-01 PROCEDURE — 82040 ASSAY OF SERUM ALBUMIN: CPT

## 2020-01-01 PROCEDURE — 83880 ASSAY OF NATRIURETIC PEPTIDE: CPT

## 2020-01-01 PROCEDURE — 87081 CULTURE SCREEN ONLY: CPT

## 2020-01-01 PROCEDURE — 36620 INSERTION CATHETER ARTERY: CPT

## 2020-01-01 PROCEDURE — 99284 EMERGENCY DEPT VISIT MOD MDM: CPT

## 2020-01-01 PROCEDURE — 84134 ASSAY OF PREALBUMIN: CPT

## 2020-01-01 PROCEDURE — 87804 INFLUENZA ASSAY W/OPTIC: CPT

## 2020-01-01 PROCEDURE — 85018 HEMOGLOBIN: CPT

## 2020-01-01 PROCEDURE — 83615 LACTATE (LD) (LDH) ENZYME: CPT

## 2020-01-01 PROCEDURE — 93970 EXTREMITY STUDY: CPT

## 2020-01-01 PROCEDURE — 36430 TRANSFUSION BLD/BLD COMPNT: CPT

## 2020-01-01 PROCEDURE — 2580000003 HC RX 258: Performed by: NURSE PRACTITIONER

## 2020-01-01 PROCEDURE — 36569 INSJ PICC 5 YR+ W/O IMAGING: CPT

## 2020-01-01 PROCEDURE — 82570 ASSAY OF URINE CREATININE: CPT

## 2020-01-01 PROCEDURE — U0002 COVID-19 LAB TEST NON-CDC: HCPCS

## 2020-01-01 PROCEDURE — 6370000000 HC RX 637 (ALT 250 FOR IP): Performed by: EMERGENCY MEDICINE

## 2020-01-01 PROCEDURE — 96367 TX/PROPH/DG ADDL SEQ IV INF: CPT

## 2020-01-01 PROCEDURE — 6360000002 HC RX W HCPCS: Performed by: NURSE PRACTITIONER

## 2020-01-01 PROCEDURE — 5A1D90Z PERFORMANCE OF URINARY FILTRATION, CONTINUOUS, GREATER THAN 18 HOURS PER DAY: ICD-10-PCS | Performed by: STUDENT IN AN ORGANIZED HEALTH CARE EDUCATION/TRAINING PROGRAM

## 2020-01-01 PROCEDURE — 94002 VENT MGMT INPAT INIT DAY: CPT

## 2020-01-01 PROCEDURE — XW13325 TRANSFUSION OF CONVALESCENT PLASMA (NONAUTOLOGOUS) INTO PERIPHERAL VEIN, PERCUTANEOUS APPROACH, NEW TECHNOLOGY GROUP 5: ICD-10-PCS | Performed by: STUDENT IN AN ORGANIZED HEALTH CARE EDUCATION/TRAINING PROGRAM

## 2020-01-01 PROCEDURE — XW033E5 INTRODUCTION OF REMDESIVIR ANTI-INFECTIVE INTO PERIPHERAL VEIN, PERCUTANEOUS APPROACH, NEW TECHNOLOGY GROUP 5: ICD-10-PCS | Performed by: STUDENT IN AN ORGANIZED HEALTH CARE EDUCATION/TRAINING PROGRAM

## 2020-01-01 PROCEDURE — 83520 IMMUNOASSAY QUANT NOS NONAB: CPT

## 2020-01-01 PROCEDURE — 6360000004 HC RX CONTRAST MEDICATION: Performed by: EMERGENCY MEDICINE

## 2020-01-01 PROCEDURE — P9045 ALBUMIN (HUMAN), 5%, 250 ML: HCPCS | Performed by: INTERNAL MEDICINE

## 2020-01-01 PROCEDURE — 86900 BLOOD TYPING SEROLOGIC ABO: CPT

## 2020-01-01 PROCEDURE — 5A1955Z RESPIRATORY VENTILATION, GREATER THAN 96 CONSECUTIVE HOURS: ICD-10-PCS | Performed by: STUDENT IN AN ORGANIZED HEALTH CARE EDUCATION/TRAINING PROGRAM

## 2020-01-01 RX ORDER — MORPHINE SULFATE 20 MG/ML
5 SOLUTION ORAL
Status: DISCONTINUED | OUTPATIENT
Start: 2020-01-01 | End: 2020-12-08 | Stop reason: HOSPADM

## 2020-01-01 RX ORDER — DEXAMETHASONE 4 MG/1
6 TABLET ORAL DAILY
Status: DISCONTINUED | OUTPATIENT
Start: 2020-01-01 | End: 2020-01-01

## 2020-01-01 RX ORDER — SODIUM CHLORIDE 9 MG/ML
INJECTION, SOLUTION INTRAVENOUS CONTINUOUS
Status: DISCONTINUED | OUTPATIENT
Start: 2020-01-01 | End: 2020-01-01 | Stop reason: HOSPADM

## 2020-01-01 RX ORDER — BUMETANIDE 0.25 MG/ML
2 INJECTION, SOLUTION INTRAMUSCULAR; INTRAVENOUS 2 TIMES DAILY
Status: DISCONTINUED | OUTPATIENT
Start: 2020-01-01 | End: 2020-12-08 | Stop reason: HOSPADM

## 2020-01-01 RX ORDER — SODIUM CHLORIDE 0.9 % (FLUSH) 0.9 %
10 SYRINGE (ML) INJECTION PRN
Status: DISCONTINUED | OUTPATIENT
Start: 2020-01-01 | End: 2020-12-08 | Stop reason: HOSPADM

## 2020-01-01 RX ORDER — NICOTINE POLACRILEX 4 MG/1
20 GUM, CHEWING ORAL DAILY
COMMUNITY

## 2020-01-01 RX ORDER — METHYLPREDNISOLONE SODIUM SUCCINATE 40 MG/ML
40 INJECTION, POWDER, LYOPHILIZED, FOR SOLUTION INTRAMUSCULAR; INTRAVENOUS DAILY
Status: DISCONTINUED | OUTPATIENT
Start: 2020-01-01 | End: 2020-01-01

## 2020-01-01 RX ORDER — 0.9 % SODIUM CHLORIDE 0.9 %
20 INTRAVENOUS SOLUTION INTRAVENOUS ONCE
Status: COMPLETED | OUTPATIENT
Start: 2020-01-01 | End: 2020-01-01

## 2020-01-01 RX ORDER — ACETAMINOPHEN 325 MG/1
650 TABLET ORAL EVERY 6 HOURS PRN
Status: DISCONTINUED | OUTPATIENT
Start: 2020-01-01 | End: 2020-12-08 | Stop reason: HOSPADM

## 2020-01-01 RX ORDER — LIDOCAINE HYDROCHLORIDE 10 MG/ML
5 INJECTION, SOLUTION EPIDURAL; INFILTRATION; INTRACAUDAL; PERINEURAL ONCE
Status: COMPLETED | OUTPATIENT
Start: 2020-01-01 | End: 2020-01-01

## 2020-01-01 RX ORDER — BUMETANIDE 0.25 MG/ML
2 INJECTION, SOLUTION INTRAMUSCULAR; INTRAVENOUS ONCE
Status: COMPLETED | OUTPATIENT
Start: 2020-01-01 | End: 2020-01-01

## 2020-01-01 RX ORDER — LOSARTAN POTASSIUM AND HYDROCHLOROTHIAZIDE 12.5; 5 MG/1; MG/1
2 TABLET ORAL DAILY
Status: DISCONTINUED | OUTPATIENT
Start: 2020-01-01 | End: 2020-01-01

## 2020-01-01 RX ORDER — CHLORHEXIDINE GLUCONATE 0.12 MG/ML
15 RINSE ORAL 2 TIMES DAILY
Status: DISCONTINUED | OUTPATIENT
Start: 2020-01-01 | End: 2020-01-01

## 2020-01-01 RX ORDER — FENTANYL CITRATE 50 UG/ML
25 INJECTION, SOLUTION INTRAMUSCULAR; INTRAVENOUS ONCE
Status: COMPLETED | OUTPATIENT
Start: 2020-01-01 | End: 2020-01-01

## 2020-01-01 RX ORDER — SODIUM CHLORIDE 0.9 % (FLUSH) 0.9 %
10 SYRINGE (ML) INJECTION EVERY 12 HOURS SCHEDULED
Status: DISCONTINUED | OUTPATIENT
Start: 2020-01-01 | End: 2020-12-08 | Stop reason: HOSPADM

## 2020-01-01 RX ORDER — ALBUMIN, HUMAN INJ 5% 5 %
12.5 SOLUTION INTRAVENOUS ONCE
Status: COMPLETED | OUTPATIENT
Start: 2020-01-01 | End: 2020-01-01

## 2020-01-01 RX ORDER — DEXAMETHASONE 4 MG/1
8 TABLET ORAL ONCE
Status: COMPLETED | OUTPATIENT
Start: 2020-01-01 | End: 2020-01-01

## 2020-01-01 RX ORDER — 0.9 % SODIUM CHLORIDE 0.9 %
30 INTRAVENOUS SOLUTION INTRAVENOUS PRN
Status: DISCONTINUED | OUTPATIENT
Start: 2020-01-01 | End: 2020-12-08 | Stop reason: HOSPADM

## 2020-01-01 RX ORDER — ATROPINE SULFATE 0.1 MG/ML
0.5 INJECTION INTRAVENOUS
Status: DISPENSED | OUTPATIENT
Start: 2020-01-01 | End: 2020-01-01

## 2020-01-01 RX ORDER — FUROSEMIDE 10 MG/ML
20 INJECTION INTRAMUSCULAR; INTRAVENOUS ONCE
Status: DISCONTINUED | OUTPATIENT
Start: 2020-01-01 | End: 2020-01-01

## 2020-01-01 RX ORDER — ALBUMIN (HUMAN) 12.5 G/50ML
25 SOLUTION INTRAVENOUS EVERY 6 HOURS
Status: DISPENSED | OUTPATIENT
Start: 2020-01-01 | End: 2020-01-01

## 2020-01-01 RX ORDER — POLYETHYLENE GLYCOL 3350 17 G/17G
17 POWDER, FOR SOLUTION ORAL DAILY PRN
Status: DISCONTINUED | OUTPATIENT
Start: 2020-01-01 | End: 2020-12-08 | Stop reason: HOSPADM

## 2020-01-01 RX ORDER — LIDOCAINE HYDROCHLORIDE 10 MG/ML
5 INJECTION, SOLUTION EPIDURAL; INFILTRATION; INTRACAUDAL; PERINEURAL SEE ADMIN INSTRUCTIONS
Status: DISCONTINUED | OUTPATIENT
Start: 2020-01-01 | End: 2020-12-08 | Stop reason: HOSPADM

## 2020-01-01 RX ORDER — POTASSIUM CHLORIDE 20 MEQ/1
40 TABLET, EXTENDED RELEASE ORAL ONCE
Status: COMPLETED | OUTPATIENT
Start: 2020-01-01 | End: 2020-01-01

## 2020-01-01 RX ORDER — HEPARIN SODIUM 1000 [USP'U]/ML
2000 INJECTION, SOLUTION INTRAVENOUS; SUBCUTANEOUS PRN
Status: DISCONTINUED | OUTPATIENT
Start: 2020-01-01 | End: 2020-12-08 | Stop reason: HOSPADM

## 2020-01-01 RX ORDER — MAGNESIUM SULFATE 1 G/100ML
1 INJECTION INTRAVENOUS PRN
Status: DISCONTINUED | OUTPATIENT
Start: 2020-01-01 | End: 2020-12-08 | Stop reason: HOSPADM

## 2020-01-01 RX ORDER — AZITHROMYCIN 500 MG/1
500 INJECTION, POWDER, LYOPHILIZED, FOR SOLUTION INTRAVENOUS ONCE
Status: DISCONTINUED | OUTPATIENT
Start: 2020-01-01 | End: 2020-01-01 | Stop reason: CLARIF

## 2020-01-01 RX ORDER — MAGNESIUM SULFATE IN WATER 40 MG/ML
2 INJECTION, SOLUTION INTRAVENOUS ONCE
Status: COMPLETED | OUTPATIENT
Start: 2020-01-01 | End: 2020-01-01

## 2020-01-01 RX ORDER — LORAZEPAM 2 MG/ML
1 INJECTION INTRAMUSCULAR EVERY 6 HOURS PRN
Status: DISCONTINUED | OUTPATIENT
Start: 2020-01-01 | End: 2020-12-08 | Stop reason: HOSPADM

## 2020-01-01 RX ORDER — FUROSEMIDE 10 MG/ML
80 INJECTION INTRAMUSCULAR; INTRAVENOUS ONCE
Status: COMPLETED | OUTPATIENT
Start: 2020-01-01 | End: 2020-01-01

## 2020-01-01 RX ORDER — ACETAMINOPHEN 650 MG/1
650 SUPPOSITORY RECTAL EVERY 6 HOURS PRN
Status: DISCONTINUED | OUTPATIENT
Start: 2020-01-01 | End: 2020-12-08 | Stop reason: HOSPADM

## 2020-01-01 RX ORDER — DIPHENOXYLATE HYDROCHLORIDE AND ATROPINE SULFATE 2.5; .025 MG/1; MG/1
1 TABLET ORAL 4 TIMES DAILY PRN
Status: DISCONTINUED | OUTPATIENT
Start: 2020-01-01 | End: 2020-12-08 | Stop reason: HOSPADM

## 2020-01-01 RX ORDER — POTASSIUM CHLORIDE 7.45 MG/ML
10 INJECTION INTRAVENOUS PRN
Status: DISCONTINUED | OUTPATIENT
Start: 2020-01-01 | End: 2020-01-01

## 2020-01-01 RX ORDER — ONDANSETRON 2 MG/ML
4 INJECTION INTRAMUSCULAR; INTRAVENOUS EVERY 6 HOURS PRN
Status: DISCONTINUED | OUTPATIENT
Start: 2020-01-01 | End: 2020-12-08 | Stop reason: HOSPADM

## 2020-01-01 RX ORDER — POTASSIUM CHLORIDE 29.8 MG/ML
20 INJECTION INTRAVENOUS PRN
Status: DISCONTINUED | OUTPATIENT
Start: 2020-01-01 | End: 2020-12-08 | Stop reason: HOSPADM

## 2020-01-01 RX ORDER — GUAIFENESIN/DEXTROMETHORPHAN 100-10MG/5
5 SYRUP ORAL EVERY 4 HOURS PRN
Status: DISCONTINUED | OUTPATIENT
Start: 2020-01-01 | End: 2020-12-08 | Stop reason: HOSPADM

## 2020-01-01 RX ORDER — ATROPINE SULFATE 0.1 MG/ML
INJECTION INTRAVENOUS
Status: DISCONTINUED
Start: 2020-01-01 | End: 2020-01-01 | Stop reason: WASHOUT

## 2020-01-01 RX ORDER — PROMETHAZINE HYDROCHLORIDE 25 MG/1
12.5 TABLET ORAL EVERY 6 HOURS PRN
Status: DISCONTINUED | OUTPATIENT
Start: 2020-01-01 | End: 2020-12-08 | Stop reason: HOSPADM

## 2020-01-01 RX ORDER — AZITHROMYCIN 250 MG/1
250 TABLET, FILM COATED ORAL SEE ADMIN INSTRUCTIONS
Qty: 6 TABLET | Refills: 0 | Status: SHIPPED | OUTPATIENT
Start: 2020-01-01 | End: 2020-01-01

## 2020-01-01 RX ORDER — NICOTINE POLACRILEX 4 MG
15 LOZENGE BUCCAL PRN
Status: DISCONTINUED | OUTPATIENT
Start: 2020-01-01 | End: 2020-12-08 | Stop reason: HOSPADM

## 2020-01-01 RX ORDER — ATORVASTATIN CALCIUM 40 MG/1
40 TABLET, FILM COATED ORAL DAILY
Status: DISCONTINUED | OUTPATIENT
Start: 2020-01-01 | End: 2020-12-08 | Stop reason: HOSPADM

## 2020-01-01 RX ORDER — DEXTROSE MONOHYDRATE 50 MG/ML
100 INJECTION, SOLUTION INTRAVENOUS PRN
Status: DISCONTINUED | OUTPATIENT
Start: 2020-01-01 | End: 2020-12-08 | Stop reason: HOSPADM

## 2020-01-01 RX ORDER — SODIUM CHLORIDE 9 MG/ML
INJECTION, SOLUTION INTRAVENOUS CONTINUOUS
Status: DISCONTINUED | OUTPATIENT
Start: 2020-01-01 | End: 2020-01-01

## 2020-01-01 RX ORDER — AMOXICILLIN AND CLAVULANATE POTASSIUM 875; 125 MG/1; MG/1
1 TABLET, FILM COATED ORAL 2 TIMES DAILY
Qty: 20 TABLET | Refills: 0 | Status: SHIPPED | OUTPATIENT
Start: 2020-01-01 | End: 2020-01-01

## 2020-01-01 RX ORDER — SUCCINYLCHOLINE/SOD CL,ISO/PF 100 MG/5ML
SYRINGE (ML) INTRAVENOUS PRN
Status: DISCONTINUED | OUTPATIENT
Start: 2020-01-01 | End: 2020-01-01 | Stop reason: HOSPADM

## 2020-01-01 RX ORDER — FENTANYL CITRATE 50 UG/ML
INJECTION, SOLUTION INTRAMUSCULAR; INTRAVENOUS
Status: COMPLETED
Start: 2020-01-01 | End: 2020-01-01

## 2020-01-01 RX ORDER — BUMETANIDE 0.25 MG/ML
2 INJECTION, SOLUTION INTRAMUSCULAR; INTRAVENOUS EVERY 6 HOURS
Status: COMPLETED | OUTPATIENT
Start: 2020-01-01 | End: 2020-01-01

## 2020-01-01 RX ORDER — HEPARIN SODIUM 1000 [USP'U]/ML
2000 INJECTION, SOLUTION INTRAVENOUS; SUBCUTANEOUS
Status: COMPLETED | OUTPATIENT
Start: 2020-01-01 | End: 2020-01-01

## 2020-01-01 RX ORDER — MINERAL OIL AND PETROLATUM 150; 830 MG/G; MG/G
OINTMENT OPHTHALMIC EVERY 4 HOURS PRN
Status: DISCONTINUED | OUTPATIENT
Start: 2020-01-01 | End: 2020-12-08 | Stop reason: HOSPADM

## 2020-01-01 RX ORDER — SODIUM CHLORIDE, SODIUM LACTATE, POTASSIUM CHLORIDE, CALCIUM CHLORIDE 600; 310; 30; 20 MG/100ML; MG/100ML; MG/100ML; MG/100ML
1000 INJECTION, SOLUTION INTRAVENOUS ONCE
Status: COMPLETED | OUTPATIENT
Start: 2020-01-01 | End: 2020-01-01

## 2020-01-01 RX ORDER — DEXTROSE MONOHYDRATE 25 G/50ML
12.5 INJECTION, SOLUTION INTRAVENOUS PRN
Status: DISCONTINUED | OUTPATIENT
Start: 2020-01-01 | End: 2020-12-08 | Stop reason: HOSPADM

## 2020-01-01 RX ORDER — 0.9 % SODIUM CHLORIDE 0.9 %
2000 INTRAVENOUS SOLUTION INTRAVENOUS PRN
Status: DISCONTINUED | OUTPATIENT
Start: 2020-01-01 | End: 2020-12-08 | Stop reason: HOSPADM

## 2020-01-01 RX ORDER — ALBUTEROL SULFATE 90 UG/1
4 AEROSOL, METERED RESPIRATORY (INHALATION) EVERY 4 HOURS
Status: DISCONTINUED | OUTPATIENT
Start: 2020-01-01 | End: 2020-01-01

## 2020-01-01 RX ORDER — ACETAMINOPHEN 500 MG
1000 TABLET ORAL ONCE
Status: COMPLETED | OUTPATIENT
Start: 2020-01-01 | End: 2020-01-01

## 2020-01-01 RX ORDER — DEXAMETHASONE SODIUM PHOSPHATE 10 MG/ML
10 INJECTION, SOLUTION INTRAMUSCULAR; INTRAVENOUS ONCE
Status: COMPLETED | OUTPATIENT
Start: 2020-01-01 | End: 2020-01-01

## 2020-01-01 RX ORDER — HYDROXYZINE HYDROCHLORIDE 50 MG/ML
50 INJECTION, SOLUTION INTRAMUSCULAR EVERY 6 HOURS PRN
Status: DISCONTINUED | OUTPATIENT
Start: 2020-01-01 | End: 2020-12-08 | Stop reason: HOSPADM

## 2020-01-01 RX ORDER — PROPOFOL 10 MG/ML
INJECTION, EMULSION INTRAVENOUS PRN
Status: DISCONTINUED | OUTPATIENT
Start: 2020-01-01 | End: 2020-01-01 | Stop reason: HOSPADM

## 2020-01-01 RX ORDER — LOSARTAN POTASSIUM 100 MG/1
100 TABLET ORAL DAILY
Status: DISCONTINUED | OUTPATIENT
Start: 2020-01-01 | End: 2020-01-01

## 2020-01-01 RX ORDER — GLYCOPYRROLATE 0.2 MG/ML
0.2 INJECTION INTRAMUSCULAR; INTRAVENOUS EVERY 4 HOURS PRN
Status: DISCONTINUED | OUTPATIENT
Start: 2020-01-01 | End: 2020-12-08 | Stop reason: HOSPADM

## 2020-01-01 RX ORDER — KETOROLAC TROMETHAMINE 15 MG/ML
15 INJECTION, SOLUTION INTRAMUSCULAR; INTRAVENOUS ONCE
Status: COMPLETED | OUTPATIENT
Start: 2020-01-01 | End: 2020-01-01

## 2020-01-01 RX ORDER — FAMOTIDINE 40 MG/1
40 TABLET, FILM COATED ORAL DAILY
COMMUNITY

## 2020-01-01 RX ORDER — PANTOPRAZOLE SODIUM 40 MG/1
40 TABLET, DELAYED RELEASE ORAL
Status: DISCONTINUED | OUTPATIENT
Start: 2020-01-01 | End: 2020-01-01

## 2020-01-01 RX ORDER — PROPOFOL 10 MG/ML
10 INJECTION, EMULSION INTRAVENOUS CONTINUOUS
Status: DISCONTINUED | OUTPATIENT
Start: 2020-01-01 | End: 2020-01-01

## 2020-01-01 RX ADMIN — HEPARIN SODIUM: 5000 INJECTION, SOLUTION INTRAVENOUS; SUBCUTANEOUS at 14:00

## 2020-01-01 RX ADMIN — PROPOFOL 80 MCG/KG/MIN: 10 INJECTION, EMULSION INTRAVENOUS at 05:44

## 2020-01-01 RX ADMIN — Medication 200 MCG/HR: at 04:22

## 2020-01-01 RX ADMIN — IPRATROPIUM BROMIDE 4 PUFF: 17 AEROSOL, METERED RESPIRATORY (INHALATION) at 19:00

## 2020-01-01 RX ADMIN — MEROPENEM 1 G: 1 INJECTION, POWDER, FOR SOLUTION INTRAVENOUS at 09:42

## 2020-01-01 RX ADMIN — Medication 1500 ML/HR: at 23:20

## 2020-01-01 RX ADMIN — Medication 2000 ML/HR: at 12:55

## 2020-01-01 RX ADMIN — SODIUM BICARBONATE: 84 INJECTION, SOLUTION INTRAVENOUS at 09:41

## 2020-01-01 RX ADMIN — Medication 200 MCG/HR: at 08:48

## 2020-01-01 RX ADMIN — LOSARTAN POTASSIUM 100 MG: 100 TABLET, FILM COATED ORAL at 09:55

## 2020-01-01 RX ADMIN — IPRATROPIUM BROMIDE 4 PUFF: 17 AEROSOL, METERED RESPIRATORY (INHALATION) at 12:41

## 2020-01-01 RX ADMIN — CEFTRIAXONE SODIUM 1 G: 1 INJECTION, POWDER, FOR SOLUTION INTRAMUSCULAR; INTRAVENOUS at 17:23

## 2020-01-01 RX ADMIN — SODIUM CHLORIDE, PRESERVATIVE FREE 10 ML: 5 INJECTION INTRAVENOUS at 11:05

## 2020-01-01 RX ADMIN — Medication: at 10:03

## 2020-01-01 RX ADMIN — KETOROLAC TROMETHAMINE 15 MG: 15 INJECTION, SOLUTION INTRAMUSCULAR; INTRAVENOUS at 20:30

## 2020-01-01 RX ADMIN — SODIUM CHLORIDE, PRESERVATIVE FREE 10 ML: 5 INJECTION INTRAVENOUS at 20:03

## 2020-01-01 RX ADMIN — AZITHROMYCIN MONOHYDRATE 500 MG: 500 INJECTION, POWDER, LYOPHILIZED, FOR SOLUTION INTRAVENOUS at 22:23

## 2020-01-01 RX ADMIN — CHLORHEXIDINE GLUCONATE 0.12% ORAL RINSE 15 ML: 1.2 LIQUID ORAL at 07:57

## 2020-01-01 RX ADMIN — Medication 200 MCG/HR: at 02:18

## 2020-01-01 RX ADMIN — SODIUM CHLORIDE, PRESERVATIVE FREE 10 ML: 5 INJECTION INTRAVENOUS at 21:47

## 2020-01-01 RX ADMIN — ALBUTEROL SULFATE 4 PUFF: 90 AEROSOL, METERED RESPIRATORY (INHALATION) at 15:02

## 2020-01-01 RX ADMIN — SODIUM CHLORIDE, PRESERVATIVE FREE 10 ML: 5 INJECTION INTRAVENOUS at 20:38

## 2020-01-01 RX ADMIN — ATORVASTATIN CALCIUM 40 MG: 40 TABLET, FILM COATED ORAL at 08:52

## 2020-01-01 RX ADMIN — POTASSIUM CHLORIDE 20 MEQ: 29.8 INJECTION, SOLUTION INTRAVENOUS at 16:10

## 2020-01-01 RX ADMIN — Medication: at 17:39

## 2020-01-01 RX ADMIN — MEROPENEM 1 G: 1 INJECTION, POWDER, FOR SOLUTION INTRAVENOUS at 08:57

## 2020-01-01 RX ADMIN — SODIUM CHLORIDE: 9 INJECTION, SOLUTION INTRAVENOUS at 17:51

## 2020-01-01 RX ADMIN — Medication 1000 ML/HR: at 01:00

## 2020-01-01 RX ADMIN — Medication 200 MCG/HR: at 20:57

## 2020-01-01 RX ADMIN — LOSARTAN POTASSIUM 100 MG: 100 TABLET, FILM COATED ORAL at 08:57

## 2020-01-01 RX ADMIN — ALBUTEROL SULFATE 4 PUFF: 90 AEROSOL, METERED RESPIRATORY (INHALATION) at 20:56

## 2020-01-01 RX ADMIN — FAMOTIDINE 20 MG: 10 INJECTION INTRAVENOUS at 20:58

## 2020-01-01 RX ADMIN — IPRATROPIUM BROMIDE 4 PUFF: 17 AEROSOL, METERED RESPIRATORY (INHALATION) at 11:29

## 2020-01-01 RX ADMIN — CALCIUM GLUCONATE 2 G: 98 INJECTION, SOLUTION INTRAVENOUS at 16:10

## 2020-01-01 RX ADMIN — HYDROCORTISONE SODIUM SUCCINATE 100 MG: 100 INJECTION, POWDER, FOR SOLUTION INTRAMUSCULAR; INTRAVENOUS at 21:00

## 2020-01-01 RX ADMIN — PROPOFOL 50 MCG/KG/MIN: 10 INJECTION, EMULSION INTRAVENOUS at 21:22

## 2020-01-01 RX ADMIN — SODIUM BICARBONATE 100 MEQ: 84 INJECTION INTRAVENOUS at 10:40

## 2020-01-01 RX ADMIN — ATORVASTATIN CALCIUM 40 MG: 40 TABLET, FILM COATED ORAL at 11:03

## 2020-01-01 RX ADMIN — PROPOFOL 80 MCG/KG/MIN: 10 INJECTION, EMULSION INTRAVENOUS at 18:41

## 2020-01-01 RX ADMIN — IPRATROPIUM BROMIDE 4 PUFF: 17 AEROSOL, METERED RESPIRATORY (INHALATION) at 11:58

## 2020-01-01 RX ADMIN — SODIUM CHLORIDE, PRESERVATIVE FREE 10 ML: 5 INJECTION INTRAVENOUS at 11:06

## 2020-01-01 RX ADMIN — PROPOFOL 80 MCG/KG/MIN: 10 INJECTION, EMULSION INTRAVENOUS at 01:15

## 2020-01-01 RX ADMIN — IPRATROPIUM BROMIDE 4 PUFF: 17 AEROSOL, METERED RESPIRATORY (INHALATION) at 11:30

## 2020-01-01 RX ADMIN — Medication 2000 UNITS: at 09:14

## 2020-01-01 RX ADMIN — CEFTRIAXONE 2 G: 2 INJECTION, POWDER, FOR SOLUTION INTRAMUSCULAR; INTRAVENOUS at 21:51

## 2020-01-01 RX ADMIN — Medication 200 MCG/HR: at 20:40

## 2020-01-01 RX ADMIN — PROPOFOL 80 MCG/KG/MIN: 10 INJECTION, EMULSION INTRAVENOUS at 00:15

## 2020-01-01 RX ADMIN — PROPOFOL 80 MCG/KG/MIN: 10 INJECTION, EMULSION INTRAVENOUS at 05:59

## 2020-01-01 RX ADMIN — CEFEPIME 2 G: 2 INJECTION, POWDER, FOR SOLUTION INTRAVENOUS at 05:06

## 2020-01-01 RX ADMIN — PROPOFOL 25 MCG/KG/MIN: 10 INJECTION, EMULSION INTRAVENOUS at 00:45

## 2020-01-01 RX ADMIN — VASOPRESSIN 0.04 UNITS/MIN: 20 INJECTION INTRAVENOUS at 16:16

## 2020-01-01 RX ADMIN — LOSARTAN POTASSIUM 100 MG: 100 TABLET, FILM COATED ORAL at 11:03

## 2020-01-01 RX ADMIN — Medication 1000 ML/HR: at 22:20

## 2020-01-01 RX ADMIN — SODIUM CHLORIDE, PRESERVATIVE FREE 10 ML: 5 INJECTION INTRAVENOUS at 20:32

## 2020-01-01 RX ADMIN — REMDESIVIR 200 MG: 100 INJECTION, POWDER, LYOPHILIZED, FOR SOLUTION INTRAVENOUS at 16:01

## 2020-01-01 RX ADMIN — SODIUM CHLORIDE, PRESERVATIVE FREE 10 ML: 5 INJECTION INTRAVENOUS at 08:45

## 2020-01-01 RX ADMIN — Medication 100 MCG/HR: at 22:16

## 2020-01-01 RX ADMIN — SODIUM CHLORIDE, PRESERVATIVE FREE 10 ML: 5 INJECTION INTRAVENOUS at 08:01

## 2020-01-01 RX ADMIN — PROPOFOL 60 MCG/KG/MIN: 10 INJECTION, EMULSION INTRAVENOUS at 23:57

## 2020-01-01 RX ADMIN — ALBUTEROL SULFATE 4 PUFF: 90 AEROSOL, METERED RESPIRATORY (INHALATION) at 19:39

## 2020-01-01 RX ADMIN — HEPARIN SODIUM: 5000 INJECTION, SOLUTION INTRAVENOUS; SUBCUTANEOUS at 08:30

## 2020-01-01 RX ADMIN — PROPOFOL 80 MCG/KG/MIN: 10 INJECTION, EMULSION INTRAVENOUS at 15:43

## 2020-01-01 RX ADMIN — ALBUTEROL SULFATE 4 PUFF: 90 AEROSOL, METERED RESPIRATORY (INHALATION) at 00:48

## 2020-01-01 RX ADMIN — SODIUM CHLORIDE: 9 INJECTION, SOLUTION INTRAVENOUS at 09:13

## 2020-01-01 RX ADMIN — Medication 1200 ML/HR: at 07:50

## 2020-01-01 RX ADMIN — ENOXAPARIN SODIUM 30 MG: 30 INJECTION SUBCUTANEOUS at 08:39

## 2020-01-01 RX ADMIN — Medication 2000 ML/HR: at 18:05

## 2020-01-01 RX ADMIN — ALBUTEROL SULFATE 4 PUFF: 90 AEROSOL, METERED RESPIRATORY (INHALATION) at 22:47

## 2020-01-01 RX ADMIN — Medication 200 MCG/HR: at 06:04

## 2020-01-01 RX ADMIN — Medication 2000 ML/HR: at 02:59

## 2020-01-01 RX ADMIN — Medication 500 ML/HR: at 17:11

## 2020-01-01 RX ADMIN — Medication 1000 ML/HR: at 14:34

## 2020-01-01 RX ADMIN — LOSARTAN POTASSIUM 100 MG: 100 TABLET, FILM COATED ORAL at 09:14

## 2020-01-01 RX ADMIN — DEXAMETHASONE SODIUM PHOSPHATE 10 MG: 10 INJECTION, SOLUTION INTRAMUSCULAR; INTRAVENOUS at 16:55

## 2020-01-01 RX ADMIN — Medication 200 MCG/HR: at 18:47

## 2020-01-01 RX ADMIN — PROPOFOL 80 MCG/KG/MIN: 10 INJECTION, EMULSION INTRAVENOUS at 05:43

## 2020-01-01 RX ADMIN — FUROSEMIDE 10 MG/HR: 10 INJECTION, SOLUTION INTRAVENOUS at 23:26

## 2020-01-01 RX ADMIN — AZITHROMYCIN MONOHYDRATE 500 MG: 500 INJECTION, POWDER, LYOPHILIZED, FOR SOLUTION INTRAVENOUS at 18:01

## 2020-01-01 RX ADMIN — PROPOFOL 80 MCG/KG/MIN: 10 INJECTION, EMULSION INTRAVENOUS at 03:31

## 2020-01-01 RX ADMIN — IPRATROPIUM BROMIDE 4 PUFF: 17 AEROSOL, METERED RESPIRATORY (INHALATION) at 07:42

## 2020-01-01 RX ADMIN — FAMOTIDINE 10 MG: 10 INJECTION INTRAVENOUS at 20:57

## 2020-01-01 RX ADMIN — Medication 1000 ML/HR: at 06:23

## 2020-01-01 RX ADMIN — IPRATROPIUM BROMIDE 4 PUFF: 17 AEROSOL, METERED RESPIRATORY (INHALATION) at 11:20

## 2020-01-01 RX ADMIN — Medication 125 MCG/HR: at 06:01

## 2020-01-01 RX ADMIN — ALBUTEROL SULFATE 4 PUFF: 90 AEROSOL, METERED RESPIRATORY (INHALATION) at 19:00

## 2020-01-01 RX ADMIN — SODIUM CHLORIDE, POTASSIUM CHLORIDE, SODIUM LACTATE AND CALCIUM CHLORIDE 1000 ML: 600; 310; 30; 20 INJECTION, SOLUTION INTRAVENOUS at 20:23

## 2020-01-01 RX ADMIN — PROPOFOL 70 MCG/KG/MIN: 10 INJECTION, EMULSION INTRAVENOUS at 18:31

## 2020-01-01 RX ADMIN — PROPOFOL 80 MCG/KG/MIN: 10 INJECTION, EMULSION INTRAVENOUS at 14:33

## 2020-01-01 RX ADMIN — SODIUM CHLORIDE, PRESERVATIVE FREE 10 ML: 5 INJECTION INTRAVENOUS at 08:00

## 2020-01-01 RX ADMIN — ALBUTEROL SULFATE 4 PUFF: 90 AEROSOL, METERED RESPIRATORY (INHALATION) at 03:21

## 2020-01-01 RX ADMIN — SODIUM CHLORIDE 1.5 MCG/KG/MIN: 900 INJECTION, SOLUTION INTRAVENOUS at 15:31

## 2020-01-01 RX ADMIN — CHLORHEXIDINE GLUCONATE 0.12% ORAL RINSE 15 ML: 1.2 LIQUID ORAL at 21:45

## 2020-01-01 RX ADMIN — FAMOTIDINE 20 MG: 10 INJECTION INTRAVENOUS at 20:41

## 2020-01-01 RX ADMIN — PROPOFOL 80 MCG/KG/MIN: 10 INJECTION, EMULSION INTRAVENOUS at 08:14

## 2020-01-01 RX ADMIN — HEPARIN SODIUM 2000 UNITS: 1000 INJECTION, SOLUTION INTRAVENOUS; SUBCUTANEOUS at 23:55

## 2020-01-01 RX ADMIN — ALBUTEROL SULFATE 4 PUFF: 90 AEROSOL, METERED RESPIRATORY (INHALATION) at 09:01

## 2020-01-01 RX ADMIN — PROPOFOL 80 MCG/KG/MIN: 10 INJECTION, EMULSION INTRAVENOUS at 14:13

## 2020-01-01 RX ADMIN — IPRATROPIUM BROMIDE 4 PUFF: 17 AEROSOL, METERED RESPIRATORY (INHALATION) at 19:44

## 2020-01-01 RX ADMIN — HYDROCORTISONE SODIUM SUCCINATE 100 MG: 100 INJECTION, POWDER, FOR SOLUTION INTRAMUSCULAR; INTRAVENOUS at 05:48

## 2020-01-01 RX ADMIN — REMDESIVIR 100 MG: 100 INJECTION, POWDER, LYOPHILIZED, FOR SOLUTION INTRAVENOUS at 15:47

## 2020-01-01 RX ADMIN — ALBUTEROL SULFATE 4 PUFF: 90 AEROSOL, METERED RESPIRATORY (INHALATION) at 15:03

## 2020-01-01 RX ADMIN — ACETAMINOPHEN 650 MG: 325 TABLET ORAL at 05:06

## 2020-01-01 RX ADMIN — Medication: at 05:50

## 2020-01-01 RX ADMIN — Medication 100 MCG/HR: at 09:52

## 2020-01-01 RX ADMIN — Medication 1200 ML/HR: at 23:41

## 2020-01-01 RX ADMIN — FENTANYL CITRATE 25 MCG: 50 INJECTION, SOLUTION INTRAMUSCULAR; INTRAVENOUS at 01:45

## 2020-01-01 RX ADMIN — MEROPENEM 1 G: 1 INJECTION, POWDER, FOR SOLUTION INTRAVENOUS at 14:33

## 2020-01-01 RX ADMIN — PROPOFOL 70 MCG/KG/MIN: 10 INJECTION, EMULSION INTRAVENOUS at 19:52

## 2020-01-01 RX ADMIN — FAMOTIDINE 10 MG: 10 INJECTION INTRAVENOUS at 07:58

## 2020-01-01 RX ADMIN — Medication 2000 ML/HR: at 09:34

## 2020-01-01 RX ADMIN — Medication 2000 ML/HR: at 17:10

## 2020-01-01 RX ADMIN — ALBUTEROL SULFATE 4 PUFF: 90 AEROSOL, METERED RESPIRATORY (INHALATION) at 15:55

## 2020-01-01 RX ADMIN — CEFEPIME 2 G: 2 INJECTION, POWDER, FOR SOLUTION INTRAVENOUS at 05:08

## 2020-01-01 RX ADMIN — ACETAMINOPHEN 650 MG: 325 TABLET ORAL at 13:29

## 2020-01-01 RX ADMIN — ALBUTEROL SULFATE 4 PUFF: 90 AEROSOL, METERED RESPIRATORY (INHALATION) at 20:27

## 2020-01-01 RX ADMIN — SODIUM BICARBONATE: 84 INJECTION, SOLUTION INTRAVENOUS at 11:56

## 2020-01-01 RX ADMIN — IPRATROPIUM BROMIDE 4 PUFF: 17 AEROSOL, METERED RESPIRATORY (INHALATION) at 03:51

## 2020-01-01 RX ADMIN — PROPOFOL 80 MCG/KG/MIN: 10 INJECTION, EMULSION INTRAVENOUS at 06:21

## 2020-01-01 RX ADMIN — ALBUTEROL SULFATE 4 PUFF: 90 AEROSOL, METERED RESPIRATORY (INHALATION) at 23:17

## 2020-01-01 RX ADMIN — PROPOFOL 65 MCG/KG/MIN: 10 INJECTION, EMULSION INTRAVENOUS at 19:20

## 2020-01-01 RX ADMIN — ALBUTEROL SULFATE 4 PUFF: 90 AEROSOL, METERED RESPIRATORY (INHALATION) at 11:28

## 2020-01-01 RX ADMIN — CHLORHEXIDINE GLUCONATE 0.12% ORAL RINSE 15 ML: 1.2 LIQUID ORAL at 08:14

## 2020-01-01 RX ADMIN — SODIUM CHLORIDE, PRESERVATIVE FREE 10 ML: 5 INJECTION INTRAVENOUS at 08:40

## 2020-01-01 RX ADMIN — VASOPRESSIN 0.04 UNITS/MIN: 20 INJECTION INTRAVENOUS at 18:49

## 2020-01-01 RX ADMIN — ALBUTEROL SULFATE 4 PUFF: 90 AEROSOL, METERED RESPIRATORY (INHALATION) at 11:25

## 2020-01-01 RX ADMIN — ALBUTEROL SULFATE 4 PUFF: 90 AEROSOL, METERED RESPIRATORY (INHALATION) at 11:26

## 2020-01-01 RX ADMIN — Medication: at 21:33

## 2020-01-01 RX ADMIN — SODIUM CHLORIDE, PRESERVATIVE FREE 10 ML: 5 INJECTION INTRAVENOUS at 08:14

## 2020-01-01 RX ADMIN — SODIUM CHLORIDE, PRESERVATIVE FREE 10 ML: 5 INJECTION INTRAVENOUS at 20:57

## 2020-01-01 RX ADMIN — HEPARIN SODIUM 2000 UNITS: 1000 INJECTION INTRAVENOUS; SUBCUTANEOUS at 18:23

## 2020-01-01 RX ADMIN — PROPOFOL 60 MCG/KG/MIN: 10 INJECTION, EMULSION INTRAVENOUS at 10:46

## 2020-01-01 RX ADMIN — FAMOTIDINE 10 MG: 10 INJECTION INTRAVENOUS at 11:37

## 2020-01-01 RX ADMIN — PROPOFOL 80 MCG/KG/MIN: 10 INJECTION, EMULSION INTRAVENOUS at 00:30

## 2020-01-01 RX ADMIN — CALCIUM GLUCONATE 1 G: 98 INJECTION, SOLUTION INTRAVENOUS at 13:35

## 2020-01-01 RX ADMIN — SODIUM CHLORIDE, PRESERVATIVE FREE 10 ML: 5 INJECTION INTRAVENOUS at 21:36

## 2020-01-01 RX ADMIN — MEROPENEM 1 G: 1 INJECTION, POWDER, FOR SOLUTION INTRAVENOUS at 13:40

## 2020-01-01 RX ADMIN — IPRATROPIUM BROMIDE 4 PUFF: 17 AEROSOL, METERED RESPIRATORY (INHALATION) at 09:01

## 2020-01-01 RX ADMIN — Medication 75 MCG/HR: at 11:37

## 2020-01-01 RX ADMIN — ENOXAPARIN SODIUM 40 MG: 100 INJECTION SUBCUTANEOUS at 11:04

## 2020-01-01 RX ADMIN — PROPOFOL 80 MCG/KG/MIN: 10 INJECTION, EMULSION INTRAVENOUS at 17:01

## 2020-01-01 RX ADMIN — FAMOTIDINE 10 MG: 10 INJECTION INTRAVENOUS at 21:27

## 2020-01-01 RX ADMIN — REMDESIVIR 100 MG: 100 INJECTION, POWDER, LYOPHILIZED, FOR SOLUTION INTRAVENOUS at 15:06

## 2020-01-01 RX ADMIN — PROPOFOL 80 MCG/KG/MIN: 10 INJECTION, EMULSION INTRAVENOUS at 09:30

## 2020-01-01 RX ADMIN — HYDROCORTISONE SODIUM SUCCINATE 100 MG: 100 INJECTION, POWDER, FOR SOLUTION INTRAMUSCULAR; INTRAVENOUS at 13:40

## 2020-01-01 RX ADMIN — ALTEPLASE 2 MG: 2.2 INJECTION, POWDER, LYOPHILIZED, FOR SOLUTION INTRAVENOUS at 10:34

## 2020-01-01 RX ADMIN — ALBUTEROL SULFATE 4 PUFF: 90 AEROSOL, METERED RESPIRATORY (INHALATION) at 11:29

## 2020-01-01 RX ADMIN — PROPOFOL 35 MCG/KG/MIN: 10 INJECTION, EMULSION INTRAVENOUS at 02:24

## 2020-01-01 RX ADMIN — CEFEPIME 2 G: 2 INJECTION, POWDER, FOR SOLUTION INTRAVENOUS at 15:26

## 2020-01-01 RX ADMIN — IPRATROPIUM BROMIDE 4 PUFF: 17 AEROSOL, METERED RESPIRATORY (INHALATION) at 17:06

## 2020-01-01 RX ADMIN — Medication 1200 ML/HR: at 13:35

## 2020-01-01 RX ADMIN — ALBUTEROL SULFATE 4 PUFF: 90 AEROSOL, METERED RESPIRATORY (INHALATION) at 23:04

## 2020-01-01 RX ADMIN — ENOXAPARIN SODIUM 30 MG: 30 INJECTION SUBCUTANEOUS at 08:55

## 2020-01-01 RX ADMIN — ALBUMIN (HUMAN) 25 G: 0.25 INJECTION, SOLUTION INTRAVENOUS at 15:47

## 2020-01-01 RX ADMIN — PROPOFOL 50 MCG/KG/MIN: 10 INJECTION, EMULSION INTRAVENOUS at 10:04

## 2020-01-01 RX ADMIN — BUMETANIDE 2 MG: 0.25 INJECTION INTRAMUSCULAR; INTRAVENOUS at 00:33

## 2020-01-01 RX ADMIN — PROPOFOL 50 MCG/KG/MIN: 10 INJECTION, EMULSION INTRAVENOUS at 08:05

## 2020-01-01 RX ADMIN — Medication 250 ML/HR: at 07:51

## 2020-01-01 RX ADMIN — ALBUMIN (HUMAN) 25 G: 0.25 INJECTION, SOLUTION INTRAVENOUS at 08:08

## 2020-01-01 RX ADMIN — ALBUTEROL SULFATE 4 PUFF: 90 AEROSOL, METERED RESPIRATORY (INHALATION) at 12:41

## 2020-01-01 RX ADMIN — CHLORHEXIDINE GLUCONATE 0.12% ORAL RINSE 15 ML: 1.2 LIQUID ORAL at 11:38

## 2020-01-01 RX ADMIN — SODIUM CHLORIDE 2 MCG/KG/MIN: 900 INJECTION, SOLUTION INTRAVENOUS at 14:14

## 2020-01-01 RX ADMIN — ENOXAPARIN SODIUM 40 MG: 100 INJECTION SUBCUTANEOUS at 09:13

## 2020-01-01 RX ADMIN — SODIUM CHLORIDE 20 ML: 9 INJECTION, SOLUTION INTRAVENOUS at 17:00

## 2020-01-01 RX ADMIN — PROPOFOL 80 MCG/KG/MIN: 10 INJECTION, EMULSION INTRAVENOUS at 03:32

## 2020-01-01 RX ADMIN — ENOXAPARIN SODIUM 40 MG: 100 INJECTION SUBCUTANEOUS at 07:57

## 2020-01-01 RX ADMIN — PROPOFOL 35 MCG/KG/MIN: 10 INJECTION, EMULSION INTRAVENOUS at 16:08

## 2020-01-01 RX ADMIN — MEROPENEM 1 G: 1 INJECTION, POWDER, FOR SOLUTION INTRAVENOUS at 08:19

## 2020-01-01 RX ADMIN — ALBUTEROL SULFATE 4 PUFF: 90 AEROSOL, METERED RESPIRATORY (INHALATION) at 14:57

## 2020-01-01 RX ADMIN — BUMETANIDE 2 MG: 0.25 INJECTION, SOLUTION INTRAMUSCULAR; INTRAVENOUS at 06:17

## 2020-01-01 RX ADMIN — Medication 1000 ML/HR: at 15:20

## 2020-01-01 RX ADMIN — FAMOTIDINE 10 MG: 10 INJECTION INTRAVENOUS at 08:52

## 2020-01-01 RX ADMIN — ALBUTEROL SULFATE 4 PUFF: 90 AEROSOL, METERED RESPIRATORY (INHALATION) at 11:53

## 2020-01-01 RX ADMIN — Medication 250 ML/HR: at 13:36

## 2020-01-01 RX ADMIN — PROPOFOL 65 MCG/KG/MIN: 10 INJECTION, EMULSION INTRAVENOUS at 15:27

## 2020-01-01 RX ADMIN — IPRATROPIUM BROMIDE 4 PUFF: 17 AEROSOL, METERED RESPIRATORY (INHALATION) at 14:56

## 2020-01-01 RX ADMIN — Medication 1000 ML/HR: at 09:33

## 2020-01-01 RX ADMIN — SODIUM CHLORIDE, PRESERVATIVE FREE 10 ML: 5 INJECTION INTRAVENOUS at 20:37

## 2020-01-01 RX ADMIN — HYDROCORTISONE SODIUM SUCCINATE 100 MG: 100 INJECTION, POWDER, FOR SOLUTION INTRAMUSCULAR; INTRAVENOUS at 13:54

## 2020-01-01 RX ADMIN — CALCIUM GLUCONATE 1 G: 98 INJECTION, SOLUTION INTRAVENOUS at 20:06

## 2020-01-01 RX ADMIN — VASOPRESSIN 0.04 UNITS/MIN: 20 INJECTION INTRAVENOUS at 01:21

## 2020-01-01 RX ADMIN — Medication: at 12:48

## 2020-01-01 RX ADMIN — PROPOFOL 60 MCG/KG/MIN: 10 INJECTION, EMULSION INTRAVENOUS at 03:14

## 2020-01-01 RX ADMIN — Medication 2000 ML/HR: at 04:00

## 2020-01-01 RX ADMIN — VASOPRESSIN 0.04 UNITS/MIN: 20 INJECTION INTRAVENOUS at 11:17

## 2020-01-01 RX ADMIN — SODIUM CHLORIDE, PRESERVATIVE FREE 10 ML: 5 INJECTION INTRAVENOUS at 09:14

## 2020-01-01 RX ADMIN — PROPOFOL 45 MCG/KG/MIN: 10 INJECTION, EMULSION INTRAVENOUS at 20:06

## 2020-01-01 RX ADMIN — HYDROCORTISONE SODIUM SUCCINATE 100 MG: 100 INJECTION, POWDER, FOR SOLUTION INTRAMUSCULAR; INTRAVENOUS at 21:36

## 2020-01-01 RX ADMIN — PROPOFOL 80 MCG/KG/MIN: 10 INJECTION, EMULSION INTRAVENOUS at 12:49

## 2020-01-01 RX ADMIN — IPRATROPIUM BROMIDE 4 PUFF: 17 AEROSOL, METERED RESPIRATORY (INHALATION) at 11:10

## 2020-01-01 RX ADMIN — ALBUTEROL SULFATE 4 PUFF: 90 AEROSOL, METERED RESPIRATORY (INHALATION) at 04:20

## 2020-01-01 RX ADMIN — HEPARIN SODIUM 2000 UNITS: 1000 INJECTION INTRAVENOUS; SUBCUTANEOUS at 12:23

## 2020-01-01 RX ADMIN — Medication 200 MCG/HR: at 23:19

## 2020-01-01 RX ADMIN — Medication 200 MCG/HR: at 08:53

## 2020-01-01 RX ADMIN — IPRATROPIUM BROMIDE 4 PUFF: 17 AEROSOL, METERED RESPIRATORY (INHALATION) at 03:06

## 2020-01-01 RX ADMIN — PROPOFOL 55 MCG/KG/MIN: 10 INJECTION, EMULSION INTRAVENOUS at 12:50

## 2020-01-01 RX ADMIN — LORAZEPAM 1 MG: 2 INJECTION INTRAMUSCULAR; INTRAVENOUS at 22:51

## 2020-01-01 RX ADMIN — PROPOFOL 80 MCG/KG/MIN: 10 INJECTION, EMULSION INTRAVENOUS at 17:39

## 2020-01-01 RX ADMIN — MEROPENEM 1 G: 1 INJECTION, POWDER, FOR SOLUTION INTRAVENOUS at 05:50

## 2020-01-01 RX ADMIN — HYDROCORTISONE SODIUM SUCCINATE 100 MG: 100 INJECTION, POWDER, FOR SOLUTION INTRAMUSCULAR; INTRAVENOUS at 21:45

## 2020-01-01 RX ADMIN — PROPOFOL 45 MCG/KG/MIN: 10 INJECTION, EMULSION INTRAVENOUS at 10:50

## 2020-01-01 RX ADMIN — IPRATROPIUM BROMIDE 4 PUFF: 17 AEROSOL, METERED RESPIRATORY (INHALATION) at 03:22

## 2020-01-01 RX ADMIN — Medication 1500 ML/HR: at 02:50

## 2020-01-01 RX ADMIN — ALBUTEROL SULFATE 4 PUFF: 90 AEROSOL, METERED RESPIRATORY (INHALATION) at 03:50

## 2020-01-01 RX ADMIN — PROPOFOL 40 MCG/KG/MIN: 10 INJECTION, EMULSION INTRAVENOUS at 08:22

## 2020-01-01 RX ADMIN — ALBUTEROL SULFATE 4 PUFF: 90 AEROSOL, METERED RESPIRATORY (INHALATION) at 11:10

## 2020-01-01 RX ADMIN — CHLORHEXIDINE GLUCONATE 0.12% ORAL RINSE 15 ML: 1.2 LIQUID ORAL at 20:51

## 2020-01-01 RX ADMIN — CALCIUM GLUCONATE 1 G: 98 INJECTION, SOLUTION INTRAVENOUS at 23:23

## 2020-01-01 RX ADMIN — FAMOTIDINE 10 MG: 10 INJECTION INTRAVENOUS at 20:27

## 2020-01-01 RX ADMIN — POTASSIUM CHLORIDE 40 MEQ: 1500 TABLET, EXTENDED RELEASE ORAL at 05:06

## 2020-01-01 RX ADMIN — IPRATROPIUM BROMIDE 4 PUFF: 17 AEROSOL, METERED RESPIRATORY (INHALATION) at 20:01

## 2020-01-01 RX ADMIN — MEROPENEM 1 G: 1 INJECTION, POWDER, FOR SOLUTION INTRAVENOUS at 01:16

## 2020-01-01 RX ADMIN — VASOPRESSIN 0.04 UNITS/MIN: 20 INJECTION INTRAVENOUS at 10:45

## 2020-01-01 RX ADMIN — ACETAMINOPHEN 650 MG: 325 TABLET ORAL at 07:57

## 2020-01-01 RX ADMIN — IOPAMIDOL 100 ML: 755 INJECTION, SOLUTION INTRAVENOUS at 21:41

## 2020-01-01 RX ADMIN — SODIUM CHLORIDE 2000 ML: 9 INJECTION, SOLUTION INTRAVENOUS at 08:57

## 2020-01-01 RX ADMIN — PROPOFOL 80 MCG/KG/MIN: 10 INJECTION, EMULSION INTRAVENOUS at 18:09

## 2020-01-01 RX ADMIN — ALBUTEROL SULFATE 4 PUFF: 90 AEROSOL, METERED RESPIRATORY (INHALATION) at 04:43

## 2020-01-01 RX ADMIN — IPRATROPIUM BROMIDE 4 PUFF: 17 AEROSOL, METERED RESPIRATORY (INHALATION) at 20:02

## 2020-01-01 RX ADMIN — IPRATROPIUM BROMIDE 4 PUFF: 17 AEROSOL, METERED RESPIRATORY (INHALATION) at 16:05

## 2020-01-01 RX ADMIN — PROPOFOL 45 MCG/KG/MIN: 10 INJECTION, EMULSION INTRAVENOUS at 14:45

## 2020-01-01 RX ADMIN — SODIUM CHLORIDE, PRESERVATIVE FREE 10 ML: 5 INJECTION INTRAVENOUS at 20:55

## 2020-01-01 RX ADMIN — HEPARIN SODIUM: 5000 INJECTION, SOLUTION INTRAVENOUS; SUBCUTANEOUS at 07:25

## 2020-01-01 RX ADMIN — Medication 200 MCG/HR: at 22:06

## 2020-01-01 RX ADMIN — Medication 2000 ML/HR: at 00:20

## 2020-01-01 RX ADMIN — ENOXAPARIN SODIUM 40 MG: 100 INJECTION SUBCUTANEOUS at 08:19

## 2020-01-01 RX ADMIN — PROPOFOL 80 MCG/KG/MIN: 10 INJECTION, EMULSION INTRAVENOUS at 21:27

## 2020-01-01 RX ADMIN — CALCIUM GLUCONATE 1 G: 98 INJECTION, SOLUTION INTRAVENOUS at 23:04

## 2020-01-01 RX ADMIN — CEFEPIME 2 G: 2 INJECTION, POWDER, FOR SOLUTION INTRAVENOUS at 05:25

## 2020-01-01 RX ADMIN — SODIUM BICARBONATE: 84 INJECTION, SOLUTION INTRAVENOUS at 17:28

## 2020-01-01 RX ADMIN — SODIUM CHLORIDE, PRESERVATIVE FREE 10 ML: 5 INJECTION INTRAVENOUS at 20:41

## 2020-01-01 RX ADMIN — ATORVASTATIN CALCIUM 40 MG: 40 TABLET, FILM COATED ORAL at 08:42

## 2020-01-01 RX ADMIN — Medication 200 MCG/HR: at 06:37

## 2020-01-01 RX ADMIN — PROPOFOL 80 MCG/KG/MIN: 10 INJECTION, EMULSION INTRAVENOUS at 10:36

## 2020-01-01 RX ADMIN — ENOXAPARIN SODIUM 30 MG: 30 INJECTION SUBCUTANEOUS at 08:15

## 2020-01-01 RX ADMIN — FAMOTIDINE 20 MG: 10 INJECTION INTRAVENOUS at 08:44

## 2020-01-01 RX ADMIN — CALCIUM GLUCONATE 1 G: 98 INJECTION, SOLUTION INTRAVENOUS at 16:17

## 2020-01-01 RX ADMIN — MEROPENEM 1 G: 1 INJECTION, POWDER, FOR SOLUTION INTRAVENOUS at 05:19

## 2020-01-01 RX ADMIN — SODIUM CHLORIDE, PRESERVATIVE FREE 10 ML: 5 INJECTION INTRAVENOUS at 08:55

## 2020-01-01 RX ADMIN — CALCIUM GLUCONATE 1 G: 98 INJECTION, SOLUTION INTRAVENOUS at 00:46

## 2020-01-01 RX ADMIN — IPRATROPIUM BROMIDE 4 PUFF: 17 AEROSOL, METERED RESPIRATORY (INHALATION) at 01:24

## 2020-01-01 RX ADMIN — SODIUM CHLORIDE, PRESERVATIVE FREE 10 ML: 5 INJECTION INTRAVENOUS at 09:09

## 2020-01-01 RX ADMIN — IPRATROPIUM BROMIDE 4 PUFF: 17 AEROSOL, METERED RESPIRATORY (INHALATION) at 11:24

## 2020-01-01 RX ADMIN — VANCOMYCIN HYDROCHLORIDE 1500 MG: 5 INJECTION, POWDER, LYOPHILIZED, FOR SOLUTION INTRAVENOUS at 10:55

## 2020-01-01 RX ADMIN — Medication 200 MCG/HR: at 14:06

## 2020-01-01 RX ADMIN — MEROPENEM 1 G: 1 INJECTION, POWDER, FOR SOLUTION INTRAVENOUS at 13:35

## 2020-01-01 RX ADMIN — IPRATROPIUM BROMIDE 4 PUFF: 17 AEROSOL, METERED RESPIRATORY (INHALATION) at 15:11

## 2020-01-01 RX ADMIN — PROPOFOL 80 MCG/KG/MIN: 10 INJECTION, EMULSION INTRAVENOUS at 01:07

## 2020-01-01 RX ADMIN — PROPOFOL 80 MCG/KG/MIN: 10 INJECTION, EMULSION INTRAVENOUS at 15:45

## 2020-01-01 RX ADMIN — SODIUM CHLORIDE, PRESERVATIVE FREE 10 ML: 5 INJECTION INTRAVENOUS at 20:56

## 2020-01-01 RX ADMIN — IPRATROPIUM BROMIDE 4 PUFF: 17 AEROSOL, METERED RESPIRATORY (INHALATION) at 19:13

## 2020-01-01 RX ADMIN — PROPOFOL 80 MCG/KG/MIN: 10 INJECTION, EMULSION INTRAVENOUS at 10:45

## 2020-01-01 RX ADMIN — ALBUTEROL SULFATE 4 PUFF: 90 AEROSOL, METERED RESPIRATORY (INHALATION) at 15:56

## 2020-01-01 RX ADMIN — PROPOFOL 50 MCG/KG/MIN: 10 INJECTION, EMULSION INTRAVENOUS at 00:28

## 2020-01-01 RX ADMIN — Medication 1000 ML/HR: at 13:35

## 2020-01-01 RX ADMIN — FENTANYL CITRATE 25 MCG: 50 INJECTION INTRAMUSCULAR; INTRAVENOUS at 01:45

## 2020-01-01 RX ADMIN — CHLORHEXIDINE GLUCONATE 0.12% ORAL RINSE 15 ML: 1.2 LIQUID ORAL at 21:42

## 2020-01-01 RX ADMIN — Medication 1000 ML/HR: at 08:23

## 2020-01-01 RX ADMIN — PROPOFOL 50 MCG/KG/MIN: 10 INJECTION, EMULSION INTRAVENOUS at 18:32

## 2020-01-01 RX ADMIN — IPRATROPIUM BROMIDE 4 PUFF: 17 AEROSOL, METERED RESPIRATORY (INHALATION) at 22:47

## 2020-01-01 RX ADMIN — Medication 2000 ML/HR: at 01:15

## 2020-01-01 RX ADMIN — Medication 1000 ML/HR: at 18:07

## 2020-01-01 RX ADMIN — Medication 1000 ML/HR: at 20:56

## 2020-01-01 RX ADMIN — PROPOFOL 80 MCG/KG/MIN: 10 INJECTION, EMULSION INTRAVENOUS at 22:37

## 2020-01-01 RX ADMIN — Medication 1500 ML/HR: at 06:23

## 2020-01-01 RX ADMIN — PROPOFOL 80 MCG/KG/MIN: 10 INJECTION, EMULSION INTRAVENOUS at 10:05

## 2020-01-01 RX ADMIN — PROPOFOL 50 MCG/KG/MIN: 10 INJECTION, EMULSION INTRAVENOUS at 20:57

## 2020-01-01 RX ADMIN — IPRATROPIUM BROMIDE 4 PUFF: 17 AEROSOL, METERED RESPIRATORY (INHALATION) at 18:53

## 2020-01-01 RX ADMIN — Medication 200 MCG/HR: at 13:32

## 2020-01-01 RX ADMIN — METHYLPREDNISOLONE SODIUM SUCCINATE 40 MG: 40 INJECTION, POWDER, FOR SOLUTION INTRAMUSCULAR; INTRAVENOUS at 08:19

## 2020-01-01 RX ADMIN — SODIUM CHLORIDE, PRESERVATIVE FREE 10 ML: 5 INJECTION INTRAVENOUS at 08:20

## 2020-01-01 RX ADMIN — ALBUMIN (HUMAN) 12.5 G: 12.5 INJECTION, SOLUTION INTRAVENOUS at 15:27

## 2020-01-01 RX ADMIN — PROPOFOL 80 MCG/KG/MIN: 10 INJECTION, EMULSION INTRAVENOUS at 04:35

## 2020-01-01 RX ADMIN — ALBUTEROL SULFATE 4 PUFF: 90 AEROSOL, METERED RESPIRATORY (INHALATION) at 16:00

## 2020-01-01 RX ADMIN — HYDROCORTISONE SODIUM SUCCINATE 100 MG: 100 INJECTION, POWDER, FOR SOLUTION INTRAMUSCULAR; INTRAVENOUS at 06:26

## 2020-01-01 RX ADMIN — DEXAMETHASONE 6 MG: 4 TABLET ORAL at 09:49

## 2020-01-01 RX ADMIN — CALCIUM GLUCONATE 1 G: 98 INJECTION, SOLUTION INTRAVENOUS at 18:44

## 2020-01-01 RX ADMIN — VASOPRESSIN 0.04 UNITS/MIN: 20 INJECTION INTRAVENOUS at 17:15

## 2020-01-01 RX ADMIN — IPRATROPIUM BROMIDE 4 PUFF: 17 AEROSOL, METERED RESPIRATORY (INHALATION) at 15:03

## 2020-01-01 RX ADMIN — HEPARIN SODIUM 2000 UNITS: 1000 INJECTION INTRAVENOUS; SUBCUTANEOUS at 05:23

## 2020-01-01 RX ADMIN — CEFEPIME 2 G: 2 INJECTION, POWDER, FOR SOLUTION INTRAVENOUS at 20:54

## 2020-01-01 RX ADMIN — ALBUTEROL SULFATE 4 PUFF: 90 AEROSOL, METERED RESPIRATORY (INHALATION) at 07:43

## 2020-01-01 RX ADMIN — PROPOFOL 40 MCG/KG/MIN: 10 INJECTION, EMULSION INTRAVENOUS at 05:08

## 2020-01-01 RX ADMIN — ALBUMIN (HUMAN) 25 G: 0.25 INJECTION, SOLUTION INTRAVENOUS at 03:41

## 2020-01-01 RX ADMIN — CALCIUM GLUCONATE 1 G: 98 INJECTION, SOLUTION INTRAVENOUS at 02:37

## 2020-01-01 RX ADMIN — PROPOFOL 80 MCG/KG/MIN: 10 INJECTION, EMULSION INTRAVENOUS at 05:09

## 2020-01-01 RX ADMIN — Medication 30 MCG/MIN: at 00:35

## 2020-01-01 RX ADMIN — Medication 2000 UNITS: at 07:58

## 2020-01-01 RX ADMIN — ALBUTEROL SULFATE 4 PUFF: 90 AEROSOL, METERED RESPIRATORY (INHALATION) at 00:45

## 2020-01-01 RX ADMIN — Medication 250 ML/HR: at 19:51

## 2020-01-01 RX ADMIN — HYDROCORTISONE SODIUM SUCCINATE 100 MG: 100 INJECTION, POWDER, FOR SOLUTION INTRAMUSCULAR; INTRAVENOUS at 14:33

## 2020-01-01 RX ADMIN — PROPOFOL 80 MCG/KG/MIN: 10 INJECTION, EMULSION INTRAVENOUS at 03:25

## 2020-01-01 RX ADMIN — ALBUTEROL SULFATE 4 PUFF: 90 AEROSOL, METERED RESPIRATORY (INHALATION) at 07:41

## 2020-01-01 RX ADMIN — SODIUM CHLORIDE, PRESERVATIVE FREE 10 ML: 5 INJECTION INTRAVENOUS at 20:36

## 2020-01-01 RX ADMIN — Medication 200 MCG/HR: at 12:11

## 2020-01-01 RX ADMIN — Medication 200 MCG/HR: at 17:01

## 2020-01-01 RX ADMIN — IPRATROPIUM BROMIDE 4 PUFF: 17 AEROSOL, METERED RESPIRATORY (INHALATION) at 08:20

## 2020-01-01 RX ADMIN — CALCIUM GLUCONATE 1 G: 98 INJECTION, SOLUTION INTRAVENOUS at 10:05

## 2020-01-01 RX ADMIN — MEROPENEM 1 G: 1 INJECTION, POWDER, FOR SOLUTION INTRAVENOUS at 12:57

## 2020-01-01 RX ADMIN — CHLORHEXIDINE GLUCONATE 0.12% ORAL RINSE 15 ML: 1.2 LIQUID ORAL at 20:27

## 2020-01-01 RX ADMIN — HEPARIN SODIUM 2000 UNITS: 1000 INJECTION INTRAVENOUS; SUBCUTANEOUS at 10:16

## 2020-01-01 RX ADMIN — PROPOFOL 65 MCG/KG/MIN: 10 INJECTION, EMULSION INTRAVENOUS at 10:13

## 2020-01-01 RX ADMIN — CHLORHEXIDINE GLUCONATE 0.12% ORAL RINSE 15 ML: 1.2 LIQUID ORAL at 20:58

## 2020-01-01 RX ADMIN — CEFEPIME 2 G: 2 INJECTION, POWDER, FOR SOLUTION INTRAVENOUS at 21:42

## 2020-01-01 RX ADMIN — PROPOFOL 80 MCG/KG/MIN: 10 INJECTION, EMULSION INTRAVENOUS at 05:54

## 2020-01-01 RX ADMIN — FAMOTIDINE 10 MG: 10 INJECTION INTRAVENOUS at 08:39

## 2020-01-01 RX ADMIN — Medication 100 MCG/HR: at 12:39

## 2020-01-01 RX ADMIN — PROPOFOL 65 MCG/KG/MIN: 10 INJECTION, EMULSION INTRAVENOUS at 22:14

## 2020-01-01 RX ADMIN — IPRATROPIUM BROMIDE 4 PUFF: 17 AEROSOL, METERED RESPIRATORY (INHALATION) at 07:24

## 2020-01-01 RX ADMIN — Medication 3 MCG/MIN: at 13:52

## 2020-01-01 RX ADMIN — Medication 200 MCG/HR: at 10:23

## 2020-01-01 RX ADMIN — ALBUTEROL SULFATE 4 PUFF: 90 AEROSOL, METERED RESPIRATORY (INHALATION) at 07:31

## 2020-01-01 RX ADMIN — Medication 200 MCG/HR: at 03:45

## 2020-01-01 RX ADMIN — ALBUTEROL SULFATE 4 PUFF: 90 AEROSOL, METERED RESPIRATORY (INHALATION) at 10:55

## 2020-01-01 RX ADMIN — CHLORHEXIDINE GLUCONATE 0.12% ORAL RINSE 15 ML: 1.2 LIQUID ORAL at 20:55

## 2020-01-01 RX ADMIN — PROPOFOL 80 MCG/KG/MIN: 10 INJECTION, EMULSION INTRAVENOUS at 16:18

## 2020-01-01 RX ADMIN — VANCOMYCIN HYDROCHLORIDE 1500 MG: 5 INJECTION, POWDER, LYOPHILIZED, FOR SOLUTION INTRAVENOUS at 23:42

## 2020-01-01 RX ADMIN — PROPOFOL 80 MCG/KG/MIN: 10 INJECTION, EMULSION INTRAVENOUS at 03:14

## 2020-01-01 RX ADMIN — Medication 200 MCG/HR: at 00:21

## 2020-01-01 RX ADMIN — PROPOFOL 10 MCG/KG/MIN: 10 INJECTION, EMULSION INTRAVENOUS at 17:57

## 2020-01-01 RX ADMIN — HYDROCORTISONE SODIUM SUCCINATE 100 MG: 100 INJECTION, POWDER, FOR SOLUTION INTRAMUSCULAR; INTRAVENOUS at 05:30

## 2020-01-01 RX ADMIN — CHLORHEXIDINE GLUCONATE 0.12% ORAL RINSE 15 ML: 1.2 LIQUID ORAL at 09:12

## 2020-01-01 RX ADMIN — Medication 100 MEQ: at 10:40

## 2020-01-01 RX ADMIN — ATORVASTATIN CALCIUM 40 MG: 40 TABLET, FILM COATED ORAL at 08:19

## 2020-01-01 RX ADMIN — Medication 1200 ML/HR: at 21:42

## 2020-01-01 RX ADMIN — FAMOTIDINE 20 MG: 10 INJECTION INTRAVENOUS at 21:42

## 2020-01-01 RX ADMIN — ALBUMIN (HUMAN) 25 G: 0.25 INJECTION, SOLUTION INTRAVENOUS at 14:40

## 2020-01-01 RX ADMIN — PROPOFOL 80 MCG/KG/MIN: 10 INJECTION, EMULSION INTRAVENOUS at 16:44

## 2020-01-01 RX ADMIN — IPRATROPIUM BROMIDE 4 PUFF: 17 AEROSOL, METERED RESPIRATORY (INHALATION) at 00:45

## 2020-01-01 RX ADMIN — MAGNESIUM SULFATE HEPTAHYDRATE 2 G: 40 INJECTION, SOLUTION INTRAVENOUS at 21:13

## 2020-01-01 RX ADMIN — IPRATROPIUM BROMIDE 4 PUFF: 17 AEROSOL, METERED RESPIRATORY (INHALATION) at 00:11

## 2020-01-01 RX ADMIN — ATORVASTATIN CALCIUM 40 MG: 40 TABLET, FILM COATED ORAL at 08:56

## 2020-01-01 RX ADMIN — ATORVASTATIN CALCIUM 40 MG: 40 TABLET, FILM COATED ORAL at 09:13

## 2020-01-01 RX ADMIN — FAMOTIDINE 10 MG: 10 INJECTION INTRAVENOUS at 20:31

## 2020-01-01 RX ADMIN — VASOPRESSIN 0.04 UNITS/MIN: 20 INJECTION INTRAVENOUS at 06:35

## 2020-01-01 RX ADMIN — Medication 1000 ML/HR: at 01:21

## 2020-01-01 RX ADMIN — SODIUM CHLORIDE 2 MCG/KG/MIN: 900 INJECTION, SOLUTION INTRAVENOUS at 00:30

## 2020-01-01 RX ADMIN — CALCIUM GLUCONATE 1 G: 98 INJECTION, SOLUTION INTRAVENOUS at 23:57

## 2020-01-01 RX ADMIN — FAMOTIDINE 10 MG: 10 INJECTION INTRAVENOUS at 20:37

## 2020-01-01 RX ADMIN — Medication 1000 ML/HR: at 06:24

## 2020-01-01 RX ADMIN — Medication 1000 ML/HR: at 04:00

## 2020-01-01 RX ADMIN — IPRATROPIUM BROMIDE 4 PUFF: 17 AEROSOL, METERED RESPIRATORY (INHALATION) at 23:07

## 2020-01-01 RX ADMIN — SODIUM CHLORIDE, PRESERVATIVE FREE 10 ML: 5 INJECTION INTRAVENOUS at 05:30

## 2020-01-01 RX ADMIN — PROPOFOL 80 MCG/KG/MIN: 10 INJECTION, EMULSION INTRAVENOUS at 08:22

## 2020-01-01 RX ADMIN — ALBUTEROL SULFATE 4 PUFF: 90 AEROSOL, METERED RESPIRATORY (INHALATION) at 08:19

## 2020-01-01 RX ADMIN — PANTOPRAZOLE SODIUM 40 MG: 40 TABLET, DELAYED RELEASE ORAL at 05:06

## 2020-01-01 RX ADMIN — PROPOFOL 60 MCG/KG/MIN: 10 INJECTION, EMULSION INTRAVENOUS at 13:35

## 2020-01-01 RX ADMIN — ALBUTEROL SULFATE 4 PUFF: 90 AEROSOL, METERED RESPIRATORY (INHALATION) at 23:57

## 2020-01-01 RX ADMIN — ALTEPLASE 4 MG: 2.2 INJECTION, POWDER, LYOPHILIZED, FOR SOLUTION INTRAVENOUS at 11:38

## 2020-01-01 RX ADMIN — CHLORHEXIDINE GLUCONATE 0.12% ORAL RINSE 15 ML: 1.2 LIQUID ORAL at 20:36

## 2020-01-01 RX ADMIN — ALBUTEROL SULFATE 4 PUFF: 90 AEROSOL, METERED RESPIRATORY (INHALATION) at 07:40

## 2020-01-01 RX ADMIN — LIDOCAINE HYDROCHLORIDE ANHYDROUS 5 ML: 10 INJECTION, SOLUTION INFILTRATION at 15:59

## 2020-01-01 RX ADMIN — ALBUTEROL SULFATE 4 PUFF: 90 AEROSOL, METERED RESPIRATORY (INHALATION) at 19:13

## 2020-01-01 RX ADMIN — Medication 125 MCG/HR: at 18:09

## 2020-01-01 RX ADMIN — PROPOFOL 80 MCG/KG/MIN: 10 INJECTION, EMULSION INTRAVENOUS at 03:30

## 2020-01-01 RX ADMIN — Medication 200 MCG/HR: at 12:14

## 2020-01-01 RX ADMIN — PROPOFOL 80 MCG/KG/MIN: 10 INJECTION, EMULSION INTRAVENOUS at 06:36

## 2020-01-01 RX ADMIN — PROPOFOL 55 MCG/KG/MIN: 10 INJECTION, EMULSION INTRAVENOUS at 16:52

## 2020-01-01 RX ADMIN — Medication 1200 ML/HR: at 18:07

## 2020-01-01 RX ADMIN — SODIUM CHLORIDE 2.5 MCG/KG/MIN: 900 INJECTION, SOLUTION INTRAVENOUS at 11:50

## 2020-01-01 RX ADMIN — VANCOMYCIN HYDROCHLORIDE 1500 MG: 5 INJECTION, POWDER, LYOPHILIZED, FOR SOLUTION INTRAVENOUS at 12:21

## 2020-01-01 RX ADMIN — MEROPENEM 1 G: 1 INJECTION, POWDER, FOR SOLUTION INTRAVENOUS at 20:37

## 2020-01-01 RX ADMIN — Medication 2000 ML/HR: at 17:05

## 2020-01-01 RX ADMIN — FAMOTIDINE 20 MG: 10 INJECTION INTRAVENOUS at 20:03

## 2020-01-01 RX ADMIN — METHYLPREDNISOLONE SODIUM SUCCINATE 40 MG: 40 INJECTION, POWDER, FOR SOLUTION INTRAMUSCULAR; INTRAVENOUS at 07:59

## 2020-01-01 RX ADMIN — HYDROCORTISONE SODIUM SUCCINATE 100 MG: 100 INJECTION, POWDER, FOR SOLUTION INTRAMUSCULAR; INTRAVENOUS at 21:46

## 2020-01-01 RX ADMIN — PROPOFOL 55 MCG/KG/MIN: 10 INJECTION, EMULSION INTRAVENOUS at 15:59

## 2020-01-01 RX ADMIN — SODIUM CHLORIDE, PRESERVATIVE FREE 10 ML: 5 INJECTION INTRAVENOUS at 21:43

## 2020-01-01 RX ADMIN — Medication 200 MCG/HR: at 04:48

## 2020-01-01 RX ADMIN — IPRATROPIUM BROMIDE 4 PUFF: 17 AEROSOL, METERED RESPIRATORY (INHALATION) at 08:05

## 2020-01-01 RX ADMIN — IPRATROPIUM BROMIDE 4 PUFF: 17 AEROSOL, METERED RESPIRATORY (INHALATION) at 19:39

## 2020-01-01 RX ADMIN — PROPOFOL 65 MCG/KG/MIN: 10 INJECTION, EMULSION INTRAVENOUS at 21:39

## 2020-01-01 RX ADMIN — IPRATROPIUM BROMIDE 4 PUFF: 17 AEROSOL, METERED RESPIRATORY (INHALATION) at 03:10

## 2020-01-01 RX ADMIN — PROPOFOL 80 MCG/KG/MIN: 10 INJECTION, EMULSION INTRAVENOUS at 21:45

## 2020-01-01 RX ADMIN — PROPOFOL 80 MCG/KG/MIN: 10 INJECTION, EMULSION INTRAVENOUS at 11:13

## 2020-01-01 RX ADMIN — SODIUM CHLORIDE: 9 INJECTION, SOLUTION INTRAVENOUS at 06:01

## 2020-01-01 RX ADMIN — ALBUTEROL SULFATE 4 PUFF: 90 AEROSOL, METERED RESPIRATORY (INHALATION) at 04:38

## 2020-01-01 RX ADMIN — PROPOFOL 80 MCG/KG/MIN: 10 INJECTION, EMULSION INTRAVENOUS at 10:00

## 2020-01-01 RX ADMIN — Medication 2 MCG/MIN: at 19:47

## 2020-01-01 RX ADMIN — SODIUM CHLORIDE, PRESERVATIVE FREE 10 ML: 5 INJECTION INTRAVENOUS at 09:25

## 2020-01-01 RX ADMIN — CHLORHEXIDINE GLUCONATE 0.12% ORAL RINSE 15 ML: 1.2 LIQUID ORAL at 20:03

## 2020-01-01 RX ADMIN — FAMOTIDINE 20 MG: 10 INJECTION INTRAVENOUS at 11:04

## 2020-01-01 RX ADMIN — CALCIUM GLUCONATE 1 G: 98 INJECTION, SOLUTION INTRAVENOUS at 09:13

## 2020-01-01 RX ADMIN — Medication 2000 ML/HR: at 21:06

## 2020-01-01 RX ADMIN — Medication 1500 ML/HR: at 19:51

## 2020-01-01 RX ADMIN — SODIUM CHLORIDE, PRESERVATIVE FREE 10 ML: 5 INJECTION INTRAVENOUS at 08:29

## 2020-01-01 RX ADMIN — CEFEPIME 2 G: 2 INJECTION, POWDER, FOR SOLUTION INTRAVENOUS at 05:30

## 2020-01-01 RX ADMIN — PROPOFOL 50 MCG/KG/MIN: 10 INJECTION, EMULSION INTRAVENOUS at 10:48

## 2020-01-01 RX ADMIN — Medication 200 MCG/HR: at 22:37

## 2020-01-01 RX ADMIN — VASOPRESSIN 0.04 UNITS/MIN: 20 INJECTION INTRAVENOUS at 10:32

## 2020-01-01 RX ADMIN — METHYLPREDNISOLONE SODIUM SUCCINATE 40 MG: 40 INJECTION, POWDER, FOR SOLUTION INTRAMUSCULAR; INTRAVENOUS at 11:36

## 2020-01-01 RX ADMIN — CALCIUM GLUCONATE 1 G: 98 INJECTION, SOLUTION INTRAVENOUS at 20:42

## 2020-01-01 RX ADMIN — PROPOFOL 65 MCG/KG/MIN: 10 INJECTION, EMULSION INTRAVENOUS at 12:17

## 2020-01-01 RX ADMIN — PROPOFOL 80 MCG/KG/MIN: 10 INJECTION, EMULSION INTRAVENOUS at 01:10

## 2020-01-01 RX ADMIN — HYDROCORTISONE SODIUM SUCCINATE 100 MG: 100 INJECTION, POWDER, FOR SOLUTION INTRAMUSCULAR; INTRAVENOUS at 05:12

## 2020-01-01 RX ADMIN — Medication 8 MCG/MIN: at 21:45

## 2020-01-01 RX ADMIN — PROPOFOL 120 MG: 10 INJECTION, EMULSION INTRAVENOUS at 17:47

## 2020-01-01 RX ADMIN — VASOPRESSIN 0.04 UNITS/MIN: 20 INJECTION INTRAVENOUS at 02:36

## 2020-01-01 RX ADMIN — MEROPENEM 1 G: 1 INJECTION, POWDER, FOR SOLUTION INTRAVENOUS at 18:32

## 2020-01-01 RX ADMIN — VASOPRESSIN 0.04 UNITS/MIN: 20 INJECTION INTRAVENOUS at 23:26

## 2020-01-01 RX ADMIN — HEPARIN SODIUM: 5000 INJECTION, SOLUTION INTRAVENOUS; SUBCUTANEOUS at 21:00

## 2020-01-01 RX ADMIN — Medication 1000 ML/HR: at 20:15

## 2020-01-01 RX ADMIN — CALCIUM GLUCONATE 1 G: 98 INJECTION, SOLUTION INTRAVENOUS at 02:28

## 2020-01-01 RX ADMIN — VASOPRESSIN 0.04 UNITS/MIN: 20 INJECTION INTRAVENOUS at 09:19

## 2020-01-01 RX ADMIN — PROPOFOL 80 MCG/KG/MIN: 10 INJECTION, EMULSION INTRAVENOUS at 13:02

## 2020-01-01 RX ADMIN — Medication 1500 ML/HR: at 09:47

## 2020-01-01 RX ADMIN — ALBUTEROL SULFATE 4 PUFF: 90 AEROSOL, METERED RESPIRATORY (INHALATION) at 20:00

## 2020-01-01 RX ADMIN — PROPOFOL 80 MCG/KG/MIN: 10 INJECTION, EMULSION INTRAVENOUS at 18:17

## 2020-01-01 RX ADMIN — PROPOFOL 80 MCG/KG/MIN: 10 INJECTION, EMULSION INTRAVENOUS at 22:55

## 2020-01-01 RX ADMIN — SODIUM CHLORIDE, PRESERVATIVE FREE 10 ML: 5 INJECTION INTRAVENOUS at 20:39

## 2020-01-01 RX ADMIN — ALBUTEROL SULFATE 4 PUFF: 90 AEROSOL, METERED RESPIRATORY (INHALATION) at 01:23

## 2020-01-01 RX ADMIN — CEFEPIME 2 G: 2 INJECTION, POWDER, FOR SOLUTION INTRAVENOUS at 05:01

## 2020-01-01 RX ADMIN — Medication 1200 ML/HR: at 14:35

## 2020-01-01 RX ADMIN — SODIUM CHLORIDE, PRESERVATIVE FREE 10 ML: 5 INJECTION INTRAVENOUS at 21:46

## 2020-01-01 RX ADMIN — PROPOFOL 80 MCG/KG/MIN: 10 INJECTION, EMULSION INTRAVENOUS at 04:12

## 2020-01-01 RX ADMIN — BUMETANIDE 2 MG: 0.25 INJECTION INTRAMUSCULAR; INTRAVENOUS at 18:09

## 2020-01-01 RX ADMIN — BUMETANIDE 2 MG: 0.25 INJECTION INTRAMUSCULAR; INTRAVENOUS at 11:42

## 2020-01-01 RX ADMIN — HYDROCORTISONE SODIUM SUCCINATE 100 MG: 100 INJECTION, POWDER, FOR SOLUTION INTRAMUSCULAR; INTRAVENOUS at 13:35

## 2020-01-01 RX ADMIN — VASOPRESSIN 0.04 UNITS/MIN: 20 INJECTION INTRAVENOUS at 16:43

## 2020-01-01 RX ADMIN — ALBUTEROL SULFATE 4 PUFF: 90 AEROSOL, METERED RESPIRATORY (INHALATION) at 15:50

## 2020-01-01 RX ADMIN — ATORVASTATIN CALCIUM 40 MG: 40 TABLET, FILM COATED ORAL at 09:07

## 2020-01-01 RX ADMIN — ALBUTEROL SULFATE 4 PUFF: 90 AEROSOL, METERED RESPIRATORY (INHALATION) at 19:44

## 2020-01-01 RX ADMIN — METHYLPREDNISOLONE SODIUM SUCCINATE 40 MG: 40 INJECTION, POWDER, FOR SOLUTION INTRAMUSCULAR; INTRAVENOUS at 09:13

## 2020-01-01 RX ADMIN — WHITE PETROLATUM MINERAL OIL: 150; 830 OINTMENT OPHTHALMIC at 09:09

## 2020-01-01 RX ADMIN — IPRATROPIUM BROMIDE 4 PUFF: 17 AEROSOL, METERED RESPIRATORY (INHALATION) at 20:56

## 2020-01-01 RX ADMIN — FAMOTIDINE 10 MG: 10 INJECTION INTRAVENOUS at 09:08

## 2020-01-01 RX ADMIN — ALBUTEROL SULFATE 4 PUFF: 90 AEROSOL, METERED RESPIRATORY (INHALATION) at 11:19

## 2020-01-01 RX ADMIN — CHLORHEXIDINE GLUCONATE 0.12% ORAL RINSE 15 ML: 1.2 LIQUID ORAL at 21:27

## 2020-01-01 RX ADMIN — Medication 2000 ML/HR: at 22:20

## 2020-01-01 RX ADMIN — MEROPENEM 1 G: 1 INJECTION, POWDER, FOR SOLUTION INTRAVENOUS at 20:55

## 2020-01-01 RX ADMIN — IPRATROPIUM BROMIDE 4 PUFF: 17 AEROSOL, METERED RESPIRATORY (INHALATION) at 00:47

## 2020-01-01 RX ADMIN — IPRATROPIUM BROMIDE 4 PUFF: 17 AEROSOL, METERED RESPIRATORY (INHALATION) at 15:01

## 2020-01-01 RX ADMIN — PROPOFOL 60 MCG/KG/MIN: 10 INJECTION, EMULSION INTRAVENOUS at 07:56

## 2020-01-01 RX ADMIN — SODIUM CHLORIDE, PRESERVATIVE FREE 10 ML: 5 INJECTION INTRAVENOUS at 09:08

## 2020-01-01 RX ADMIN — Medication 25 MCG/HR: at 12:51

## 2020-01-01 RX ADMIN — PROPOFOL 80 MCG/KG/MIN: 10 INJECTION, EMULSION INTRAVENOUS at 16:30

## 2020-01-01 RX ADMIN — PROPOFOL 80 MCG/KG/MIN: 10 INJECTION, EMULSION INTRAVENOUS at 08:48

## 2020-01-01 RX ADMIN — MEROPENEM 1 G: 1 INJECTION, POWDER, FOR SOLUTION INTRAVENOUS at 21:56

## 2020-01-01 RX ADMIN — IPRATROPIUM BROMIDE 4 PUFF: 17 AEROSOL, METERED RESPIRATORY (INHALATION) at 07:13

## 2020-01-01 RX ADMIN — Medication 1000 ML/HR: at 17:16

## 2020-01-01 RX ADMIN — ALBUTEROL SULFATE 4 PUFF: 90 AEROSOL, METERED RESPIRATORY (INHALATION) at 19:47

## 2020-01-01 RX ADMIN — ALBUTEROL SULFATE 4 PUFF: 90 AEROSOL, METERED RESPIRATORY (INHALATION) at 07:11

## 2020-01-01 RX ADMIN — VANCOMYCIN HYDROCHLORIDE 1500 MG: 5 INJECTION, POWDER, LYOPHILIZED, FOR SOLUTION INTRAVENOUS at 00:46

## 2020-01-01 RX ADMIN — ALBUTEROL SULFATE 4 PUFF: 90 AEROSOL, METERED RESPIRATORY (INHALATION) at 19:02

## 2020-01-01 RX ADMIN — Medication 25 MCG/HR: at 17:56

## 2020-01-01 RX ADMIN — IPRATROPIUM BROMIDE 4 PUFF: 17 AEROSOL, METERED RESPIRATORY (INHALATION) at 07:31

## 2020-01-01 RX ADMIN — IPRATROPIUM BROMIDE 4 PUFF: 17 AEROSOL, METERED RESPIRATORY (INHALATION) at 23:17

## 2020-01-01 RX ADMIN — ALBUTEROL SULFATE 4 PUFF: 90 AEROSOL, METERED RESPIRATORY (INHALATION) at 03:10

## 2020-01-01 RX ADMIN — IPRATROPIUM BROMIDE 4 PUFF: 17 AEROSOL, METERED RESPIRATORY (INHALATION) at 07:41

## 2020-01-01 RX ADMIN — Medication 75 MCG/HR: at 02:25

## 2020-01-01 RX ADMIN — ACETAMINOPHEN 1000 MG: 500 TABLET ORAL at 20:30

## 2020-01-01 RX ADMIN — CHLORHEXIDINE GLUCONATE 0.12% ORAL RINSE 15 ML: 1.2 LIQUID ORAL at 20:56

## 2020-01-01 RX ADMIN — VASOPRESSIN 0.04 UNITS/MIN: 20 INJECTION INTRAVENOUS at 08:06

## 2020-01-01 RX ADMIN — IPRATROPIUM BROMIDE 4 PUFF: 17 AEROSOL, METERED RESPIRATORY (INHALATION) at 04:38

## 2020-01-01 RX ADMIN — SODIUM CHLORIDE, PRESERVATIVE FREE 10 ML: 5 INJECTION INTRAVENOUS at 08:39

## 2020-01-01 RX ADMIN — MEROPENEM 1 G: 1 INJECTION, POWDER, FOR SOLUTION INTRAVENOUS at 21:45

## 2020-01-01 RX ADMIN — SODIUM CHLORIDE 3 MCG/KG/MIN: 900 INJECTION, SOLUTION INTRAVENOUS at 02:18

## 2020-01-01 RX ADMIN — HYDROCORTISONE SODIUM SUCCINATE 100 MG: 100 INJECTION, POWDER, FOR SOLUTION INTRAMUSCULAR; INTRAVENOUS at 05:39

## 2020-01-01 RX ADMIN — MEROPENEM 1 G: 1 INJECTION, POWDER, FOR SOLUTION INTRAVENOUS at 10:28

## 2020-01-01 RX ADMIN — ALBUTEROL SULFATE 4 PUFF: 90 AEROSOL, METERED RESPIRATORY (INHALATION) at 17:05

## 2020-01-01 RX ADMIN — PROPOFOL 80 MCG/KG/MIN: 10 INJECTION, EMULSION INTRAVENOUS at 19:31

## 2020-01-01 RX ADMIN — CHLORHEXIDINE GLUCONATE 0.12% ORAL RINSE 15 ML: 1.2 LIQUID ORAL at 20:31

## 2020-01-01 RX ADMIN — Medication 1200 ML/HR: at 19:34

## 2020-01-01 RX ADMIN — PROPOFOL 80 MCG/KG/MIN: 10 INJECTION, EMULSION INTRAVENOUS at 12:41

## 2020-01-01 RX ADMIN — SODIUM CHLORIDE, PRESERVATIVE FREE 10 ML: 5 INJECTION INTRAVENOUS at 09:15

## 2020-01-01 RX ADMIN — PROPOFOL 80 MCG/KG/MIN: 10 INJECTION, EMULSION INTRAVENOUS at 01:25

## 2020-01-01 RX ADMIN — Medication 200 MCG/HR: at 14:32

## 2020-01-01 RX ADMIN — CEFEPIME 2 G: 2 INJECTION, POWDER, FOR SOLUTION INTRAVENOUS at 16:01

## 2020-01-01 RX ADMIN — CALCIUM GLUCONATE 1 G: 98 INJECTION, SOLUTION INTRAVENOUS at 10:46

## 2020-01-01 RX ADMIN — ALBUTEROL SULFATE 4 PUFF: 90 AEROSOL, METERED RESPIRATORY (INHALATION) at 15:11

## 2020-01-01 RX ADMIN — METHYLPREDNISOLONE SODIUM SUCCINATE 40 MG: 40 INJECTION, POWDER, FOR SOLUTION INTRAMUSCULAR; INTRAVENOUS at 10:03

## 2020-01-01 RX ADMIN — CHLORHEXIDINE GLUCONATE 0.12% ORAL RINSE 15 ML: 1.2 LIQUID ORAL at 08:44

## 2020-01-01 RX ADMIN — PROPOFOL 45 MCG/KG/MIN: 10 INJECTION, EMULSION INTRAVENOUS at 14:15

## 2020-01-01 RX ADMIN — IPRATROPIUM BROMIDE 4 PUFF: 17 AEROSOL, METERED RESPIRATORY (INHALATION) at 23:04

## 2020-01-01 RX ADMIN — CHLORHEXIDINE GLUCONATE 0.12% ORAL RINSE 15 ML: 1.2 LIQUID ORAL at 08:52

## 2020-01-01 RX ADMIN — CHLORHEXIDINE GLUCONATE 0.12% ORAL RINSE 15 ML: 1.2 LIQUID ORAL at 08:19

## 2020-01-01 RX ADMIN — FAMOTIDINE 10 MG: 10 INJECTION INTRAVENOUS at 21:57

## 2020-01-01 RX ADMIN — IPRATROPIUM BROMIDE 4 PUFF: 17 AEROSOL, METERED RESPIRATORY (INHALATION) at 12:16

## 2020-01-01 RX ADMIN — CEFEPIME 2 G: 2 INJECTION, POWDER, FOR SOLUTION INTRAVENOUS at 16:34

## 2020-01-01 RX ADMIN — SODIUM CHLORIDE: 9 INJECTION, SOLUTION INTRAVENOUS at 16:26

## 2020-01-01 RX ADMIN — IPRATROPIUM BROMIDE 4 PUFF: 17 AEROSOL, METERED RESPIRATORY (INHALATION) at 11:25

## 2020-01-01 RX ADMIN — ENOXAPARIN SODIUM 40 MG: 100 INJECTION SUBCUTANEOUS at 08:57

## 2020-01-01 RX ADMIN — Medication 250 ML/HR: at 17:05

## 2020-01-01 RX ADMIN — VANCOMYCIN HYDROCHLORIDE 2000 MG: 1 INJECTION, POWDER, LYOPHILIZED, FOR SOLUTION INTRAVENOUS at 06:45

## 2020-01-01 RX ADMIN — MEROPENEM 1 G: 1 INJECTION, POWDER, FOR SOLUTION INTRAVENOUS at 04:38

## 2020-01-01 RX ADMIN — PROPOFOL 60 MCG/KG/MIN: 10 INJECTION, EMULSION INTRAVENOUS at 06:16

## 2020-01-01 RX ADMIN — IPRATROPIUM BROMIDE 4 PUFF: 17 AEROSOL, METERED RESPIRATORY (INHALATION) at 08:45

## 2020-01-01 RX ADMIN — BUMETANIDE 2 MG: 0.25 INJECTION INTRAMUSCULAR; INTRAVENOUS at 05:57

## 2020-01-01 RX ADMIN — ATORVASTATIN CALCIUM 40 MG: 40 TABLET, FILM COATED ORAL at 13:01

## 2020-01-01 RX ADMIN — Medication 1000 ML/HR: at 12:21

## 2020-01-01 RX ADMIN — METHYLPREDNISOLONE SODIUM SUCCINATE 40 MG: 40 INJECTION, POWDER, FOR SOLUTION INTRAMUSCULAR; INTRAVENOUS at 11:05

## 2020-01-01 RX ADMIN — ALBUTEROL SULFATE 4 PUFF: 90 AEROSOL, METERED RESPIRATORY (INHALATION) at 08:44

## 2020-01-01 RX ADMIN — FAMOTIDINE 10 MG: 10 INJECTION INTRAVENOUS at 08:14

## 2020-01-01 RX ADMIN — PROPOFOL 50 MCG/KG/MIN: 10 INJECTION, EMULSION INTRAVENOUS at 17:50

## 2020-01-01 RX ADMIN — MEROPENEM 1 G: 1 INJECTION, POWDER, FOR SOLUTION INTRAVENOUS at 00:35

## 2020-01-01 RX ADMIN — ENOXAPARIN SODIUM 40 MG: 100 INJECTION SUBCUTANEOUS at 11:37

## 2020-01-01 RX ADMIN — MEROPENEM 1 G: 1 INJECTION, POWDER, FOR SOLUTION INTRAVENOUS at 13:53

## 2020-01-01 RX ADMIN — POTASSIUM CHLORIDE 20 MEQ: 29.8 INJECTION, SOLUTION INTRAVENOUS at 15:32

## 2020-01-01 RX ADMIN — PROPOFOL 80 MCG/KG/MIN: 10 INJECTION, EMULSION INTRAVENOUS at 07:38

## 2020-01-01 RX ADMIN — IPRATROPIUM BROMIDE 4 PUFF: 17 AEROSOL, METERED RESPIRATORY (INHALATION) at 19:02

## 2020-01-01 RX ADMIN — ALBUTEROL SULFATE 4 PUFF: 90 AEROSOL, METERED RESPIRATORY (INHALATION) at 11:59

## 2020-01-01 RX ADMIN — PROPOFOL 80 MCG/KG/MIN: 10 INJECTION, EMULSION INTRAVENOUS at 18:42

## 2020-01-01 RX ADMIN — Medication 1000 ML/HR: at 12:40

## 2020-01-01 RX ADMIN — SODIUM CHLORIDE, PRESERVATIVE FREE 10 ML: 5 INJECTION INTRAVENOUS at 20:28

## 2020-01-01 RX ADMIN — ALBUTEROL SULFATE 4 PUFF: 90 AEROSOL, METERED RESPIRATORY (INHALATION) at 11:18

## 2020-01-01 RX ADMIN — PROPOFOL 40 MCG/KG/MIN: 10 INJECTION, EMULSION INTRAVENOUS at 12:38

## 2020-01-01 RX ADMIN — ALBUTEROL SULFATE 4 PUFF: 90 AEROSOL, METERED RESPIRATORY (INHALATION) at 08:05

## 2020-01-01 RX ADMIN — CEFEPIME 2 G: 2 INJECTION, POWDER, FOR SOLUTION INTRAVENOUS at 16:02

## 2020-01-01 RX ADMIN — PANTOPRAZOLE SODIUM 40 MG: 40 TABLET, DELAYED RELEASE ORAL at 05:25

## 2020-01-01 RX ADMIN — PROPOFOL 50 MCG/KG/MIN: 10 INJECTION, EMULSION INTRAVENOUS at 03:18

## 2020-01-01 RX ADMIN — MEROPENEM 1 G: 1 INJECTION, POWDER, FOR SOLUTION INTRAVENOUS at 01:41

## 2020-01-01 RX ADMIN — VASOPRESSIN 0.04 UNITS/MIN: 20 INJECTION INTRAVENOUS at 11:40

## 2020-01-01 RX ADMIN — ALBUTEROL SULFATE 4 PUFF: 90 AEROSOL, METERED RESPIRATORY (INHALATION) at 20:01

## 2020-01-01 RX ADMIN — Medication 250 ML/HR: at 15:20

## 2020-01-01 RX ADMIN — VASOPRESSIN 0.04 UNITS/MIN: 20 INJECTION INTRAVENOUS at 00:35

## 2020-01-01 RX ADMIN — PROPOFOL 80 MCG/KG/MIN: 10 INJECTION, EMULSION INTRAVENOUS at 08:06

## 2020-01-01 RX ADMIN — METHYLPREDNISOLONE SODIUM SUCCINATE 40 MG: 40 INJECTION, POWDER, FOR SOLUTION INTRAMUSCULAR; INTRAVENOUS at 08:44

## 2020-01-01 RX ADMIN — Medication 75 MCG/HR: at 15:38

## 2020-01-01 RX ADMIN — CEFEPIME 2 G: 2 INJECTION, POWDER, FOR SOLUTION INTRAVENOUS at 05:29

## 2020-01-01 RX ADMIN — IPRATROPIUM BROMIDE 4 PUFF: 17 AEROSOL, METERED RESPIRATORY (INHALATION) at 10:56

## 2020-01-01 RX ADMIN — HEPARIN SODIUM 2000 UNITS: 1000 INJECTION INTRAVENOUS; SUBCUTANEOUS at 01:35

## 2020-01-01 RX ADMIN — PROPOFOL 80 MCG/KG/MIN: 10 INJECTION, EMULSION INTRAVENOUS at 12:04

## 2020-01-01 RX ADMIN — Medication 200 MCG/HR: at 01:43

## 2020-01-01 RX ADMIN — CALCIUM GLUCONATE 1 G: 98 INJECTION, SOLUTION INTRAVENOUS at 09:41

## 2020-01-01 RX ADMIN — FAMOTIDINE 10 MG: 10 INJECTION INTRAVENOUS at 21:45

## 2020-01-01 RX ADMIN — MEROPENEM 1 G: 1 INJECTION, POWDER, FOR SOLUTION INTRAVENOUS at 20:03

## 2020-01-01 RX ADMIN — HEPARIN SODIUM: 5000 INJECTION, SOLUTION INTRAVENOUS; SUBCUTANEOUS at 10:13

## 2020-01-01 RX ADMIN — MEROPENEM 1 G: 1 INJECTION, POWDER, FOR SOLUTION INTRAVENOUS at 18:44

## 2020-01-01 RX ADMIN — PROPOFOL 65 MCG/KG/MIN: 10 INJECTION, EMULSION INTRAVENOUS at 23:56

## 2020-01-01 RX ADMIN — PROPOFOL 80 MCG/KG/MIN: 10 INJECTION, EMULSION INTRAVENOUS at 23:19

## 2020-01-01 RX ADMIN — Medication 2000 ML/HR: at 06:36

## 2020-01-01 RX ADMIN — CHLORHEXIDINE GLUCONATE 0.12% ORAL RINSE 15 ML: 1.2 LIQUID ORAL at 08:39

## 2020-01-01 RX ADMIN — CALCIUM GLUCONATE 1 G: 98 INJECTION, SOLUTION INTRAVENOUS at 04:19

## 2020-01-01 RX ADMIN — Medication 1200 ML/HR: at 03:30

## 2020-01-01 RX ADMIN — VASOPRESSIN 0.04 UNITS/MIN: 20 INJECTION INTRAVENOUS at 20:30

## 2020-01-01 RX ADMIN — IPRATROPIUM BROMIDE 4 PUFF: 17 AEROSOL, METERED RESPIRATORY (INHALATION) at 16:20

## 2020-01-01 RX ADMIN — IPRATROPIUM BROMIDE 4 PUFF: 17 AEROSOL, METERED RESPIRATORY (INHALATION) at 19:48

## 2020-01-01 RX ADMIN — ALBUTEROL SULFATE 4 PUFF: 90 AEROSOL, METERED RESPIRATORY (INHALATION) at 03:06

## 2020-01-01 RX ADMIN — SODIUM CHLORIDE 2.5 MCG/KG/MIN: 900 INJECTION, SOLUTION INTRAVENOUS at 07:43

## 2020-01-01 RX ADMIN — Medication 1000 ML/HR: at 11:59

## 2020-01-01 RX ADMIN — FAMOTIDINE 20 MG: 10 INJECTION INTRAVENOUS at 09:14

## 2020-01-01 RX ADMIN — HYDROCORTISONE SODIUM SUCCINATE 100 MG: 100 INJECTION, POWDER, FOR SOLUTION INTRAMUSCULAR; INTRAVENOUS at 20:31

## 2020-01-01 RX ADMIN — Medication 200 MCG/HR: at 16:14

## 2020-01-01 RX ADMIN — Medication 1000 ML/HR: at 17:11

## 2020-01-01 RX ADMIN — REMDESIVIR 100 MG: 100 INJECTION, POWDER, LYOPHILIZED, FOR SOLUTION INTRAVENOUS at 15:58

## 2020-01-01 RX ADMIN — CHLORHEXIDINE GLUCONATE 0.12% ORAL RINSE 15 ML: 1.2 LIQUID ORAL at 21:56

## 2020-01-01 RX ADMIN — PROPOFOL 40 MCG/KG/MIN: 10 INJECTION, EMULSION INTRAVENOUS at 00:33

## 2020-01-01 RX ADMIN — IPRATROPIUM BROMIDE 4 PUFF: 17 AEROSOL, METERED RESPIRATORY (INHALATION) at 11:53

## 2020-01-01 RX ADMIN — CEFEPIME 2 G: 2 INJECTION, POWDER, FOR SOLUTION INTRAVENOUS at 20:36

## 2020-01-01 RX ADMIN — Medication 200 MCG/HR: at 17:47

## 2020-01-01 RX ADMIN — Medication 200 MCG/HR: at 14:44

## 2020-01-01 RX ADMIN — DEXAMETHASONE 6 MG: 4 TABLET ORAL at 08:57

## 2020-01-01 RX ADMIN — IPRATROPIUM BROMIDE 4 PUFF: 17 AEROSOL, METERED RESPIRATORY (INHALATION) at 04:44

## 2020-01-01 RX ADMIN — IPRATROPIUM BROMIDE 4 PUFF: 17 AEROSOL, METERED RESPIRATORY (INHALATION) at 15:52

## 2020-01-01 RX ADMIN — ALBUTEROL SULFATE 4 PUFF: 90 AEROSOL, METERED RESPIRATORY (INHALATION) at 18:52

## 2020-01-01 RX ADMIN — ALBUTEROL SULFATE 4 PUFF: 90 AEROSOL, METERED RESPIRATORY (INHALATION) at 07:23

## 2020-01-01 RX ADMIN — Medication 1000 ML/HR: at 19:51

## 2020-01-01 RX ADMIN — Medication 200 MCG/HR: at 08:40

## 2020-01-01 RX ADMIN — POTASSIUM CHLORIDE 40 MEQ: 1500 TABLET, EXTENDED RELEASE ORAL at 21:13

## 2020-01-01 RX ADMIN — IPRATROPIUM BROMIDE 4 PUFF: 17 AEROSOL, METERED RESPIRATORY (INHALATION) at 15:55

## 2020-01-01 RX ADMIN — ENOXAPARIN SODIUM 40 MG: 100 INJECTION SUBCUTANEOUS at 09:24

## 2020-01-01 RX ADMIN — MEROPENEM 1 G: 1 INJECTION, POWDER, FOR SOLUTION INTRAVENOUS at 04:30

## 2020-01-01 RX ADMIN — VANCOMYCIN HYDROCHLORIDE 1500 MG: 5 INJECTION, POWDER, LYOPHILIZED, FOR SOLUTION INTRAVENOUS at 18:22

## 2020-01-01 RX ADMIN — Medication: at 02:38

## 2020-01-01 RX ADMIN — CHLORHEXIDINE GLUCONATE 0.12% ORAL RINSE 15 ML: 1.2 LIQUID ORAL at 11:03

## 2020-01-01 RX ADMIN — SODIUM CHLORIDE, PRESERVATIVE FREE 10 ML: 5 INJECTION INTRAVENOUS at 20:52

## 2020-01-01 RX ADMIN — Medication 2000 ML/HR: at 21:42

## 2020-01-01 RX ADMIN — SODIUM CHLORIDE: 9 INJECTION, SOLUTION INTRAVENOUS at 12:58

## 2020-01-01 RX ADMIN — PROPOFOL 80 MCG/KG/MIN: 10 INJECTION, EMULSION INTRAVENOUS at 23:13

## 2020-01-01 RX ADMIN — VASOPRESSIN 0.04 UNITS/MIN: 20 INJECTION INTRAVENOUS at 03:00

## 2020-01-01 RX ADMIN — PROPOFOL 35 MCG/KG/MIN: 10 INJECTION, EMULSION INTRAVENOUS at 20:58

## 2020-01-01 RX ADMIN — BUMETANIDE 2 MG: 0.25 INJECTION INTRAMUSCULAR; INTRAVENOUS at 13:54

## 2020-01-01 RX ADMIN — PROPOFOL 30 MCG/KG/MIN: 10 INJECTION, EMULSION INTRAVENOUS at 05:03

## 2020-01-01 RX ADMIN — FAMOTIDINE 20 MG: 10 INJECTION INTRAVENOUS at 20:55

## 2020-01-01 RX ADMIN — Medication 1500 ML/HR: at 14:13

## 2020-01-01 RX ADMIN — SODIUM CHLORIDE, PRESERVATIVE FREE 10 ML: 5 INJECTION INTRAVENOUS at 20:27

## 2020-01-01 RX ADMIN — ALBUTEROL SULFATE 4 PUFF: 90 AEROSOL, METERED RESPIRATORY (INHALATION) at 20:18

## 2020-01-01 RX ADMIN — PROPOFOL 45 MCG/KG/MIN: 10 INJECTION, EMULSION INTRAVENOUS at 14:43

## 2020-01-01 RX ADMIN — Medication 100 MG: at 17:47

## 2020-01-01 RX ADMIN — Medication 2000 ML/HR: at 19:45

## 2020-01-01 RX ADMIN — HYDROXYZINE HYDROCHLORIDE 50 MG: 50 INJECTION, SOLUTION INTRAMUSCULAR at 15:09

## 2020-01-01 RX ADMIN — ATORVASTATIN CALCIUM 40 MG: 40 TABLET, FILM COATED ORAL at 07:58

## 2020-01-01 RX ADMIN — ATORVASTATIN CALCIUM 40 MG: 40 TABLET, FILM COATED ORAL at 09:49

## 2020-01-01 RX ADMIN — IPRATROPIUM BROMIDE 4 PUFF: 17 AEROSOL, METERED RESPIRATORY (INHALATION) at 07:40

## 2020-01-01 RX ADMIN — ATORVASTATIN CALCIUM 40 MG: 40 TABLET, FILM COATED ORAL at 08:14

## 2020-01-01 RX ADMIN — IPRATROPIUM BROMIDE 4 PUFF: 17 AEROSOL, METERED RESPIRATORY (INHALATION) at 20:28

## 2020-01-01 RX ADMIN — ALBUTEROL SULFATE 4 PUFF: 90 AEROSOL, METERED RESPIRATORY (INHALATION) at 23:07

## 2020-01-01 RX ADMIN — FAMOTIDINE 20 MG: 10 INJECTION INTRAVENOUS at 07:57

## 2020-01-01 RX ADMIN — MEROPENEM 1 G: 1 INJECTION, POWDER, FOR SOLUTION INTRAVENOUS at 14:57

## 2020-01-01 RX ADMIN — IPRATROPIUM BROMIDE 4 PUFF: 17 AEROSOL, METERED RESPIRATORY (INHALATION) at 15:56

## 2020-01-01 RX ADMIN — ATORVASTATIN CALCIUM 40 MG: 40 TABLET, FILM COATED ORAL at 07:57

## 2020-01-01 RX ADMIN — PROPOFOL 80 MCG/KG/MIN: 10 INJECTION, EMULSION INTRAVENOUS at 21:00

## 2020-01-01 RX ADMIN — HYDROCORTISONE SODIUM SUCCINATE 100 MG: 100 INJECTION, POWDER, FOR SOLUTION INTRAMUSCULAR; INTRAVENOUS at 14:13

## 2020-01-01 RX ADMIN — Medication 1000 ML/HR: at 23:28

## 2020-01-01 RX ADMIN — IPRATROPIUM BROMIDE 4 PUFF: 17 AEROSOL, METERED RESPIRATORY (INHALATION) at 15:54

## 2020-01-01 RX ADMIN — FUROSEMIDE 80 MG: 10 INJECTION, SOLUTION INTRAMUSCULAR; INTRAVENOUS at 17:36

## 2020-01-01 RX ADMIN — SODIUM CHLORIDE 2.5 MCG/KG/MIN: 900 INJECTION, SOLUTION INTRAVENOUS at 23:50

## 2020-01-01 RX ADMIN — IPRATROPIUM BROMIDE 4 PUFF: 17 AEROSOL, METERED RESPIRATORY (INHALATION) at 04:21

## 2020-01-01 RX ADMIN — PROPOFOL 35 MCG/KG/MIN: 10 INJECTION, EMULSION INTRAVENOUS at 07:06

## 2020-01-01 RX ADMIN — HYDROCORTISONE SODIUM SUCCINATE 100 MG: 100 INJECTION, POWDER, FOR SOLUTION INTRAMUSCULAR; INTRAVENOUS at 20:57

## 2020-01-01 RX ADMIN — ALBUMIN (HUMAN) 25 G: 0.25 INJECTION, SOLUTION INTRAVENOUS at 20:43

## 2020-01-01 RX ADMIN — Medication 250 ML/HR: at 14:35

## 2020-01-01 RX ADMIN — ALBUTEROL SULFATE 4 PUFF: 90 AEROSOL, METERED RESPIRATORY (INHALATION) at 00:11

## 2020-01-01 RX ADMIN — BUMETANIDE 2 MG: 0.25 INJECTION INTRAMUSCULAR; INTRAVENOUS at 20:27

## 2020-01-01 RX ADMIN — CALCIUM GLUCONATE 2 G: 98 INJECTION, SOLUTION INTRAVENOUS at 11:40

## 2020-01-01 RX ADMIN — ALBUTEROL SULFATE 4 PUFF: 90 AEROSOL, METERED RESPIRATORY (INHALATION) at 12:15

## 2020-01-01 RX ADMIN — SODIUM CHLORIDE, PRESERVATIVE FREE 10 ML: 5 INJECTION INTRAVENOUS at 08:54

## 2020-01-01 RX ADMIN — IPRATROPIUM BROMIDE 4 PUFF: 17 AEROSOL, METERED RESPIRATORY (INHALATION) at 11:19

## 2020-01-01 RX ADMIN — Medication 10 ML: at 17:37

## 2020-01-01 RX ADMIN — VASOPRESSIN 0.04 UNITS/MIN: 20 INJECTION INTRAVENOUS at 04:02

## 2020-01-01 RX ADMIN — CEFEPIME 2 G: 2 INJECTION, POWDER, FOR SOLUTION INTRAVENOUS at 13:29

## 2020-01-01 RX ADMIN — BUMETANIDE 2 MG: 0.25 INJECTION INTRAMUSCULAR; INTRAVENOUS at 13:40

## 2020-01-01 RX ADMIN — Medication 1000 ML/HR: at 02:42

## 2020-01-01 RX ADMIN — CHLORHEXIDINE GLUCONATE 0.12% ORAL RINSE 15 ML: 1.2 LIQUID ORAL at 09:06

## 2020-01-01 RX ADMIN — IPRATROPIUM BROMIDE 4 PUFF: 17 AEROSOL, METERED RESPIRATORY (INHALATION) at 23:57

## 2020-01-01 RX ADMIN — PROPOFOL 80 MCG/KG/MIN: 10 INJECTION, EMULSION INTRAVENOUS at 20:25

## 2020-01-01 RX ADMIN — CALCIUM GLUCONATE 1 G: 98 INJECTION, SOLUTION INTRAVENOUS at 14:19

## 2020-01-01 RX ADMIN — IPRATROPIUM BROMIDE 4 PUFF: 17 AEROSOL, METERED RESPIRATORY (INHALATION) at 20:18

## 2020-01-01 RX ADMIN — FAMOTIDINE 20 MG: 10 INJECTION INTRAVENOUS at 08:19

## 2020-01-01 RX ADMIN — ALBUTEROL SULFATE 4 PUFF: 90 AEROSOL, METERED RESPIRATORY (INHALATION) at 15:10

## 2020-01-01 RX ADMIN — Medication 2000 ML/HR: at 08:30

## 2020-01-01 RX ADMIN — PROPOFOL 80 MCG/KG/MIN: 10 INJECTION, EMULSION INTRAVENOUS at 10:55

## 2020-01-01 RX ADMIN — Medication: at 08:50

## 2020-01-01 RX ADMIN — CALCIUM GLUCONATE 1 G: 98 INJECTION, SOLUTION INTRAVENOUS at 05:35

## 2020-01-01 RX ADMIN — ALBUTEROL SULFATE 4 PUFF: 90 AEROSOL, METERED RESPIRATORY (INHALATION) at 08:04

## 2020-01-01 RX ADMIN — REMDESIVIR 100 MG: 100 INJECTION, POWDER, LYOPHILIZED, FOR SOLUTION INTRAVENOUS at 14:20

## 2020-01-01 RX ADMIN — SODIUM CHLORIDE: 9 INJECTION, SOLUTION INTRAVENOUS at 05:04

## 2020-01-01 RX ADMIN — ALBUTEROL SULFATE 4 PUFF: 90 AEROSOL, METERED RESPIRATORY (INHALATION) at 16:19

## 2020-01-01 RX ADMIN — ENOXAPARIN SODIUM 40 MG: 100 INJECTION SUBCUTANEOUS at 09:49

## 2020-01-01 RX ADMIN — IPRATROPIUM BROMIDE 4 PUFF: 17 AEROSOL, METERED RESPIRATORY (INHALATION) at 15:12

## 2020-01-01 RX ADMIN — PROPOFOL 80 MCG/KG/MIN: 10 INJECTION, EMULSION INTRAVENOUS at 20:36

## 2020-01-01 RX ADMIN — VASOPRESSIN 0.04 UNITS/MIN: 20 INJECTION INTRAVENOUS at 18:47

## 2020-01-01 RX ADMIN — DEXAMETHASONE 8 MG: 4 TABLET ORAL at 23:03

## 2020-01-01 SDOH — HEALTH STABILITY: MENTAL HEALTH: HOW OFTEN DO YOU HAVE A DRINK CONTAINING ALCOHOL?: NEVER

## 2020-01-01 ASSESSMENT — PULMONARY FUNCTION TESTS
PIF_VALUE: 36
PIF_VALUE: 31
PIF_VALUE: 43
PIF_VALUE: 43
PIF_VALUE: 31
PIF_VALUE: 33
PIF_VALUE: 35
PIF_VALUE: 30
PIF_VALUE: 31
PIF_VALUE: 26
PIF_VALUE: 39
PIF_VALUE: 36
PIF_VALUE: 38
PIF_VALUE: 36
PIF_VALUE: 31
PIF_VALUE: 40
PIF_VALUE: 33
PIF_VALUE: 38
PIF_VALUE: 36
PIF_VALUE: 35
PIF_VALUE: 40
PIF_VALUE: 33
PIF_VALUE: 43
PIF_VALUE: 35
PIF_VALUE: 36
PEFR_L/MIN: 4
PIF_VALUE: 31
PIF_VALUE: 36
PIF_VALUE: 35
PIF_VALUE: 44
PIF_VALUE: 25
PIF_VALUE: 41
PIF_VALUE: 43
PIF_VALUE: 30
PIF_VALUE: 38
PIF_VALUE: 35
PIF_VALUE: 35
PIF_VALUE: 36
PIF_VALUE: 40
PIF_VALUE: 29
PIF_VALUE: 24
PIF_VALUE: 30
PIF_VALUE: 37
PIF_VALUE: 35
PIF_VALUE: 32
PIF_VALUE: 36
PIF_VALUE: 43
PIF_VALUE: 37
PIF_VALUE: 30
PIF_VALUE: 38
PIF_VALUE: 38
PIF_VALUE: 44
PIF_VALUE: 36
PIF_VALUE: 29
PIF_VALUE: 44
PIF_VALUE: 38
PIF_VALUE: 36
PIF_VALUE: 43
PIF_VALUE: 30
PIF_VALUE: 30
PIF_VALUE: 36
PIF_VALUE: 27
PIF_VALUE: 42
PIF_VALUE: 32
PIF_VALUE: 27
PIF_VALUE: 24
PIF_VALUE: 43
PIF_VALUE: 38
PIF_VALUE: 36
PIF_VALUE: 28
PIF_VALUE: 38
PIF_VALUE: 37
PIF_VALUE: 38
PIF_VALUE: 34
PIF_VALUE: 43
PIF_VALUE: 43
PIF_VALUE: 30
PIF_VALUE: 38
PIF_VALUE: 35
PIF_VALUE: 43
PIF_VALUE: 22
PIF_VALUE: 24
PIF_VALUE: 35
PIF_VALUE: 34
PIF_VALUE: 43
PIF_VALUE: 28
PIF_VALUE: 37
PIF_VALUE: 35
PIF_VALUE: 39
PIF_VALUE: 36
PIF_VALUE: 33
PIF_VALUE: 27
PIF_VALUE: 36
PIF_VALUE: 20
PIF_VALUE: 36
PIF_VALUE: 26
PIF_VALUE: 40
PIF_VALUE: 19
PIF_VALUE: 34
PIF_VALUE: 36
PIF_VALUE: 35
PIF_VALUE: 31
PIF_VALUE: 36
PIF_VALUE: 39
PIF_VALUE: 43
PIF_VALUE: 44
PIF_VALUE: 36
PIF_VALUE: 30
PIF_VALUE: 30
PIF_VALUE: 34
PIF_VALUE: 40
PIF_VALUE: 36
PIF_VALUE: 26
PIF_VALUE: 26
PIF_VALUE: 25
PIF_VALUE: 43
PIF_VALUE: 36
PIF_VALUE: 37
PIF_VALUE: 30
PIF_VALUE: 36
PIF_VALUE: 26
PIF_VALUE: 36
PIF_VALUE: 34
PIF_VALUE: 35
PIF_VALUE: 24
PIF_VALUE: 28
PIF_VALUE: 40
PIF_VALUE: 32
PIF_VALUE: 39
PIF_VALUE: 37
PIF_VALUE: 26
PIF_VALUE: 31
PIF_VALUE: 25
PIF_VALUE: 44
PIF_VALUE: 38
PIF_VALUE: 35
PIF_VALUE: 40
PIF_VALUE: 36
PIF_VALUE: 36
PIF_VALUE: 33
PIF_VALUE: 44
PIF_VALUE: 34
PIF_VALUE: 30
PIF_VALUE: 38
PIF_VALUE: 43
PIF_VALUE: 25
PIF_VALUE: 26
PIF_VALUE: 36
PIF_VALUE: 30
PIF_VALUE: 38
PIF_VALUE: 30
PIF_VALUE: 35
PIF_VALUE: 39
PIF_VALUE: 43
PIF_VALUE: 22
PIF_VALUE: 37
PIF_VALUE: 40
PIF_VALUE: 33
PIF_VALUE: 40
PIF_VALUE: 39
PIF_VALUE: 40
PIF_VALUE: 28
PIF_VALUE: 29
PIF_VALUE: 31
PIF_VALUE: 43
PIF_VALUE: 36
PIF_VALUE: 27
PIF_VALUE: 38
PIF_VALUE: 36
PIF_VALUE: 22
PIF_VALUE: 30
PIF_VALUE: 29
PIF_VALUE: 43
PIF_VALUE: 44
PIF_VALUE: 43
PIF_VALUE: 31
PIF_VALUE: 36
PIF_VALUE: 35
PIF_VALUE: 36
PIF_VALUE: 35
PIF_VALUE: 34
PIF_VALUE: 30
PIF_VALUE: 30
PIF_VALUE: 35
PIF_VALUE: 33
PIF_VALUE: 44
PIF_VALUE: 40
PIF_VALUE: 27
PIF_VALUE: 34
PIF_VALUE: 35
PIF_VALUE: 30
PIF_VALUE: 39
PIF_VALUE: 25
PIF_VALUE: 39
PIF_VALUE: 26
PIF_VALUE: 38
PIF_VALUE: 44
PIF_VALUE: 36
PIF_VALUE: 38
PIF_VALUE: 40
PIF_VALUE: 33
PIF_VALUE: 37
PIF_VALUE: 36
PIF_VALUE: 37
PIF_VALUE: 28
PIF_VALUE: 34
PIF_VALUE: 38
PIF_VALUE: 30
PIF_VALUE: 34
PIF_VALUE: 43
PIF_VALUE: 30
PIF_VALUE: 38
PIF_VALUE: 35
PIF_VALUE: 43
PIF_VALUE: 22
PIF_VALUE: 39
PIF_VALUE: 33
PIF_VALUE: 38
PIF_VALUE: 35
PIF_VALUE: 32
PIF_VALUE: 38
PIF_VALUE: 30
PIF_VALUE: 25
PIF_VALUE: 35
PIF_VALUE: 35
PIF_VALUE: 36
PIF_VALUE: 25
PIF_VALUE: 37
PIF_VALUE: 34
PIF_VALUE: 31
PIF_VALUE: 38
PIF_VALUE: 33
PIF_VALUE: 30
PIF_VALUE: 40
PIF_VALUE: 30
PIF_VALUE: 26
PIF_VALUE: 44
PIF_VALUE: 37
PIF_VALUE: 40
PIF_VALUE: 26
PIF_VALUE: 34
PIF_VALUE: 38
PIF_VALUE: 43
PIF_VALUE: 39
PIF_VALUE: 24
PIF_VALUE: 44
PIF_VALUE: 26
PIF_VALUE: 34
PIF_VALUE: 40
PIF_VALUE: 31
PIF_VALUE: 44
PIF_VALUE: 32
PIF_VALUE: 32
PIF_VALUE: 40
PIF_VALUE: 31
PIF_VALUE: 25
PIF_VALUE: 29
PIF_VALUE: 25
PIF_VALUE: 31
PIF_VALUE: 31
PIF_VALUE: 38
PIF_VALUE: 40
PIF_VALUE: 38
PIF_VALUE: 40
PIF_VALUE: 35
PIF_VALUE: 35
PIF_VALUE: 38
PIF_VALUE: 25
PIF_VALUE: 43
PIF_VALUE: 43
PIF_VALUE: 36
PIF_VALUE: 31
PIF_VALUE: 40
PIF_VALUE: 29
PIF_VALUE: 36
PIF_VALUE: 39
PIF_VALUE: 30
PIF_VALUE: 35
PIF_VALUE: 32
PIF_VALUE: 29
PIF_VALUE: 43
PIF_VALUE: 38
PIF_VALUE: 44
PIF_VALUE: 43
PIF_VALUE: 39
PIF_VALUE: 20
PIF_VALUE: 29
PIF_VALUE: 38
PIF_VALUE: 22
PIF_VALUE: 27
PIF_VALUE: 30
PIF_VALUE: 36
PIF_VALUE: 33
PIF_VALUE: 40
PIF_VALUE: 24
PIF_VALUE: 30
PIF_VALUE: 27
PIF_VALUE: 36
PIF_VALUE: 39
PIF_VALUE: 36
PIF_VALUE: 36
PIF_VALUE: 28
PIF_VALUE: 27
PIF_VALUE: 37
PIF_VALUE: 24
PIF_VALUE: 22
PIF_VALUE: 30
PIF_VALUE: 43
PIF_VALUE: 29
PIF_VALUE: 44
PIF_VALUE: 44
PIF_VALUE: 26
PIF_VALUE: 35
PIF_VALUE: 30
PIF_VALUE: 17
PIF_VALUE: 39
PIF_VALUE: 36
PIF_VALUE: 35
PIF_VALUE: 31
PIF_VALUE: 43
PIF_VALUE: 40
PIF_VALUE: 23
PIF_VALUE: 33
PIF_VALUE: 38
PIF_VALUE: 23
PIF_VALUE: 36
PIF_VALUE: 36
PIF_VALUE: 35
PIF_VALUE: 34
PIF_VALUE: 41
PIF_VALUE: 38
PIF_VALUE: 40
PIF_VALUE: 35
PIF_VALUE: 40
PIF_VALUE: 40
PIF_VALUE: 27
PIF_VALUE: 31
PIF_VALUE: 43
PIF_VALUE: 32
PIF_VALUE: 26
PIF_VALUE: 39
PIF_VALUE: 35
PIF_VALUE: 31
PIF_VALUE: 35
PIF_VALUE: 26
PIF_VALUE: 36
PIF_VALUE: 36
PIF_VALUE: 31
PIF_VALUE: 38
PIF_VALUE: 36
PIF_VALUE: 26
PIF_VALUE: 31
PIF_VALUE: 36
PIF_VALUE: 43
PIF_VALUE: 26
PIF_VALUE: 35
PIF_VALUE: 43
PIF_VALUE: 40
PIF_VALUE: 36
PIF_VALUE: 26
PIF_VALUE: 40
PIF_VALUE: 25
PIF_VALUE: 36
PIF_VALUE: 35
PIF_VALUE: 43
PIF_VALUE: 36
PIF_VALUE: 43
PIF_VALUE: 35
PIF_VALUE: 43
PIF_VALUE: 39
PIF_VALUE: 25
PIF_VALUE: 22
PIF_VALUE: 36
PIF_VALUE: 33
PIF_VALUE: 22
PIF_VALUE: 43
PIF_VALUE: 40
PIF_VALUE: 30
PIF_VALUE: 26
PIF_VALUE: 36
PIF_VALUE: 31
PIF_VALUE: 37
PIF_VALUE: 24
PIF_VALUE: 38
PIF_VALUE: 32
PIF_VALUE: 27
PIF_VALUE: 35
PIF_VALUE: 36
PIF_VALUE: 33
PIF_VALUE: 36
PIF_VALUE: 26
PIF_VALUE: 36
PIF_VALUE: 43
PIF_VALUE: 35
PIF_VALUE: 44
PIF_VALUE: 32
PIF_VALUE: 26
PIF_VALUE: 35
PIF_VALUE: 36
PIF_VALUE: 43
PIF_VALUE: 24
PIF_VALUE: 29
PIF_VALUE: 43
PIF_VALUE: 26
PIF_VALUE: 43
PIF_VALUE: 35
PIF_VALUE: 40
PIF_VALUE: 22
PIF_VALUE: 35

## 2020-01-01 ASSESSMENT — PAIN SCALES - GENERAL
PAINLEVEL_OUTOF10: 0
PAINLEVEL_OUTOF10: 3
PAINLEVEL_OUTOF10: 0
PAINLEVEL_OUTOF10: 4
PAINLEVEL_OUTOF10: 0
PAINLEVEL_OUTOF10: 5
PAINLEVEL_OUTOF10: 0
PAINLEVEL_OUTOF10: 1
PAINLEVEL_OUTOF10: 3
PAINLEVEL_OUTOF10: 0

## 2020-01-01 ASSESSMENT — PAIN DESCRIPTION - DESCRIPTORS: DESCRIPTORS: ACHING;DISCOMFORT

## 2020-01-01 ASSESSMENT — PAIN DESCRIPTION - PAIN TYPE: TYPE: ACUTE PAIN

## 2020-01-01 ASSESSMENT — PAIN DESCRIPTION - LOCATION: LOCATION: HEAD

## 2020-11-18 NOTE — TELEPHONE ENCOUNTER
Reason for Disposition   Fever > 103 F (39.4 C)    Answer Assessment - Initial Assessment Questions  1. COVID-19 DIAGNOSIS: \"Who made your Coronavirus (COVID-19) diagnosis? \" \"Was it confirmed by a positive lab test?\" If not diagnosed by a HCP, ask \"Are there lots of cases (community spread) where you live? \" (See public health department website, if unsure)      Not diagnosed   2. ONSET: \"When did the COVID-19 symptoms start? \"       Sunday afternoon   3. WORST SYMPTOM: \"What is your worst symptom? \" (e.g., cough, fever, shortness of breath, muscle aches)      Fatigue   4. COUGH: \"Do you have a cough? \" If so, ask: \"How bad is the cough? \"        no  5. FEVER: \"Do you have a fever? \" If so, ask: \"What is your temperature, how was it measured, and when did it start? \"      103.6  6. RESPIRATORY STATUS: \"Describe your breathing? \" (e.g., shortness of breath, wheezing, unable to speak)       no  7. BETTER-SAME-WORSE: Latia Cordial you getting better, staying the same or getting worse compared to yesterday? \"  If getting worse, ask, \"In what way? \"      Worse   8. HIGH RISK DISEASE: \"Do you have any chronic medical problems? \" (e.g., asthma, heart or lung disease, weak immune system, etc.)      High blood pressure   9. PREGNANCY: \"Is there any chance you are pregnant? \" \"When was your last menstrual period? \"      no  10. OTHER SYMPTOMS: \"Do you have any other symptoms? \"  (e.g., chills, fatigue, headache, loss of smell or taste, muscle pain, sore throat)        no    Protocols used: CORONAVIRUS (COVID-19) DIAGNOSED OR SUSPECTED-ADULT-[place]    Patient called COVID-19 line     Brief description of triage: pt reports high fever of 103.6 - fatigue- congestion with a covid-19 exposure last week. Tried to reach PCP office & on call number ([phone] & [phone]) without success.  PT referred to nearby [facility] for further evaluaton      Triage indicates for patient to pcp/ucc    Care advice provided, patient verbalizes understanding; denies any other questions or concerns; instructed to call back for any new or worsening symptoms. Attention Provider: Thank you for allowing me to participate in the care of your patient. The patient was connected to triage in response to information provided to the ECC. Please do not respond through this encounter as the response is not directed to a shared pool.

## 2020-11-19 NOTE — ED NOTES
Pt discharged with instructions and prescriptions. Discussed when and how to take medications and pt stated understanding. Pt walked out of the ER      Pt is to call PCP tomorrow and follow up care for home. Educated pt on fever reducer at home with tylenol and ibuprofen.        Chuyita Peres RN  11/18/20 0844

## 2020-11-19 NOTE — ED TRIAGE NOTES
Arrived ambulatory to room 3 for triage. Tolerated without difficulty. Bed in lowest position. Call light given.

## 2020-11-19 NOTE — CARE COORDINATION
Attempted outreach call for ED f/u and COVID-19 monitoring; left a VM with ACM call-back information. If no call back, will make another attempt. Ian Rbolero RN, BSN  Care Coordinator  Cell [phone]  Email [email or web address]

## 2020-11-20 NOTE — CARE COORDINATION
Patient contacted regarding [id number] diagnosis\". Discussed COVID-19 related testing which was available at this time. Test results were positive. Patient informed of results, if available? Yes    Care Transition Nurse/ Ambulatory Care Manager contacted the spouse and patient. by telephone to perform post discharge assessment. Call within 2 business days of discharge: Yes. Verified name and  with spouse and patient as identifiers. Provided introduction to self, and explanation of the CTN/ACM role, and reason for call due to risk factors for infection and/or exposure to COVID-19. Symptoms reviewed with spouse and patient who verbalized the following symptoms: fever, fatigue, pain or aching joints, cough, nausea and no worsening symptoms. Due to no new or worsening symptoms encounter will attempt to route. routed to provider for escalation. Discussed follow-up appointments. If no appointment was previously scheduled, appointment scheduling offered: [facility]): No future appointments. Non-[facility] follow up appointment(s): n/a    Non-face-to-face services provided:  Obtained and reviewed discharge summary and/or continuity of care documents     Advance Care Planning:   Does patient have an Advance Directive:  not on file. Patient has following risk factors of: no known risk factors and hypertension. CTN/ACM reviewed discharge instructions, medical action plan and red flags such as increased shortness of breath, increasing fever and signs of decompensation with spouse and patient who verbalized understanding. Discussed exposure protocols and quarantine with CDC Guidelines What to do if you are sick with coronavirus disease .  spouse and patient was given an opportunity for questions and concerns.  The spouse and patient agrees to contact the [facility] exposure line [phone], [facility]: ([phone]) and PCP office for questions

## 2020-11-22 PROBLEM — J96.01 ACUTE RESPIRATORY FAILURE WITH HYPOXIA (HCC): Status: ACTIVE | Noted: 2020-01-01

## 2020-11-22 NOTE — ED PROVIDER NOTES
Emergency Department Encounter    Patient: Rashid Terry  MRN: [id number]  : 1945  Date of Evaluation: 2020  ED Provider:  Jeffry Dc    Triage Chief Complaint:   Shortness of Breath (c/o some increased SOB w/ exertion today, but also c/o increased anxiety do to Covid. Pt. had a positive Covid and positive CT scan showing pneumonia)    [place]:  Rashid Terry is a 76 y.o. male that presents with complaint of shortness of breath, pulse ox 84-92% at home. Was diagnosed with COVID earlier in the week, on antibiotics for pneumonia seen on CT. Has been having increased shortness of breath, oxygen was maintaining >93% until last night. Last night started noting it would drop, then come back up but never stay up for long. On one occasion saw it drop to 84%. Today was having increased SOB, felt like he was very anxious and thought maybe it was just anxiety but noted again pulse ox dropped and so came in to be evaluated. Some pain with aching all over, some pain in chest 10. Coughing still, was getting up a little mucous but none now. No continued fevers. No nausea or vomiting. No abdominal pain. No leg swelling or pain. Not on blood thinners. Reviewed chart:  Positive test was on 20. Was placed on augmentin and azithro for concerning CT findings, possible PNA. ROS - see HPI, below listed is current ROS at time of my eval:  10 systems reviewed and negative except as above. Past Medical History:   Diagnosis Date    GERD (gastroesophageal reflux disease)     Hyperlipidemia     Hypertension      Past Surgical History:   Procedure Laterality Date    APPENDECTOMY       No family history on file.   Social History     Socioeconomic History    Marital status:      Spouse name: Not on file    Number of children: Not on file    Years of education: Not on file    Highest education level: Not on file   Occupational History    Not on file   Social Needs    Financial resource strain: Not on file    Food insecurity     Worry: Not on file     Inability: Not on file    Transportation needs     Medical: Not on file     Non-medical: Not on file   Tobacco Use    Smoking status: Never Smoker    Smokeless tobacco: Never Used   Substance and Sexual Activity    Alcohol use: Never     Frequency: Never    Drug use: Never    Sexual activity: Not on file   Lifestyle    Physical activity     Days per week: Not on file     Minutes per session: Not on file    Stress: Not on file   Relationships    Social connections     Talks on phone: Not on file     Gets together: Not on file     Attends Restoration service: Not on file     Active member of club or organization: Not on file     Attends meetings of clubs or organizations: Not on file     Relationship status: Not on file    Intimate partner violence     Fear of current or ex partner: Not on file     Emotionally abused: Not on file     Physically abused: Not on file     Forced sexual activity: Not on file   Other Topics Concern    Not on file   Social History Narrative    Not on file     Current Facility-Administered Medications   Medication Dose Route Frequency Provider Last Rate Last Dose    0.9 % sodium chloride infusion   Intravenous Continuous Sade Bryan  mL/hr at 11/22/20 1626      azithromycin (ZITHROMAX) 500 mg in dextrose 5 % 250 mL IVPB  500 mg Intravenous Once Sade Bryan  mL/hr at 11/22/20 1801 500 mg at 11/22/20 1801     Current Outpatient Medications   Medication Sig Dispense Refill    famotidine (PEPCID) 40 MG tablet Take 40 mg by mouth daily      omeprazole 20 MG EC tablet Take 20 mg by mouth daily      amoxicillin-clavulanate (AUGMENTIN) 875-125 MG per tablet Take 1 tablet by mouth 2 times daily for 10 days 20 tablet 0    azithromycin (ZITHROMAX) 250 MG tablet Take 1 tablet by mouth See Admin Instructions for 5 days 500mg on day 1 followed by 250mg on days 2 - 5 6 tablet 0    losartan-hydrochlorothiazide FL    Differential Type AUTOMATED DIFFERENTIAL     Segs Relative 94.6 (H) 36 - 66 %    Lymphocytes % 1.5 (L) 24 - 44 %    Monocytes % 2.3 0 - 4 %    Eosinophils % 0.1 0 - 3 %    Basophils % 0.2 0 - 1 %    Segs Absolute 15.5 K/CU MM    Lymphocytes Absolute 0.2 K/CU MM    Monocytes Absolute 0.4 K/CU MM    Eosinophils Absolute 0.0 K/CU MM    Basophils Absolute 0.0 K/CU MM    Immature Neutrophil % 1.3 (H) 0 - 0.43 %    Total Immature Neutrophil 0.21 K/CU MM   Comprehensive Metabolic Panel w/ Reflex to MG   Result Value Ref Range    Sodium 130 (L) 135 - 145 MMOL/L    Potassium 3.2 (L) 3.5 - 5.1 MMOL/L    Chloride 91 (L) 99 - 110 mMol/L    CO2 34 (H) 21 - 32 MMOL/L    BUN 23 6 - 23 MG/DL    CREATININE 1.1 0.9 - 1.3 MG/DL    Glucose 130 (H) 70 - 99 MG/DL    Calcium 8.0 (L) 8.3 - 10.6 MG/DL    Alb 3.2 (L) 3.4 - 5.0 GM/DL    Total Protein 6.8 6.4 - 8.2 GM/DL    Total Bilirubin 1.0 0.0 - 1.0 MG/DL    ALT 35 10 - 40 U/L    AST 54 (H) 15 - 37 IU/L    Alkaline Phosphatase 186 (H) 40 - 129 IU/L    GFR Non-African American >60 >60 mL/min/1.73m2    GFR African American >60 >60 mL/min/1.73m2    Anion Gap 5 4 - 16   Urinalysis   Result Value Ref Range    Color, UA YELLOW YELLOW    Clarity, UA SL HAZY CLEAR    Glucose, Urine NEGATIVE NEGATIVE MG/DL    Bilirubin Urine NEGATIVE NEGATIVE MG/DL    Ketones, Urine TRACE NEGATIVE MG/DL    Specific Gravity, UA 1.020 1.001 - 1.035    Blood, Urine SMALL NEGATIVE    pH, Urine 5.0 5.0 - 8.0    Protein,  (A) NEGATIVE MG/DL    Urobilinogen, Urine 0.2 0.2 - 1.0 MG/DL    Nitrite Urine, Quantitative NEGATIVE NEGATIVE    Leukocyte Esterase, Urine NEGATIVE NEGATIVE    RBC, UA 2 TO 3 0 - 3 /HPF    WBC, UA NEGATIVE 0 - 2 /HPF    Epithelial Cells, UA 0 TO 1 /HPF    Cast Type NEGATIVE (A) NO CAST FORMS SEEN /HPF    Bacteria, UA FEW NEGATIVE /HPF    Crystal Type NEGATIVE NEGATIVE /HPF   Lactate, Sepsis   Result Value Ref Range    Lactic Acid, Sepsis 1.9 0.5 - 1.9 mMOL/L   Troponin   Result Value Ref Range    Troponin T <0.010 <0.01 NG/ML   Brain Natriuretic Peptide   Result Value Ref Range    Pro-.3 (H) <300 PG/ML   Magnesium   Result Value Ref Range    Magnesium 2.3 1.8 - 2.4 mg/dl   EKG 12 lead   Result Value Ref Range    Ventricular Rate 109 BPM    Atrial Rate 109 BPM    P-R Interval 132 ms    QRS Duration 80 ms    Q-T Interval 314 ms    QTc Calculation (Bazett) 422 ms    P Axis 43 degrees    R Axis 42 degrees    T Axis 26 degrees    Diagnosis       Sinus tachycardia  Otherwise normal ECG  No previous ECGs available        Radiographs (if obtained):  Radiologist's Report Reviewed:  Ct Chest W Contrast    Result Date: 11/19/2020  EXAMINATION: CT OF THE CHEST WITH CONTRAST 11/18/2020 9:20 pm TECHNIQUE: CT of the chest was performed with the administration of intravenous contrast. Multiplanar reformatted images are provided for review. Dose modulation, iterative reconstruction, and/or weight based adjustment of the mA/kV was utilized to reduce the radiation dose to as low as reasonably achievable. COMPARISON: None HISTORY: ORDERING SYSTEM PROVIDED HISTORY: Right upper lobe mass vs infiltrate on Chest x-ray TECHNOLOGIST PROVIDED HISTORY: Reason for exam:->Right upper lobe mass vs infiltrate on Chest x-ray Reason for Exam: Right upper lobe mass vs infiltrate on Chest x-ray Acuity: Acute Type of Exam: Initial FINDINGS: Mediastinum: A 1.6 cm short axis precarinal is noted. There is otherwise no mediastinal adenopathy by size criteria. The heart and pericardium are without acute abnormality. There are coronary artery calcifications. The thoracic aorta is normal in caliber. Moderate atherosclerotic plaque. Lungs/pleura: There is a large right upper lobe ground-glass opacity. No effusion or pneumothorax. A noncalcified 6 mm right lower lobe pulmonary nodule and a 3 mm right middle lobe pulmonary nodule are noted. Upper Abdomen: Limited images of the upper abdomen are without acute findings.   Status post cholecystectomy. There are bilateral renal cystic changes. Incidental note of mild pancreatic atrophy. Soft Tissues/Bones: Flowing anterior disc osteophyte complexes throughout the thoracic spine may indicate DISH. No acute or aggressive osseous lesions. 1. Large right upper lobe focus of ground-glass attenuation. Imaging features can be seen with COVID-19 pneumonia, though are nonspecific and can occur with a variety of infectious and noninfectious processes. PneInd 2. Noncalcified right sided pulmonary nodules measuring up to 6 mm. Please see follow up recommendations below. 3. Coronary artery disease. 4. An enlarged 1.6 cm precarinal short axis lymph node is indeterminate but may be reactive. Attention on follow-up imaging. RECOMMENDATIONS: Multiple pulmonary nodules. Most severe: 6.0 mm solid pulmonary nodule. Recommend a non-contrast Chest CT at 3-6 months, then consider another non-contrast Chest CT at 18-24 months. These guidelines do not apply to immunocompromised patients and patients with cancer. Follow up in patients with significant comorbidities as clinically warranted. For lung cancer screening, adhere to Lung-RADS guidelines. Reference: Radiology. 2017; 284(1):228-43. Xr Chest Portable    Result Date: 11/18/2020  EXAMINATION: ONE XRAY VIEW OF THE CHEST 11/18/2020 8:06 pm COMPARISON: Chest radiograph 06/25/2008. HISTORY: ORDERING SYSTEM PROVIDED HISTORY: cough TECHNOLOGIST PROVIDED HISTORY: Reason for exam:->cough Reason for Exam: cough Acuity: Acute Type of Exam: Initial FINDINGS: The cardiomediastinal silhouette is within normal limits. Large ill-defined opacity in the right upper lobe. The left lung is clear. No pneumothorax, vascular congestion, or pleural effusion. No acute osseous abnormality     Large nonspecific opacity in the right upper lobe. This may represent pneumonia although a mass is not excluded.        EKG (if obtained): (All EKG's are interpreted by myself in the absence of a cardiologist)  Sinus tachycardia with rate of 109 bpm, normal intervals. No ST elevation. No previous to compare. MDM:  43-year-old male with history as above presents with concern for shortness of breath, known Covid, he had borderline oxygen saturations at home, 92 to 93% but then started dropping last night, here it does appear that he has dropped. He was initially 90 to 91% but dropped to 84% fairly quickly. He does have some increased work of breathing, placed on nasal cannula with improvement, however he did start to have some increased work of breathing even from that. Sepsis labs have been ordered, I have ordered community-acquired pneumonia antibiotics given findings on CT chest from a couple of days ago. He is in Covid isolation, appropriate precautions were taken. Labs have been sent. 66 91 21- patient still with increased work of breathing, pulse ox now 89-90% on NC, will move to vapotherm for more support. CXR with worsening consolidation. He is doing better on the Vapotherm, his white count is elevated, his lactic acid is normal, his electrolytes show normal bicarb and creatinine. He is receiving Decadron in the antibiotics, we will need to transfer him to [facility] for admission as we are not accepting Covid positive patients at this hospital at this time. He expressed understanding of this and is comfortable with this plan. Hospitalist consult has been placed    Total critical care time today provided was 45 minutes. This excludes seperately billable procedure. Critical care time provided for respiratory distress, hypoxia, covid, pneumonia that required close evaluation and/or intervention with concern for patient decompensation. Clinical Impression:  1. COVID-19    2. Pneumonia due to organism    3. Respiratory distress      Disposition referral (if applicable):  No follow-up provider specified.   Disposition medications (if applicable):  New Prescriptions    No medications on file     ED Provider Disposition Time  DISPOSITION Decision To Transfer 11/22/2020 06:37:25 PM      Comment: Please note this report has been produced using speech recognition software and may contain errors related to that system including errors in grammar, punctuation, and spelling, as well as words and phrases that may be inappropriate. Efforts were made to edit the dictations. Patti Sellers MD  11/22/20 6698        Spoke with Dr. Anu Tillman at [facility], plan for transfer to their team, inpatient, step down, COVID unit. Awaiting bed assignment for transport to be arranged. Patient remains stable.         Patti Sellers MD  11/22/20 5249

## 2020-11-22 NOTE — TELEPHONE ENCOUNTER
Oxygen sat. to 86% and 84%   Dx of pneumonia at hospital they did put him on atb. Since wed. Night he was on IV that night and left Thursday morning       Reason for Disposition   MODERATE difficulty breathing (e.g., speaks in phrases, SOB even at rest, pulse 100-120)    Answer Assessment - Initial Assessment Questions  1. COVID-19 DIAGNOSIS: \"Who made your Coronavirus (COVID-19) diagnosis? \" \"Was it confirmed by a positive lab test?\" If not diagnosed by a HCP, ask \"Are there lots of cases (community spread) where you live? \" (See public health department website, if unsure)      pcp    2. ONSET: \"When did the COVID-19 symptoms start? \"       Week ago     3. WORST SYMPTOM: \"What is your worst symptom? \" (e.g., cough, fever, shortness of breath, muscle aches)      Oxygen is decreased to 84-86    4. COUGH: \"Do you have a cough? \" If so, ask: \"How bad is the cough? \"        Yes     5. FEVER: \"Do you have a fever? \" If so, ask: \"What is your temperature, how was it measured, and when did it start? \"      Yes    6. RESPIRATORY STATUS: \"Describe your breathing? \" (e.g., shortness of breath, wheezing, unable to speak)       No short of breath no wheezing     7. BETTER-SAME-WORSE: Catheline Aus you getting better, staying the same or getting worse compared to yesterday? \"  If getting worse, ask, \"In what way? \"      Feels better but oxygen sat worse     8. HIGH RISK DISEASE: \"Do you have any chronic medical problems? \" (e.g., asthma, heart or lung disease, weak immune system, etc.)      No   9. PREGNANCY: \"Is there any chance you are pregnant? \" \"When was your last menstrual period? \"      Na    10. OTHER SYMPTOMS: \"Do you have any other symptoms? \"  (e.g., chills, fatigue, headache, loss of smell or taste, muscle pain, sore throat)        Na    Protocols used: CORONAVIRUS (COVID-19) DIAGNOSED OR SUSPECTED-ADULT-AH    Patient called pre-service [facility]) covid with red flag complaint.      Brief description of triage: covid worsening symptoms    Triage indicates for patient to go to ED now     Care advice provided, patient verbalizes understanding; denies any other questions or concerns; instructed to call back for any new or worsening symptoms. Attention Provider: Thank you for allowing me to participate in the care of your patient. The patient was connected to triage in response to information provided to the ECC. Please do not respond through this encounter as the response is not directed to a shared pool.

## 2020-11-22 NOTE — ED NOTES
Patient placed on Vapotherm on the following settings: Fi02 55%,  Flow 25 LPM and temp 33 C.  VS , RR 22 and Sp02 96%. Patient WOB improving.

## 2020-11-22 NOTE — ED NOTES
Called access center to have [facility] hospitalist paged for consult      Mary Rosas  11/22/20 6179

## 2020-11-23 NOTE — PROGRESS NOTES
Patient seen and examined, continues on high flow oxygen, continue breathing treatments, Decadron, antibiotics and his convalescent plasma. Patient not a candidate for remdesivir.   Monitor closely

## 2020-11-23 NOTE — ED NOTES
Bryan Shi is a 50year old female. HPI:       Vertigo:  Persistently intermittent. Symptoms somewhat relieved with meclizine as needed. Hypothyroidism:  TSH overly suppressed.   Patient currently taking levothyroxine 125 mcg p.o. every mornin Pt arrived to room 1 ambulatory. Pt states he was here a few days ago and was diagnosed with COVID. Pt states today he began feeling worse with increased SOB. On arrival oxygen saturations in the low 80's. Pt placed on oxygen at 4 L per NC. Oximetry up to 91-92%. Pt is on cardiac monitor and continuous pulse oximetry and blood pressure monitoring. IV started right hand. Flushes well. Unable to obtain blood work from IV stick. Labs drawn by Christiana Jordan. IVP Decadron given and ATB hung and infused. Pt placed on vapo therm oxygen. Spoke with wife and informed her [facility] but it would be several hours before we knew anything. Got phone numbers and informed her we would call and let her know when we had a bed assignment. Informed pt of this. Supervisor states pt's sats are down in the 80's and pt switched to NRB mask by supervisor. Pt has used urinal several times. Awaiting bed assignment. Report given to [facility] who will assume care of this pt.       Rosa Lundberg RN  11/22/20 2009 Tab Take 1 tablet (4 mg total) by mouth nightly as needed. 30 tablet 0   • ondansetron 8 MG Oral Tablet Dispersible Take 1 tablet (8 mg total) by mouth every 8 (eight) hours as needed for Nausea.  30 tablet 0   • ALPRAZolam 0.5 MG Oral Tab 1/2 to one tab da Morbid obesity with BMI of 45.0-49.9, adult ([facility] [address] 7/13/2014   • Open angle with borderline findings, high risk, bilateral 03/27/2019    Nando Calle M.D.   • Osteoarthritis    • Other vitreous opacities, left eye 03/27/2019    Nando Calle M.D.   • Mando Acosta of hard liqour weekends    Drug use: No        Family History   Problem Relation Age of Onset   • Cancer Maternal Aunt         breast, colon   • Breast Cancer Maternal Aunt 48        In her 52's. • Ovarian Cancer Maternal Aunt 60        In her 63's.    • wheezes/ronchi/rales/crackles  CARDIO: RRR, +S1/S2, no mm/S3/S4  VASCULAR: No lower extremity edema  Musculoskeletal: Right lateral epicondyle with moderate to moderately severe tenderness to firm palpation.   NEURO: Alert and Oriented x3, CN II-XII grossly <130 mg/dL 72 124 90   Patient Fasting?        Yes     HEMOGLOBIN A1c      <5.7 % 5.4 5.3 5.7 (H)   ESTIMATED AVERAGE GLUCOSE      68 - 126 mg/dL 108 105 117     Component      Latest Ref Rng & Units 1/23/2019   Cholesterol, Total      <200 mg/dL 161   HDL total) by mouth before breakfast.  Dispense: 30 tablet; Refill: 1  - TSH+FREE T4; Future    3. Lateral epicondylitis of right elbow  Patient referred to physical therapist to evaluate and treat.     - OP REFERRAL TO [facility]    4. Acu

## 2020-11-23 NOTE — H&P
HISTORY AND PHYSICAL  (Hospitalist, Internal Medicine)  IDENTIFYING INFORMATION   PATIENT:  Perez Gordon  MRN:  [id number]  ADMIT DATE: 11/23/2020    Patient seen and examined 11/23/2020-4 AM  CHIEF COMPLAINT   Transferred from [place] for acute hypoxic respiratory failure due to [address] [facility] Thom Lo is a 76 y.o. male with hypertension, hyperlipidemia, GERD who was tested positive for Covid 11/18/2020, was discharged home on Augmentin, azithromycin. Patient reported that he was alternating Tylenol, Advil at home, was checking his oxygen daily. Since Saturday his oxygen saturation dropped down to 87-88% which prompted him to come to the ER again yesterday. Patient denied any other complaints no chest pain, abdominal pain, denied any urinary complaints. At presentation to [facility] ER patient had /70, , RR 22, temperature 98.9, saturating 91% on room air. Patient was initially on couple of liters of O2 through nasal cannula but had increased work of breathing, oxygen saturation 89 to 90%, started on high flow nasal cannula. Lab work significant for sodium 130, potassium 3.2, CO2 34, creatinine 1.1, proBNP 386.3, troponin less than 0.010, WBC 16.4.  UA not suggestive of infection. Chest x-ray-increasing consolidation within the right mid and upper lung consistent with pneumonia. Patient received Decadron, antibiotics at [facility]. PAST MEDICAL HISTORY PAST SURGICAL HISTORY   Hypertension, hyperlipidemia, GERD appendectomy   FAMILY HISTORY SOCIAL HISTORY   Reviewed and noncontributory denies smoking, alcohol, illicit drug abuse   MEDICATIONS ALLERGIES   Famotidine, omeprazole, losartan/hydrochlorothiazide, atorvastatin   no known drug allergies.        PAST MEDICAL, SURGICAL, FAMILY, and SOCIAL HISTORY         Past Medical History:   Diagnosis Date    GERD (gastroesophageal reflux disease)     Hyperlipidemia     Hypertension      Past Surgical History:   Procedure Laterality Date    APPENDECTOMY       No family history on file.   Family Hx of HTN  Family Hx as reviewed above, otherwise non-contributory  Social History     Socioeconomic History    Marital status:      Spouse name: Not on file    Number of children: Not on file    Years of education: Not on file    Highest education level: Not on file   Occupational History    Not on file   Social Needs    Financial resource strain: Not on file    Food insecurity     Worry: Not on file     Inability: Not on file    Transportation needs     Medical: Not on file     Non-medical: Not on file   Tobacco Use    Smoking status: Never Smoker    Smokeless tobacco: Never Used   Substance and Sexual Activity    Alcohol use: Never     Frequency: Never    Drug use: Never    Sexual activity: Not on file   Lifestyle    Physical activity     Days per week: Not on file     Minutes per session: Not on file    Stress: Not on file   Relationships    Social connections     Talks on phone: Not on file     Gets together: Not on file     Attends Congregational service: Not on file     Active member of club or organization: Not on file     Attends meetings of clubs or organizations: Not on file     Relationship status: Not on file    Intimate partner violence     Fear of current or ex partner: Not on file     Emotionally abused: Not on file     Physically abused: Not on file     Forced sexual activity: Not on file   Other Topics Concern    Not on file   Social History Narrative    Not on file       MEDICATIONS   Medications Prior to Admission  Medications Prior to Admission: famotidine (PEPCID) 40 MG tablet, Take 40 mg by mouth daily  omeprazole 20 MG EC tablet, Take 20 mg by mouth daily  amoxicillin-clavulanate (AUGMENTIN) 875-125 MG per tablet, Take 1 tablet by mouth 2 times daily for 10 days  azithromycin (ZITHROMAX) 250 MG tablet, Take 1 tablet by mouth See Admin Instructions for 5 days 500mg on day 1 followed by 250mg on days 2 - 5  losartan-hydrochlorothiazide (HYZAAR) 100-25 MG per tablet, Take 1 tablet by mouth daily  atorvastatin (LIPITOR) 40 MG tablet, Take 40 mg by mouth daily    Current Medications  Current Facility-Administered Medications   Medication Dose Route Frequency Provider Last Rate Last Dose    sodium chloride flush 0.9 % injection 10 mL  10 mL Intravenous 2 times per day Gela Beavers MD        sodium chloride flush 0.9 % injection 10 mL  10 mL Intravenous PRN Gela Beavers MD        acetaminophen (TYLENOL) tablet 650 mg  650 mg Oral Q6H PRN Gela Beavers MD        Or   [facility] acetaminophen (TYLENOL) suppository 650 mg  650 mg Rectal Q6H PRN Gela Beavers MD        polyethylene glycol (GLYCOLAX) packet 17 g  17 g Oral Daily PRN Gela Beavers MD        promethazine (PHENERGAN) tablet 12.5 mg  12.5 mg Oral Q6H PRN Gela Beavers MD        Or    ondansetron (ZOFRAN) injection 4 mg  4 mg Intravenous Q6H PRN Gela Beavers MD        enoxaparin (LOVENOX) injection 40 mg  40 mg Subcutaneous Daily Gela Beavers MD        dexamethasone (DECADRON) tablet 6 mg  6 mg Oral Daily Gela Beavers MD        guaiFENesin-dextromethorphan (ROBITUSSIN DM) 100-10 MG/5ML syrup 5 mL  5 mL Oral Q4H PRN Gela Beavers MD        Vitamin D (CHOLECALCIFEROL) tablet 2,000 Units  2,000 Units Oral Daily Gela Beavers MD        cefTRIAXone (ROCEPHIN) 1 g IVPB in 50 mL D5W minibag  1 g Intravenous Q24H Gela Beavers MD        azithromycin (ZITHROMAX) 500 mg in dextrose 5 % 250 mL IVPB  500 mg Intravenous Q24H Gela Beavers MD             Allergies  No Known Allergies    REVIEW OF SYSTEMS   Within above limitations. 14 point review of systems reviewed. Pertinent positive or negative as per HPI or otherwise negative per 14 point systems review.       PHYSICAL EXAM     Wt Readings from Last 3 Encounters:   11/22/20 215 lb (97.5 kg)   11/18/20 215 lb (97.5 kg) 06/19/17 225 lb (102.1 kg)       There were no vitals taken for this visit. GEN  -Awake, alert, NAD.   EYES   -PERRL. HENT  -MM are moist.   RESP  -LS CTA equal bilat, no wheezes, rales or rhonchi. C/V  -S1/S2 auscultated, tachycardia without appreciable M/R/G. No peripheral edema. MS  -B/L extremities - No gross joint deformities. No swelling, intact sensation symmetrical.   SKIN  -Normal coloration, warm, dry. NEURO  -CN 2-12 appear grossly intact, normal speech, no lateralizing weakness. PSYC  -Awake, alert, oriented x 3. Appropriate affect. LABS AND IMAGING     Results for Marcia Osman (MRN [id number]) as of 11/23/2020 04:37   Ref.  Range 11/22/2020 17:15   Sodium Latest Ref Range: 135 - 145 MMOL/L 130 (L)   Potassium Latest Ref Range: 3.5 - 5.1 MMOL/L 3.2 (L)   Chloride Latest Ref Range: 99 - 110 mMol/L 91 (L)   CO2 Latest Ref Range: 21 - 32 MMOL/L 34 (H)   BUN Latest Ref Range: 6 - 23 MG/DL 23   Creatinine Latest Ref Range: 0.9 - 1.3 MG/DL 1.1   Anion Gap Latest Ref Range: 4 - 16  5   GFR Non- Latest Ref Range: >60 mL/min/1.73m2 >60   GFR African American Latest Ref Range: >60 mL/min/1.73m2 >60   Magnesium Latest Ref Range: 1.8 - 2.4 mg/dl 2.3   Lactic Acid, Sepsis Latest Ref Range: 0.5 - 1.9 mMOL/L 1.9   Glucose Latest Ref Range: 70 - 99 MG/ (H)   Calcium Latest Ref Range: 8.3 - 10.6 MG/DL 8.0 (L)   Total Protein Latest Ref Range: 6.4 - 8.2 GM/DL 6.8   Pro-BNP Latest Ref Range: <300 PG/.3 (H)   Troponin T Latest Ref Range: <0.01 NG/ML <0.010   Albumin Latest Ref Range: 3.4 - 5.0 GM/DL 3.2 (L)   Alk Phos Latest Ref Range: 40 - 129 IU/L 186 (H)   ALT Latest Ref Range: 10 - 40 U/L 35   AST Latest Ref Range: 15 - 37 IU/L 54 (H)   Bilirubin Latest Ref Range: 0.0 - 1.0 MG/DL 1.0   WBC Latest Ref Range: 4.0 - 10.5 K/CU MM 16.4 (H)   RBC Latest Ref Range: 4.6 - 6.2 M/CU MM 5.09   Hemoglobin Quant Latest Ref Range: 13.5 - 18.0 GM/DL 15.7   Hematocrit Latest Ref Range: 42 - 52 % 46.0   MCV Latest Ref Range: 78 - 100 FL 90.4   MCH Latest Ref Range: 27 - 31 PG 30.8   MCHC Latest Ref Range: 32.0 - 36.0 % 34.1   MPV Latest Ref Range: 7.5 - 11.1 FL 8.7   RDW Latest Ref Range: 11.7 - 14.9 % 12.4   Platelet Count Latest Ref Range: 140 - 440 K/CU    Lymphocyte % Latest Ref Range: 24 - 44 % 1.5 (L)   Monocytes % Latest Ref Range: 0 - 4 % 2.3   Eosinophils % Latest Ref Range: 0 - 3 % 0.1   Basophils % Latest Ref Range: 0 - 1 % 0.2   Lymphocytes Absolute Latest Units: K/CU MM 0.2   Monocytes Absolute Latest Units: K/CU MM 0.4   Eosinophils Absolute Latest Units: K/CU MM 0.0   Basophils Absolute Latest Units: K/CU MM 0.0   Differential Type Unknown AUTOMATED DIFFERENTIAL   Segs Relative Latest Ref Range: 36 - 66 % 94.6 (H)   Segs Absolute Latest Units: K/CU MM 15.5   Immature Neutrophil % Latest Ref Range: 0 - 0.43 % 1.3 (H)   Total Immature Neutrophil Latest Units: K/CU MM 0.21     Results for Porsha Tamayo (MRN [id number]) as of 11/23/2020 04:37   Ref.  Range 11/22/2020 15:30   Color, UA Latest Ref Range: YELLOW  YELLOW   Clarity, UA Latest Ref Range: CLEAR  SL HAZY   Bilirubin, Urine Latest Ref Range: NEGATIVE MG/DL NEGATIVE   Ketones, Urine Latest Ref Range: NEGATIVE MG/DL TRACE   Specific Gravity, UA Latest Ref Range: 1.001 - 1.035  1.020   Blood, Urine Latest Ref Range: NEGATIVE  SMALL   Protein, UA Latest Ref Range: NEGATIVE MG/ (A)   Urobilinogen, Urine Latest Ref Range: 0.2 - 1.0 MG/DL 0.2   Leukocyte Esterase, Urine Latest Ref Range: NEGATIVE  NEGATIVE   Glucose, Urine Latest Ref Range: NEGATIVE MG/DL NEGATIVE   Nitrite Urine, Quantitative Latest Ref Range: NEGATIVE  NEGATIVE   pH, Urine Latest Ref Range: 5.0 - 8.0  5.0   Cast Type Latest Ref Range: NO CAST FORMS SEEN /HPF NEGATIVE (A)   WBC, UA Latest Ref Range: 0 - 2 /HPF NEGATIVE   RBC, UA Latest Ref Range: 0 - 3 /HPF 2 TO 3   Epithelial Cells, UA Latest Units: /HPF 0 TO 1   Bacteria, UA Latest Ref Range: NEGATIVE /HPF FEW   Crystal Type Latest Ref Range: NEGATIVE /HPF NEGATIVE     Recent Imaging    XR CHEST PORTABLE    Narrative    EXAMINATION:    ONE XRAY VIEW OF THE CHEST         11/22/2020 4:03 pm         COMPARISON:    11/18/2020         HISTORY:    ORDERING SYSTEM PROVIDED HISTORY: hypoxia, covid, shortness of breath    TECHNOLOGIST PROVIDED HISTORY:    Reason for exam:->hypoxia, covid, shortness of breath    Reason for Exam: hypoxia, covid, shortness of breath    Acuity: Acute    Type of Exam: Initial         FINDINGS:    Cardiomediastinal silhouette is unchanged in size.  There is increasing    opacity within the right mid and upper lung.  No large pleural effusion or    pneumothorax.  No acute osseous abnormality.              Impression    Increasing consolidation within the right mid and upper lung, consistent with    pneumonia. Relevant labs and imaging reviewed    ASSESSMENT AND PLAN     #. Acute respiratory failure with hypoxia secondary to Covid:  -Patient was initially on couple of liters of oxygen.  -Currently patient is on Vapotherm-40 L of oxygen, 100% saturating 96%. -VBG ordered.  -Wean down oxygen as tolerated. -Dexamethasone ordered. #.  COVID-19 pneumonia:  -Patient was on Augmentin, azithromycin. Continue to have worsening of symptoms.  -We will start on broad-spectrum antibiotics-vancomycin, cefepime.  -Inflammatory markers ordered.  -Convalescent plasma ordered.  -Patient may not be eligible for remdesivir due to being on Vapotherm. #.  Superimposed bacterial pneumonia cannot be ruled out  -Continue vancomycin, cefepime.  -Urine Streptococcus pneumonia, Legionella antigen ordered. #.  Hyponatremia &  Hypokalemia  -Replace    #. Hypertension:  -Continue losartan, hold hydrochlorthiazide due to hyponatremia and hypokalemia    #. Hyperlipidemia: Continue atorvastatin    #. GERD: Continue Protonix. DVT Prophylaxis: Lovenox  GI Prophylaxis: Protonix  Code Status: FULL.   Discussed

## 2020-11-23 NOTE — PROGRESS NOTES
[address] [facility]  consulted by Dr. Rosetta Genao for monitoring and adjustment. Indication for treatment: CAP  Goal trough: 15 mcg/mL     Pertinent Laboratory Values:   Temp Readings from Last 3 Encounters:   11/23/20 98.5 °F (36.9 °C) (Oral)   11/22/20 98.9 °F (37.2 °C)   11/18/20 98.9 °F (37.2 °C) (Oral)     Recent Labs     11/22/20  1715   WBC 16.4*     Recent Labs     11/22/20  1715   BUN 23   CREATININE 1.1     Estimated Creatinine Clearance: 68 mL/min (based on SCr of 1.1 mg/dL). No intake or output data in the 24 hours ending 11/23/20 7316    Pertinent Cultures:  Date    Source    Results  11/18                          COVID-19                               Detected  11/22   Blood    Pending  11/23   Legionella   Pending  11/23                          Strep Pneumo                         Pending  11/23                          Sputum                                    Pending  11/23                          Nasal MRSA Screen               Pending    Assessment:  WBC elevated at 16.4; Afebrile  SCr (1.1) WNL, BUN (23) WNL; No I/O data  Day(s) of therapy: 1  Vancomycin level:   11/25 - To be drawn    Plan:  Vancomycin 2,000 mg IV initial dose; follow with Vancomycin 1,500 mg IV Q 18 Hours  Check Vancomycin trough prior to fourth dose  Pharmacy will continue to monitor patient and adjust therapy as indicated    VANCOMYCIN TROUGH SCHEDULED FOR 11/25/2020 @ 11:30 AM    Thank you for the consult.   Angelita Reyna [place]  11/23/2020 6:21 AM

## 2020-11-24 NOTE — PROGRESS NOTES
11/23/20 1167   Oxygen Therapy/Pulse Ox   O2 Therapy Oxygen humidified   O2 Device Heated high flow cannula   O2 Flow Rate (L/min) 40 L/min   FiO2  90 %   Resp (!) 32   Humidification Source Heated wire   Humidification Temp 33

## 2020-11-24 NOTE — PROGRESS NOTES
[address] [facility]  consulted by Dr. Lucio Veliz for monitoring and adjustment. Indication for treatment: COVID 19 pneumonia, possible secondary bacterial infection  Goal trough: 15 mcg/mL     Pertinent Laboratory Values:   Temp Readings from Last 3 Encounters:   11/24/20 99 °F (37.2 °C) (Oral)   11/22/20 98.9 °F (37.2 °C)   11/18/20 98.9 °F (37.2 °C) (Oral)     Recent Labs     11/22/20  1715 11/23/20  0430 11/24/20  0340   WBC 16.4* 15.2* 20.2*     Recent Labs     11/22/20  1715 11/23/20  0430 11/24/20  0340   BUN 23 23 28*   CREATININE 1.1 0.9 1.0     Estimated Creatinine Clearance: 76 mL/min (based on SCr of 1 mg/dL). Intake/Output Summary (Last 24 hours) at 11/24/2020 1309  Last data filed at 11/24/2020 0259  Gross per 24 hour   Intake 933.33 ml   Output 975 ml   Net -41.67 ml       Pertinent Cultures:  Date    Source    Results  11/18                          COVID-19                               Detected  11/23   Legionella   Negative  11/23                          Strep Pneumo                         Pending  11/23                          Sputum                                    Pending  11/23                          Nasal MRSA Screen               Pending    VANCOMYCIN TROUGH:  No results for input(s): VANCOTROUGH in the last 72 hours. VANCOMYCIN RANDOM:  No results for input(s): VANCORANDOM in the last 72 hours. Assessment:  · Elevated WBC (receiving dexamethasone) afebrile  · Renal function stable   · Day(s) of therapy: 2  · Vancomycin level: to be collected    Plan:  · Vancomycin 2,000 mg x1 followed by 1,500 mg q18h  · Check Vancomycin trough prior to fourth dose  · Pharmacy will continue to monitor patient and adjust therapy as indicated    VANCOMYCIN TROUGH SCHEDULED FOR 11/25/2020 @ 11:30 AM    Thank you for the consult.   Ishan Cervantes, [address]  11/24/2020 1:09 PM

## 2020-11-24 NOTE — CARE COORDINATION
Patient admitted to COVID unit and per medical record review is from home with his wife. Patient has PCP and insurance to assist with RX coverage . Patient currently on Vapotherm. Case Management to follow to assist with any potential discharge needs.

## 2020-11-24 NOTE — PROGRESS NOTES
11/24/20 0244   Oxygen Therapy/Pulse Ox   O2 Therapy Oxygen humidified   O2 Device Heated high flow cannula   O2 Flow Rate (L/min) 40 L/min   FiO2  100 %   Resp (!) 37   SpO2 95 %

## 2020-11-24 NOTE — PROGRESS NOTES
Hospitalist Progress Note      Name:  Donna Luis /Age/Sex: 1945  (76 y.o. male)   MRN & CSN:  [id number] & [id number] Admission Date/Time: 2020  3:03 AM   Location:  -A PCP: Roma Ram MD         Hospital Day: 2    History of Present Illness:     Chief Complaint: Donna Luis is a 76 y.o.  male  who presents with SOB     The patient seen and examined at bed side. He says having SOB, slightly appears to be confused. Ten point ROS reviewed negative, unless as noted above    Objective: Intake/Output Summary (Last 24 hours) at 2020 1342  Last data filed at 2020 0259  Gross per 24 hour   Intake 933.33 ml   Output 975 ml   Net -41.67 ml      Vitals:   Vitals:    20 1138   BP:    Pulse:    Resp: 23   Temp:    SpO2: 91%     Physical Exam:   General Appearance: alert and oriented. Confused off and on  Cardiovascular: normal rate, regular rhythm, normal S1 and S2  Pulmonary/Chest: Decreased breath sounds bilaterally with ronchi  Abdomen: soft, non-tender, non-distended, normal bowel sounds, no masses   Extremities: no cyanosis, clubbing or edema, pulse   Skin: warm and dry, no rash or erythema  Head: normocephalic and atraumatic  Eyes: pupils equal, round, and reactive to light  Neck: supple and non-tender without mass, no thyromegaly   Musculoskeletal: normal range of motion, no joint swelling, deformity or tenderness  Neurological: alert, oriented, confused.  No focal deficits    Medications:   Medications:    remdesivir IVPB  200 mg Intravenous Once    Followed by   Alex Gutierrez ON 2020] remdesivir IVPB  100 mg Intravenous Q24H    sodium chloride flush  10 mL Intravenous 2 times per day    enoxaparin  40 mg Subcutaneous Daily    dexamethasone  6 mg Oral Daily    vitamin D  2,000 Units Oral Daily    cefepime  2 g Intravenous Q8H    atorvastatin  40 mg Oral Daily    pantoprazole  40 mg Oral QAM AC    losartan  100 mg Oral Daily    vancomycin 1,500 mg Intravenous Q18H      Infusions:   PRN Meds: sodium chloride, 30 mL, PRN  sodium chloride flush, 10 mL, PRN  acetaminophen, 650 mg, Q6H PRN    Or  acetaminophen, 650 mg, Q6H PRN  polyethylene glycol, 17 g, Daily PRN  promethazine, 12.5 mg, Q6H PRN    Or  ondansetron, 4 mg, Q6H PRN  guaiFENesin-dextromethorphan, 5 mL, Q4H PRN            Pertinent New Labs & Imaging Studies     CBC with Differential:    Lab Results   Component Value Date    WBC 20.2 11/24/2020    RBC 4.43 11/24/2020    HGB 13.5 11/24/2020    HCT 41.0 11/24/2020     11/24/2020    MCV 92.6 11/24/2020    MCH 30.5 11/24/2020    MCHC 32.9 11/24/2020    RDW 12.4 11/24/2020    SEGSPCT 88.9 11/24/2020    BANDSPCT 17 11/23/2020    LYMPHOPCT 2.2 11/24/2020    MONOPCT 6.4 11/24/2020    BASOPCT 0.2 11/24/2020    MONOSABS 1.3 11/24/2020    LYMPHSABS 0.4 11/24/2020    EOSABS 0.0 11/24/2020    BASOSABS 0.0 11/24/2020    DIFFTYPE AUTOMATED DIFFERENTIAL 11/24/2020     CMP:    Lab Results   Component Value Date     11/24/2020    K 4.5 11/24/2020    CL 93 11/24/2020    CO2 25 11/24/2020    BUN 28 11/24/2020    CREATININE 1.0 11/24/2020    GFRAA >60 11/24/2020    LABGLOM >60 11/24/2020    GLUCOSE 129 11/24/2020    PROT 6.3 11/24/2020    LABALBU 3.3 11/24/2020    CALCIUM 7.8 11/24/2020    BILITOT 0.5 11/24/2020    ALKPHOS 132 11/24/2020    AST 91 11/24/2020    ALT 47 11/24/2020     No results found.     Assessment and Plan:   Germán Mobley is a 76 y.o.  male  who presents with SOB    Acute respiratory failure with hypoxia  COVID-19 pneumonia  Likely superimposed bacterial pneumonia  Currently patient is on Vapotherm, wean off as tolerated  Chest X ray with consolidation within the right mid and upper lung  Continue Dexamethasone, Remdesivir  S/p Convalescent plasma  Continue monitoring inflammatory markers  Continue empiric Cefepime and Vancomycin  Urine Legionella and Strep ag negative  MRSA screening pending    Hyponatreia and Hypokalemia  Replaced    Hypertension  Currently blood pressure, holding medications    Diet DIET LOW SODIUM 2 GM;   DVT Prophylaxis [x] Lovenox, []  Heparin, [] SCDs, [] Ambulation   GI Prophylaxis [x] PPI,  [] H2 Blocker,  [] Carafate,  [] Diet/Tube Feeds   Code Status Full Code   Disposition Patient requires continued admission due to COVID 19 PNA   MDM [] Low, [x] Moderate,[]  High     Electronically signed by Elian Wilcox MD on 11/24/2020 at 1:42 PM

## 2020-11-25 NOTE — PROGRESS NOTES
Hospitalist Progress Note      Name:  Brina Josue /Age/Sex: 1945  (76 y.o. male)   MRN & CSN:  [id number] & [id number] Admission Date/Time: 2020  3:03 AM   Location:  -A PCP: John Patel MD         Hospital Day: 3    History of Present Illness:     Chief Complaint: Brina Josue is a 76 y.o.  male  who presents with SOB     The patient seen and examined at bed side. He says having SOB, slightly appears to be confused. Ten point ROS reviewed negative, unless as noted above    Objective: Intake/Output Summary (Last 24 hours) at 2020 1409  Last data filed at 2020 0931  Gross per 24 hour   Intake 240 ml   Output 975 ml   Net -735 ml      Vitals:   Vitals:    20 1049   BP:    Pulse:    Resp: 24   Temp:    SpO2:      Physical Exam:   General Appearance: alert and oriented. Confused off and on  Cardiovascular: normal rate, regular rhythm, normal S1 and S2  Pulmonary/Chest: Decreased breath sounds bilaterally with ronchi  Abdomen: soft, non-tender, non-distended, normal bowel sounds, no masses   Extremities: no cyanosis, clubbing or edema, pulse   Skin: warm and dry, no rash or erythema  Head: normocephalic and atraumatic  Eyes: pupils equal, round, and reactive to light  Neck: supple and non-tender without mass, no thyromegaly   Musculoskeletal: normal range of motion, no joint swelling, deformity or tenderness  Neurological: alert, oriented, confused.  No focal deficits    Medications:   Medications:    methylPREDNISolone  40 mg Intravenous Daily    lidocaine PF  5 mL Intradermal Once    sodium chloride flush  10 mL Intravenous 2 times per day    [START ON 2020] vancomycin  1,500 mg Intravenous Q12H    remdesivir IVPB  100 mg Intravenous Q24H    lidocaine PF  5 mL Intradermal See Admin Instructions    sodium chloride flush  10 mL Intravenous 2 times per day    sodium chloride flush  10 mL Intravenous 2 times per day    enoxaparin  40 mg Subcutaneous Daily    vitamin D  2,000 Units Oral Daily    cefepime  2 g Intravenous Q8H    atorvastatin  40 mg Oral Daily    pantoprazole  40 mg Oral QAM AC    losartan  100 mg Oral Daily      Infusions:   PRN Meds: sodium chloride flush, 10 mL, PRN  hydrOXYzine, 50 mg, Q6H PRN  sodium chloride, 30 mL, PRN  sodium chloride flush, 10 mL, PRN  sodium chloride flush, 10 mL, PRN  acetaminophen, 650 mg, Q6H PRN    Or  acetaminophen, 650 mg, Q6H PRN  polyethylene glycol, 17 g, Daily PRN  promethazine, 12.5 mg, Q6H PRN    Or  ondansetron, 4 mg, Q6H PRN  guaiFENesin-dextromethorphan, 5 mL, Q4H PRN            Pertinent New Labs & Imaging Studies     CBC with Differential:    Lab Results   Component Value Date    WBC 22.6 11/25/2020    RBC 4.98 11/25/2020    HGB 15.2 11/25/2020    HCT 46.3 11/25/2020     11/25/2020    MCV 93.0 11/25/2020    MCH 30.5 11/25/2020    MCHC 32.8 11/25/2020    RDW 13.0 11/25/2020    SEGSPCT 79.0 11/25/2020    BANDSPCT 12 11/25/2020    LYMPHOPCT 2.0 11/25/2020    PROMYELOPCT 1 11/25/2020    MONOPCT 5.0 11/25/2020    MYELOPCT 1 11/25/2020    BASOPCT 0.2 11/24/2020    MONOSABS 1.1 11/25/2020    LYMPHSABS 0.5 11/25/2020    EOSABS 0.0 11/24/2020    BASOSABS 0.0 11/24/2020    DIFFTYPE AUTOMATED DIFFERENTIAL 11/25/2020     CMP:    Lab Results   Component Value Date     11/25/2020    K 3.9 11/25/2020    CL 98 11/25/2020    CO2 25 11/25/2020    BUN 33 11/25/2020    CREATININE 1.0 11/25/2020    GFRAA >60 11/25/2020    LABGLOM >60 11/25/2020    GLUCOSE 119 11/25/2020    PROT 6.1 11/25/2020    LABALBU 3.1 11/25/2020    CALCIUM 7.8 11/25/2020    BILITOT 0.5 11/25/2020    ALKPHOS 127 11/25/2020     11/25/2020    ALT 68 11/25/2020     No results found.     Assessment and Plan:   Karel Lopes is a 76 y.o.  male  who presents with SOB    Acute respiratory failure with hypoxia  COVID-19 pneumonia  Likely superimposed bacterial pneumonia  Currently patient is on BIPAP  Chest X ray with consolidation within the right mid and upper lung  Dexamethasone>>changed to Soulmedrol  Continue Remdesivir  S/p Convalescent plasma  Continue monitoring inflammatory markers  Continue empiric Cefepime and Vancomycin  Urine Legionella and Strep ag negative  MRSA screening pending  Pulmonary consulted, appreciated recommendations    Hyponatreia and Hypokalemia  Replaced    Hypertension  Currently blood pressure, holding medications    Diet DIET LOW SODIUM 2 GM;   DVT Prophylaxis [x] Lovenox, []  Heparin, [] SCDs, [] Ambulation   GI Prophylaxis [x] PPI,  [] H2 Blocker,  [] Carafate,  [] Diet/Tube Feeds   Code Status Full Code   Disposition Patient requires continued admission due to COVID 19 PNA   MDM [] Low, [x] Moderate,[]  High     Electronically signed by Elian Wilcox MD on 11/25/2020 at 2:09 PM

## 2020-11-25 NOTE — PROGRESS NOTES
Physician Progress Note      Edouard Ortiz  CSN #:                  [id number]  :                       1945  ADMIT DATE:       2020 3:03 AM  DISCH DATE:  RESPONDING  PROVIDER #:        Sara Merlos MD          QUERY TEXT:    Hospitalists,    Pt admitted with COVID PNA & possible bacteria. If possible, please document   in the progress notes and discharge summary if you are evaluating and /or   treating any of the following: The medical record reflects the following:  Risk Factors: COVID  Clinical Indicators: COVID PNA, possible bacterial PNA, acute resp. failure,   WBC 15.2-20.2, procalcitonin 3.15, CRP >300   RR 21-43, BP 96-/1-154/57, O sat   83-69%,  Treatment: IV Zithromax, Rocephin, Vancomyycin & Maxipime    Thank you,  Yara Ross RN CDS  [phone]  Options provided:  -- Sepsis, present on admission  -- Sepsis, present on admission, now resolved, ***  -- Sepsis, not present on admission,  -- Sepsis was ruled out  -- Other - I will add my own diagnosis  -- Disagree - Not applicable / Not valid  -- Disagree - Clinically unable to determine / Unknown  -- Refer to Clinical Documentation Reviewer    PROVIDER RESPONSE TEXT:    This patient has sepsis which was present on admission.     Query created by: Marie Melendez on 2020 1:13 PM      Electronically signed by:  Sara Merlos MD 2020 1:54 PM

## 2020-11-25 NOTE — CONSULTS
Consult completed. Procedure/rationale explained to pt including benefits vs potential risks/complications; questions answered & consent obtained. #5Fr Triple Lumen PowerPICC HF initiated to RUE Basilic Vein using sterile, UltraSound-guided technique without difficulty/complications. Positioning verified via TPS & 3CG with appropriate P-wave changes noted. Sterile dressing with SkinPrep, StatLock Securing Device, BioPatch, SwabCaps, and Limb Precautions band applied. Pt tolerated well & denies other c/o or needs. Primary RN notified.

## 2020-11-25 NOTE — CONSULTS
Pulmonary Consult Note      Reason for Consult: COVID Pneumonia  Requesting Physician: Dr. Keith Ansari  Subjective:   CHIEF COMPLAINT :SOB    Patient Active Problem List    Diagnosis Date Noted    Acute respiratory failure with hypoxia ([facility] [address] 11/22/2020    KISHORE on CPAP 06/19/2017        HPI:                The patient is a 76 y.o. male with significant past medical history of hypertension, hyperlipidemia, GERD  presents with complaints of SOB and cough. He has been diagnosed with COVID on 11/18/2020. He was discharged but came back with the worsening SOB. His CXR showed bilateral pneumonia. He was desaturating to 87%. He had no fever, no headache, no n/v, no abd pain, no diarrhea, no dysuria. He is on abx,Solumderol, Remdesivir. He is in mild to moderate resp distress    Past Medical History:      Diagnosis Date    GERD (gastroesophageal reflux disease)     Hyperlipidemia     Hypertension       Past Surgical History:        Procedure Laterality Date    APPENDECTOMY       Current Medications:     methylPREDNISolone  40 mg Intravenous Daily    remdesivir IVPB  100 mg Intravenous Q24H    lidocaine PF  5 mL Intradermal See Admin Instructions    sodium chloride flush  10 mL Intravenous 2 times per day    sodium chloride flush  10 mL Intravenous 2 times per day    enoxaparin  40 mg Subcutaneous Daily    vitamin D  2,000 Units Oral Daily    cefepime  2 g Intravenous Q8H    atorvastatin  40 mg Oral Daily    pantoprazole  40 mg Oral QAM AC    losartan  100 mg Oral Daily    vancomycin  1,500 mg Intravenous Q18H     Allergies:    Social History:    TOBACCO:   reports that he has never smoked. He has never used smokeless tobacco.  ETOH:   reports no history of alcohol use. Patient currently lives independently    Family History:   No family history on file.     REVIEW OF SYSTEMS:    CONSTITUTIONAL:  negative for fevers, chills, diaphoresis, activity change, appetite change, fatigue, night sweats and unexpected weight change. EYES:  negative for blurred vision, eye discharge, visual disturbance and icterus  HEENT:  negative for hearing loss, tinnitus, ear drainage, sinus pressure, nasal congestion, epistaxis and snoring  RESPIRATORY:  See HPI  CARDIOVASCULAR:  negative for chest pain, palpitations, exertional chest pressure/discomfort, edema, syncope  GASTROINTESTINAL:  negative for nausea, vomiting, diarrhea, constipation, blood in stool and abdominal pain  GENITOURINARY:  negative for frequency, dysuria, urinary incontinence, decreased urine volume, and hematuria  HEMATOLOGIC/LYMPHATIC:  negative for easy bruising, bleeding and lymphadenopathy  ALLERGIC/IMMUNOLOGIC:  negative for recurrent infections, angioedema, anaphylaxis and drug reactions  ENDOCRINE:  negative for weight changes and diabetic symptoms including polyuria, polydipsia and polyphagia  MUSCULOSKELETAL:  negative for  pain, joint swelling, decreased range of motion and muscle weakness  NEUROLOGICAL:  negative for headaches, slurred speech, unilateral weakness  PSYCHIATRIC/BEHAVIORAL: negative for hallucinations, behavioral problems, confusion and agitation.      Objective:   PHYSICAL EXAM:      VITALS:  BP (!) 161/85   Pulse 93   Temp 98.2 °F (36.8 °C) (Oral)   Resp 24   Ht 5' 11\" (1.803 m)   Wt 215 lb 9.8 oz (97.8 kg)   SpO2 90%   BMI 30.07 kg/m²   24HR INTAKE/OUTPUT:      Intake/Output Summary (Last 24 hours) at 2020 1119  Last data filed at 2020 0931  Gross per 24 hour   Intake 360 ml   Output 1175 ml   Net -815 ml     CURRENT PULSE OXIMETRY:  SpO2: 90 %  24HR PULSE OXIMETRY RANGE:  SpO2  Av.1 %  Min: 81 %  Max: 92 %    CONSTITUTIONAL:  awake, alert, cooperative, no apparent distress, and appears stated age  NECK:  Supple, symmetrical, trachea midline, no adenopathy, thyroid symmetric, not enlarged and no tenderness, skin normal  LUNGS: Bilateral basal crackles  CARDIOVASCULAR:  normal S1 and S2, no edema and no JVD  ABDOMEN:  normal bowel sounds, non-distended and no masses palpated, and no tenderness to palpation. No hepatospleenomegaly  LYMPHADENOPATHY:  no axillary or supraclavicular adenopathy. No cervical adnenopathy  PSYCHIATRIC: Oriented to person place and time. No obvious depression or anxiety. MUSCULOSKELETAL: No obvious misalignment or effusion of the joints. No clubbing, cyanosis of the digits. SKIN:  normal skin color, texture, turgor and no redness, warmth, or swelling. No palpable nodules    DATA:    Old records have been reviewed  CBC with Differential:    Lab Results   Component Value Date    WBC 22.6 11/25/2020    RBC 4.98 11/25/2020    HGB 15.2 11/25/2020    HCT 46.3 11/25/2020     11/25/2020    MCV 93.0 11/25/2020    MCH 30.5 11/25/2020    MCHC 32.8 11/25/2020    RDW 13.0 11/25/2020    SEGSPCT 79.0 11/25/2020    BANDSPCT 12 11/25/2020    LYMPHOPCT 2.0 11/25/2020    PROMYELOPCT 1 11/25/2020    MONOPCT 5.0 11/25/2020    MYELOPCT 1 11/25/2020    BASOPCT 0.2 11/24/2020    MONOSABS 1.1 11/25/2020    LYMPHSABS 0.5 11/25/2020    EOSABS 0.0 11/24/2020    BASOSABS 0.0 11/24/2020    DIFFTYPE AUTOMATED DIFFERENTIAL 11/25/2020     BMP:    Lab Results   Component Value Date     11/25/2020    K 3.9 11/25/2020    CL 98 11/25/2020    CO2 25 11/25/2020    BUN 33 11/25/2020    CREATININE 1.0 11/25/2020    CALCIUM 7.8 11/25/2020    GFRAA >60 11/25/2020    LABGLOM >60 11/25/2020    GLUCOSE 119 11/25/2020     Hepatic Function Panel:    Lab Results   Component Value Date    ALKPHOS 127 11/25/2020    ALT 68 11/25/2020     11/25/2020    PROT 6.1 11/25/2020    BILITOT 0.5 11/25/2020     ABG:    Lab Results   Component Value Date    BLP4ZBU 25.2 11/25/2020    HWD7COK 38.0 11/25/2020    PO2ART 53 11/25/2020       Cultures:   Blood Culture:    Sputum Culture:        Radiology Review:      Increasing consolidation within the right mid and upper lung, consistent with    pneumonia.       Increasing consolidation within the right mid and upper lung, consistent with    pneumonia. 1. Large right upper lobe focus of ground-glass attenuation.  Imaging    features can be seen with COVID-19 pneumonia, though are nonspecific and can    occur with a variety of infectious and noninfectious processes. PneInd    2. Noncalcified right sided pulmonary nodules measuring up to 6 mm.  Please    see follow up recommendations below. 3. Coronary artery disease. 4. An enlarged 1.6 cm precarinal short axis lymph node is indeterminate but    may be reactive.  Attention on follow-up imaging           Assessment/Plan       Patient Active Problem List    Diagnosis Date Noted    Acute respiratory failure with hypoxia ([facility] [address] 11/22/2020    KISHORE on CPAP 06/19/2017   Bilateral Pneumonia  Acute hypoxic resp failure  Non Calcified right sided Pulmonary nodules  Leukocytosis  COVID Pneumonia      PLAN  1. Abx  2. F/u C&S  3. Keep sats > 92%  4. BIPAP  5. Solumderol  6. Remdesivir  7. DVT and GI Prophylaxis  8. CXR in am  9. C/w present management  10.  Will consider intubating if any worsening hypoxia    Electronically signed by Tish Ferrara MD on 11/25/2020 at 11:19 AM

## 2020-11-25 NOTE — PROGRESS NOTES
11/25/20 0312   NIV Type   NIV Started/Stopped On   Equipment Type v60   Mode Bilevel   Mask Type Full face mask   Mask Size Medium   Settings/Measurements   IPAP 15 cmH20   CPAP/EPAP 8 cmH2O   Resp 23   FiO2  100 %   I Time/ I Time % 1.25 s   Vt Exhaled 487 mL   Mask Leak (lpm) 60 lpm   Using Accessory Muscles Yes   Alarm Settings   Alarms On Y   Press Low Alarm 5 cmH2O   High Pressure Alarm 25 cmH2O   Delay Alarm 20 sec(s)   Apnea (secs) 20 secs   Resp Rate Low Alarm 12   High Respiratory Rate 50 br/min

## 2020-11-25 NOTE — PROGRESS NOTES
Pts work of breathing increasing throughout the day. Pt has multiple abx including vancomyocin and only a 22g PIV at this time. Orders replaced for PICC line as pts condition is worsening.      Electronically signed by Rodolfo Reynolds RN on 11/25/2020 at 12:59 PM

## 2020-11-25 NOTE — PROGRESS NOTES
[address] [facility]  consulted by Dr. Norene Schirmer for monitoring and adjustment. Indication for treatment: COVID 19 pneumonia, possible secondary bacterial infection  Goal trough: 15 mcg/mL     Pertinent Laboratory Values:   Temp Readings from Last 3 Encounters:   11/25/20 98.2 °F (36.8 °C) (Oral)   11/22/20 98.9 °F (37.2 °C)   11/18/20 98.9 °F (37.2 °C) (Oral)     Recent Labs     11/23/20  0430 11/24/20  0340 11/25/20  0320   WBC 15.2* 20.2* 22.6*     Recent Labs     11/23/20  0430 11/24/20  0340 11/25/20  0320   BUN 23 28* 33*   CREATININE 0.9 1.0 1.0     Estimated Creatinine Clearance: 76 mL/min (based on SCr of 1 mg/dL). Intake/Output Summary (Last 24 hours) at 11/25/2020 1319  Last data filed at 11/25/2020 0931  Gross per 24 hour   Intake 240 ml   Output 975 ml   Net -735 ml       Pertinent Cultures:  Date    Source    Results  11/18                          COVID-19                               Detected  11/23   Legionella   Negative  11/23                          Strep Pneumo                         Pending  11/23                          Sputum                                    Pending  11/23                          Nasal MRSA Screen               Pending    VANCOMYCIN TROUGH:    Recent Labs     11/25/20  1109   VANCOTROUGH 4.8*     VANCOMYCIN RANDOM:  No results for input(s): VANCORANDOM in the last 72 hours. Assessment:  · Elevated WBC (receiving dexamethasone) afebrile  · Renal function stable   · Day(s) of therapy: 3  · Vancomycin level: 4.8, sub-therapeutic     Plan:  · Vancomycin 2,000 mg x1 followed by 1,500 mg q18h with sub-therapeutic trough   · Increase dose to 1500 mg q12h   · Pharmacy will continue to monitor patient and adjust therapy as indicated    VANCOMYCIN TROUGH SCHEDULED FOR 11/27/2020 @ 11:30 AM    Thank you for the consult.   Luzma Snowden [facility]  11/25/2020 1:19 PM

## 2020-11-25 NOTE — PROGRESS NOTES
Pt status steadily declined throughout the day. Since beginning my shift this AM pt SpO2 remained in the 80s, with accessory muscle breathing. Intubation was discussed with patient at he was declining and pt was agreeable. Pt was on BiPAP at 100% FiO2 with settings per Dr. Avila Going orders. PRN anxiety medication was ordered to try and slow patients breathing and was given without any progress in patient condition. Around roughly 1700 pt stated he was ready to be intubated to help his breathing. Dr. Nicholas Marcelo was paged, orders were placed and CRNA was called to the bedside. Pt was intubated at this time. NG tube placed, barron catheter placed, and restraints placed at this time per Dr. Nicholas Marcelo. Dr. Nara Davis updated on patient condition. Pt's wife was updated at this time as well. Handoff given to MUNIR Alcantara.      Electronically signed by Ludy Marin RN on 11/25/2020 at 6:41 PM

## 2020-11-25 NOTE — CONSULTS
Pt has patent PIV documented in Flowsheets and Abx infusing per [facility]; therapeutic needs met at this time.

## 2020-11-25 NOTE — PROGRESS NOTES
Paged Dr. Laina Sandy, pt resp 40-50 bipap 15/8 100% FiO2 only sat 85%. Per Dr. Laina Sandy, increase bipap settings to 20/10 repeat ABG in 1 hour. [facility] RT and Counts include 234 beds at the [facility] notified.

## 2020-11-25 NOTE — PROGRESS NOTES
11/24/20 2246   NIV Type   $NIV $Daily Charge   Equipment Type v60   Mode Bilevel   Mask Type Full face mask   Mask Size Medium   Settings/Measurements   IPAP 14 cmH20   CPAP/EPAP 7 cmH2O   Resp 29   FiO2  100 %   Vt Exhaled 667 mL   Minute Volume 31.8 Liters   Mask Leak (lpm) 1 lpm   SpO2 96   Alarm Settings   Alarms On Y   Press Low Alarm 5 cmH2O   High Pressure Alarm 25 cmH2O   Delay Alarm 20 sec(s)   Apnea (secs) 20 secs   Resp Rate Low Alarm 12   High Respiratory Rate 50 br/min

## 2020-11-26 NOTE — PROGRESS NOTES
[address] [facility]  consulted by Dr. [place] Andrade for monitoring and adjustment. Indication for treatment: COVID 19 pneumonia, possible secondary bacterial infection  Goal trough: 15 mcg/mL     Pertinent Laboratory Values:   Temp Readings from Last 3 Encounters:   11/26/20 97.2 °F (36.2 °C) (Rectal)   11/22/20 98.9 °F (37.2 °C)   11/18/20 98.9 °F (37.2 °C) (Oral)     Recent Labs     11/24/20  0340 11/25/20  0320 11/26/20  0520   WBC 20.2* 22.6* 21.8*     Recent Labs     11/24/20  0340 11/25/20  0320 11/26/20  0520   BUN 28* 33* 58*   CREATININE 1.0 1.0 1.6*     Estimated Creatinine Clearance: 47 mL/min (A) (based on SCr of 1.6 mg/dL (H)). Intake/Output Summary (Last 24 hours) at 11/26/2020 1056  Last data filed at 11/26/2020 0600  Gross per 24 hour   Intake 1160.05 ml   Output 200 ml   Net 960.05 ml       Pertinent Cultures:  Date    Source    Results  11/18                          COVID-19                                Detected  11/23   Legionella   Negative  11/23                          Strep Pneumo                       Negative  11/23                          Sputum                                    Pending  11/23                          Nasal MRSA Screen               Pending    VANCOMYCIN TROUGH:    Recent Labs     11/25/20  1109   VANCOTROUGH 4.8*     VANCOMYCIN RANDOM:  No results for input(s): VANCORANDOM in the last 72 hours.     Assessment:  · Elevated WBC (receiving dexamethasone) afebrile  · Renal function changed, MICHELLE - BUN continues to trend up   · Day(s) of therapy: 4  · Vancomycin level: repeat to be collected     Plan:  · Started on vancomycin 2000 mg x 1 followed by 1500 mg q18h with low trough   · Dose increased to 1500 mg q12h, however patient has developed MICHELLE   · Hold further dosing   · Random level tomorrow AM (>24h post-dose)   · Pharmacy will continue to monitor patient and adjust therapy as indicated    VANCOMYCIN TROUGH SCHEDULED FOR 11/27/2020 @ 0600    Thank you for the consult.   Beau Daley [facility]  11/26/2020 10:56 AM

## 2020-11-26 NOTE — PROGRESS NOTES
Pulmonary and Critical Care  Progress Note      VITALS:  /72   Pulse 75   Temp 97.2 °F (36.2 °C) (Rectal)   Resp 26   Ht 5' 11\" (1.803 m)   Wt 212 lb 8.4 oz (96.4 kg)   SpO2 93%   BMI 29.64 kg/m²     Subjective:   CHIEF COMPLAINT :SOB     HPI:                The patient is on the vent and sedated. He was intubated for the worsening SOB    Objective:   PHYSICAL EXAM:    LUNGS:Bilateral basal crackles  Abd-soft, BS+,NT  Ext -No pedal edema  CVS-s1s2, no murmurs      DATA:    CBC:  Recent Labs     11/24/20  0340 11/25/20  0320 11/26/20  0520   WBC 20.2* 22.6* 21.8*   RBC 4.43* 4.98 4.20*   HGB 13.5 15.2 12.8*   HCT 41.0* 46.3 40.9*    264 210   MCV 92.6 93.0 97.4   MCH 30.5 30.5 30.5   MCHC 32.9 32.8 31.3*   RDW 12.4 13.0 13.5   SEGSPCT 88.9* 79.0*  --    BANDSPCT  --  12*  --       BMP:  Recent Labs     11/24/20  0340 11/25/20  0320 11/26/20  0520   * 138 139   K 4.5 3.9 4.6   CL 93* 98* 101   CO2 25 25 26   BUN 28* 33* 58*   CREATININE 1.0 1.0 1.6*   CALCIUM 7.8* 7.8* 7.7*   GLUCOSE 129* 119* 159*      ABG:  Recent Labs     11/25/20  0945 11/25/20  1930 11/26/20  0600   PH 7.43 7.25* 7.31*   PO2ART 53* 104* 84   CPH0JJZ 38.0 65.0* 54.0*   O2SAT 88.3* 95.7* 94.3*     BNP  No results found for: BNP   D-Dimer:  Lab Results   Component Value Date    DDIMER 9659 (H) 11/26/2020      Radiology:   Lines and tubes appear in appropriate position. 2. Slight interval worsening of diffuse airspace opacities with probable    bilateral effusions. 3. Low lung volumes. 1.       Assessment/Plan     Patient Active Problem List    Diagnosis Date Noted    Acute respiratory failure with hypoxia ([facility] [address] 11/22/2020    KISHORE on CPAP 06/19/2017     Acute hypoxic resp failure sec to non Cardiogenic Pulmonary edema  VDRF  Leukocytosis  Bilateral Pneumonia  Non Calcified right sided Pulmonary nodules  COVID Pneumonia     1. Abx  2. F/u C&S  3. Keep sats > 92%  4. Solumedrol  5. Remdesivir  6. CXR in am  7.  DVT and GI Prophylaxis  8. Start tube feeds  9. Check prealbumin  10. C/w present management  No follow-ups on file.     Electronically signed by West Barton MD on 11/26/2020 at 9:41 AM

## 2020-11-26 NOTE — PROGRESS NOTES
Hospitalist Progress Note      Name:  Chris Okeefe /Age/Sex: 1945  (76 y.o. male)   MRN & CSN:  [id number] & [id number] Admission Date/Time: 2020  3:03 AM   Location:  -A PCP: Edd Knight MD         Hospital Day: 4    History of Present Illness:     Chief Complaint: Chris Okeefe is a 76 y.o.  male  who presents with SOB     The patient seen and examined at bed side. He remained intubated and sedated. Ten point ROS reviewed negative, unless as noted above    Objective: Intake/Output Summary (Last 24 hours) at 2020 1358  Last data filed at 2020 1222  Gross per 24 hour   Intake 1160.05 ml   Output 450 ml   Net 710.05 ml      Vitals:   Vitals:    20 1300   BP: (!) 87/52   Pulse: 59   Resp: 20   Temp:    SpO2: 94%     Physical Exam:   General Appearance: Intubated and sedated. Cardiovascular: normal rate, regular rhythm, normal S1 and S2  Pulmonary/Chest: Decreased breath sounds bilaterally with ronchi  Abdomen: soft, non-tender, non-distended, normal bowel sounds, no masses   Extremities: no cyanosis, clubbing or edema, pulse   Skin: warm and dry, no rash or erythema  Head: normocephalic and atraumatic  Eyes: pupils equal, round, and reactive to light  Neck: supple and non-tender without mass, no thyromegaly   Musculoskeletal: normal range of motion, no joint swelling, deformity or tenderness  Neurological: Intubated and sedated. Following commands.  NFD    Medications:   Medications:    cefepime  2 g Intravenous Q12H    vancomycin (VANCOCIN) intermittent dosing (placeholder)   Other RX Placeholder    methylPREDNISolone  40 mg Intravenous Daily    sodium chloride flush  10 mL Intravenous 2 times per day    albuterol sulfate HFA  4 puff Inhalation Q4H    And    ipratropium  4 puff Inhalation Q4H    chlorhexidine  15 mL Mouth/Throat BID    famotidine (PEPCID) injection  20 mg Intravenous BID    remdesivir IVPB  100 mg Intravenous Q24H    lidocaine PF  5 mL Intradermal See Admin Instructions    sodium chloride flush  10 mL Intravenous 2 times per day    sodium chloride flush  10 mL Intravenous 2 times per day    enoxaparin  40 mg Subcutaneous Daily    vitamin D  2,000 Units Oral Daily    atorvastatin  40 mg Oral Daily    losartan  100 mg Oral Daily      Infusions:    sodium chloride 50 mL/hr at 11/26/20 1258    fentanyl 75 mcg/hr (11/26/20 1137)    propofol 40 mcg/kg/min (11/26/20 1238)     PRN Meds: sodium chloride flush, 10 mL, PRN  hydrOXYzine, 50 mg, Q6H PRN  sodium chloride, 30 mL, PRN  sodium chloride flush, 10 mL, PRN  sodium chloride flush, 10 mL, PRN  acetaminophen, 650 mg, Q6H PRN    Or  acetaminophen, 650 mg, Q6H PRN  polyethylene glycol, 17 g, Daily PRN  promethazine, 12.5 mg, Q6H PRN    Or  ondansetron, 4 mg, Q6H PRN  guaiFENesin-dextromethorphan, 5 mL, Q4H PRN            Pertinent New Labs & Imaging Studies     CBC with Differential:    Lab Results   Component Value Date    WBC 21.8 11/26/2020    RBC 4.20 11/26/2020    HGB 12.8 11/26/2020    HCT 40.9 11/26/2020     11/26/2020    MCV 97.4 11/26/2020    MCH 30.5 11/26/2020    MCHC 31.3 11/26/2020    RDW 13.5 11/26/2020    SEGSPCT 86.0 11/26/2020    BANDSPCT 8 11/26/2020    LYMPHOPCT 1.0 11/26/2020    PROMYELOPCT 1 11/25/2020    MONOPCT 4.0 11/26/2020    MYELOPCT 1 11/25/2020    BASOPCT 0.2 11/24/2020    MONOSABS 0.9 11/26/2020    LYMPHSABS 0.2 11/26/2020    EOSABS 0.0 11/24/2020    BASOSABS 0.0 11/24/2020    DIFFTYPE MANUAL DIFFERENTIAL 11/26/2020     CMP:    Lab Results   Component Value Date     11/26/2020    K 4.6 11/26/2020     11/26/2020    CO2 26 11/26/2020    BUN 58 11/26/2020    CREATININE 1.6 11/26/2020    GFRAA 51 11/26/2020    LABGLOM 42 11/26/2020    GLUCOSE 159 11/26/2020    PROT 5.7 11/26/2020    LABALBU 2.8 11/26/2020    CALCIUM 7.7 11/26/2020    BILITOT 0.3 11/26/2020    ALKPHOS 110 11/26/2020    AST 79 11/26/2020    ALT 58 11/26/2020     No results found.     Assessment and Plan:   Laurell Closs is a 76 y.o.  male  who presents with SOB    Acute respiratory failure with hypoxia  COVID-19 pneumonia  Likely superimposed bacterial pneumonia  S/p intubation on 11/25/2020  Currently patient is on BIPAP  Chest X ray with consolidation within the right mid and upper lung  Dexamethasone>>changed to Soulmedrol  Continue Remdesivir  S/p Convalescent plasma  Continue monitoring inflammatory markers  Continue empiric Cefepime  Urine Legionella and Strep ag negative  MRSA negative D/C Vancomycin  Pulmonary consulted, appreciated recommendations  WBC remains to be high    MICHELLE  Likely pre renal  Started on Fluids    Hyponatreia and Hypokalemia  Replaced    Hypertension  Currently blood pressure, holding medications    Diet DIET LOW SODIUM 2 GM;   DVT Prophylaxis [x] Lovenox, []  Heparin, [] SCDs, [] Ambulation   GI Prophylaxis [x] PPI,  [] H2 Blocker,  [] Carafate,  [] Diet/Tube Feeds   Code Status Full Code   Disposition Patient requires continued admission due to COVID 19 PNA   MDM [] Low, [x] Moderate,[]  High     Electronically signed by Matheus Hagen MD on 11/26/2020 at 1:58 PM

## 2020-11-26 NOTE — PROGRESS NOTES
RENAL DOSE ADJUSTMENT MADE PER P/T PROTOCOL    PREVIOUS ORDER:  Cefepime 2g q8h     Estimated Creatinine Clearance: 47 mL/min (A) (based on SCr of 1.6 mg/dL (H)).   Recent Labs     11/24/20  0340 11/25/20  0320 11/26/20  0520   BUN 28* 33* 58*   CREATININE 1.0 1.0 1.6*    264 210   INR 0.97 0.97 1.10     NEW RENALLY ADJUSTED ORDER:  Cefepime 2g q12h     Mariam Gupta, [address] [facility]  11/26/2020 10:54 AM

## 2020-11-27 NOTE — CONSULTS
Nephrology Service Consultation    Patient:  Nicole Schwartz  MRN: [id number]  Consulting physician:  Ashok Hendricks,*  Reason for Consult: MICHELLE  History Obtained From:  electronic medical record  PCP: Clyde Mistry MD    HISTORY OF PRESENT ILLNESS:   The patient is a 76 y.o. male who presents with resp failure, COVID and now intubated  Chart reviewed  Discussed history and therapy with primary RN  Urine output has gone down    Past Medical History:        Diagnosis Date    GERD (gastroesophageal reflux disease)     Hyperlipidemia     Hypertension        Past Surgical History:        Procedure Laterality Date    APPENDECTOMY         Medications:   Scheduled Meds:   albumin human  25 g Intravenous Q6H    cefepime  2 g Intravenous Q12H    methylPREDNISolone  40 mg Intravenous Daily    sodium chloride flush  10 mL Intravenous 2 times per day    albuterol sulfate HFA  4 puff Inhalation Q4H    And    ipratropium  4 puff Inhalation Q4H    chlorhexidine  15 mL Mouth/Throat BID    famotidine (PEPCID) injection  20 mg Intravenous BID    remdesivir IVPB  100 mg Intravenous Q24H    lidocaine PF  5 mL Intradermal See Admin Instructions    sodium chloride flush  10 mL Intravenous 2 times per day    sodium chloride flush  10 mL Intravenous 2 times per day    enoxaparin  40 mg Subcutaneous Daily    vitamin D  2,000 Units Oral Daily    atorvastatin  40 mg Oral Daily    losartan  100 mg Oral Daily     Continuous Infusions:   sodium chloride 50 mL/hr at 11/27/20 0913    fentanyl 75 mcg/hr (11/27/20 0225)    propofol 45 mcg/kg/min (11/27/20 1047)     PRN Meds:.potassium chloride, sodium chloride flush, hydrOXYzine, sodium chloride, sodium chloride flush, sodium chloride flush, acetaminophen **OR** acetaminophen, polyethylene glycol, promethazine **OR** ondansetron, guaiFENesin-dextromethorphan    Allergies:  Patient has no known allergies.     Social History:   TOBACCO:   reports that he has never smoked. He has never used smokeless tobacco.  ETOH:   reports no history of alcohol use. OCCUPATION:      Family History:   No family history on file. REVIEW OF SYSTEMS:  Negative except for unobtainable    Physical Exam:    Vitals: BP (!) 143/74   Pulse 70   Temp 97.6 °F (36.4 °C) (Rectal)   Resp 22   Ht 5' 11\" (1.803 m)   Wt 212 lb 8.4 oz (96.4 kg)   SpO2 96%   BMI 29.64 kg/m²   General appearance: on vent  Neurologic: Mental status: alertness: obtunded    CBC:   Recent Labs     11/25/20 0320 11/26/20 0520 11/27/20  0310   WBC 22.6* 21.8* 19.1*   HGB 15.2 12.8* 11.7*    210 181     BMP:    Recent Labs     11/25/20 0320 11/26/20 0520 11/27/20  0310    139 136   K 3.9 4.6 4.2   CL 98* 101 101   CO2 25 26 21   BUN 33* 58* 74*   CREATININE 1.0 1.6* 1.8*   GLUCOSE 119* 159* 111*     Troponin: No results for input(s): TROPONINI in the last 72 hours. BNP: No results for input(s): BNP in the last 72 hours. Lipids: No results for input(s): CHOL, HDL in the last 72 hours. Invalid input(s): LDLCALCU  ABGs:   Lab Results   Component Value Date    PO2ART 68 11/27/2020    RLN4BTO 53.0 11/27/2020     -----------------------------------------------------------------      Assessment and Recommendations     Patient Active Problem List   Diagnosis Code    KISHORE on CPAP G47.33, Z99.89    Acute respiratory failure with hypoxia (HCC) J96.01   IMP  MICHELLE likely prerenal vs early ATI  Volume depletion  Electrolyte imbalance  Proteinuria  Suggest  IVF at 1-1.5 ml/kg/hr  Urine protein creat ratio  Renal US  Electrolyte repletion  Stop any offending agents including ARB  Avoid nephrotoxic insults  Will follow      Andria Arce MD    Due to the current efforts to prevent transmission of COVID-19 and also the need to preserve PPE for other caregivers, a face-to-face encounter with the patient was not performed.  That being said, all relevant records and diagnostic tests were reviewed, including laboratory results and imaging. Please reference any relevant documentation elsewhere. Care will be coordinated by the primary service.

## 2020-11-27 NOTE — PROGRESS NOTES
Pulmonary and Critical Care  Progress Note      VITALS:  BP (!) 143/74   Pulse 70   Temp 97.6 °F (36.4 °C) (Rectal)   Resp 22   Ht 5' 11\" (1.803 m)   Wt 212 lb 8.4 oz (96.4 kg)   SpO2 96%   BMI 29.64 kg/m²     Subjective:   CHIEF COMPLAINT :SOB     HPI:                The patient is on the vent and sedated. He has minimal response to painful stimuli. His CXR is slowly improving    Objective:   PHYSICAL EXAM:    LUNGS:Occasional basal crackles  Abd-soft, BS+,NT  Ext -No pedal edema  CVS-s1s2, no murmurs      DATA:    CBC:  Recent Labs     11/25/20 0320 11/26/20  0520 11/27/20  0310   WBC 22.6* 21.8* 19.1*   RBC 4.98 4.20* 3.59*   HGB 15.2 12.8* 11.7*   HCT 46.3 40.9* 34.9*    210 181   MCV 93.0 97.4 97.2   MCH 30.5 30.5 32.6*   MCHC 32.8 31.3* 33.5   RDW 13.0 13.5 13.8   SEGSPCT 79.0* 86.0* 67.0*   BANDSPCT 12* 8 12*      BMP:  Recent Labs     11/25/20 0320 11/26/20  0520 11/27/20  0310    139 136   K 3.9 4.6 4.2   CL 98* 101 101   CO2 25 26 21   BUN 33* 58* 74*   CREATININE 1.0 1.6* 1.8*   CALCIUM 7.8* 7.7* 6.4*   GLUCOSE 119* 159* 111*      ABG:  Recent Labs     11/25/20  1930 11/26/20  0600 11/27/20  0600   PH 7.25* 7.31* 7.28*   PO2ART 104* 84 68*   YGO3ROB 65.0* 54.0* 53.0*   O2SAT 95.7* 94.3* 91.7*     BNP  No results found for: BNP   D-Dimer:  Lab Results   Component Value Date    DDIMER 4040 (H) 11/27/2020      Radiology:   Slight improvement bilateral airspace disease.  The imaging findings are    suggestive of COVID-19 pneumonia     1. Assessment/Plan     Patient Active Problem List    Diagnosis Date Noted    Acute respiratory failure with hypoxia ([facility] [address] 11/22/2020    KISHORE on CPAP 06/19/2017     Acute hypoxic resp failure sec to non Cardiogenic Pulmonary edema  VDRF  Leukocytosis - improving  Bilateral Pneumonia  Non Calcified right sided Pulmonary nodules  COVID Pneumonia  MICHELLE  Moderate malnutrition     1. IV fluids  2. BMP in am  3. Keep sats > 92%  4. Abx  5. F/u C&S  6.  Tube feeds  7. Nutritional optimization  8. CXR in am  9. SAT and SBT trial in am  10. Remdesivir  11. Decadron  12. C.w present management  No follow-ups on file.     Electronically signed by Kelby Saldivar MD on 11/27/2020 at 10:26 AM

## 2020-11-27 NOTE — PROGRESS NOTES
Hospitalist Progress Note      Name:  Keron Mills /Age/Sex: 1945  (76 y.o. male)   MRN & CSN:  [id number] & [id number] Admission Date/Time: 2020  3:03 AM   Location:  -A PCP: Carmita Nation MD         Hospital Day: 5    History of Present Illness:     Chief Complaint: Keron Mills is a 76 y.o.  male  who presents with SOB     The patient seen and examined at bed side. He remained intubated and sedated. Ten point ROS reviewed negative, unless as noted above    Objective: Intake/Output Summary (Last 24 hours) at 2020 1221  Last data filed at 2020 1102  Gross per 24 hour   Intake 702 ml   Output 1040 ml   Net -338 ml      Vitals:   Vitals:    20 1100   BP: (!) 140/71   Pulse: 77   Resp: 26   Temp: 98.1 °F (36.7 °C)   SpO2: 94%     Physical Exam:   General Appearance: Intubated and sedated. Cardiovascular: normal rate, regular rhythm, normal S1 and S2  Pulmonary/Chest: Decreased breath sounds bilaterally with ronchi  Abdomen: soft, non-tender, non-distended, normal bowel sounds, no masses   Extremities: no cyanosis, clubbing or edema, pulse   Skin: warm and dry, no rash or erythema  Head: normocephalic and atraumatic  Eyes: pupils equal, round, and reactive to light  Neck: supple and non-tender without mass, no thyromegaly   Musculoskeletal: normal range of motion, no joint swelling, deformity or tenderness  Neurological: Intubated and sedated. Following commands.  NFD    Medications:   Medications:    albumin human  25 g Intravenous Q6H    cefepime  2 g Intravenous Q12H    methylPREDNISolone  40 mg Intravenous Daily    sodium chloride flush  10 mL Intravenous 2 times per day    albuterol sulfate HFA  4 puff Inhalation Q4H    And    ipratropium  4 puff Inhalation Q4H    chlorhexidine  15 mL Mouth/Throat BID    famotidine (PEPCID) injection  20 mg Intravenous BID    remdesivir IVPB  100 mg Intravenous Q24H    lidocaine PF  5 mL Intradermal See Admin Instructions    sodium chloride flush  10 mL Intravenous 2 times per day    sodium chloride flush  10 mL Intravenous 2 times per day    enoxaparin  40 mg Subcutaneous Daily    vitamin D  2,000 Units Oral Daily    atorvastatin  40 mg Oral Daily    losartan  100 mg Oral Daily      Infusions:    sodium chloride 50 mL/hr at 11/27/20 0913    fentanyl 100 mcg/hr (11/27/20 1053)    propofol 45 mcg/kg/min (11/27/20 1050)     PRN Meds: potassium chloride, 10 mEq, PRN  sodium chloride flush, 10 mL, PRN  hydrOXYzine, 50 mg, Q6H PRN  sodium chloride, 30 mL, PRN  sodium chloride flush, 10 mL, PRN  sodium chloride flush, 10 mL, PRN  acetaminophen, 650 mg, Q6H PRN    Or  acetaminophen, 650 mg, Q6H PRN  polyethylene glycol, 17 g, Daily PRN  promethazine, 12.5 mg, Q6H PRN    Or  ondansetron, 4 mg, Q6H PRN  guaiFENesin-dextromethorphan, 5 mL, Q4H PRN            Pertinent New Labs & Imaging Studies     CBC with Differential:    Lab Results   Component Value Date    WBC 19.1 11/27/2020    RBC 3.59 11/27/2020    HGB 11.7 11/27/2020    HCT 34.9 11/27/2020     11/27/2020    MCV 97.2 11/27/2020    MCH 32.6 11/27/2020    MCHC 33.5 11/27/2020    RDW 13.8 11/27/2020    SEGSPCT 67.0 11/27/2020    BANDSPCT 12 11/27/2020    LYMPHOPCT 3.0 11/27/2020    PROMYELOPCT 9 11/27/2020    MONOPCT 2.0 11/27/2020    MYELOPCT 3 11/27/2020    BASOPCT 0.2 11/24/2020    MONOSABS 0.4 11/27/2020    LYMPHSABS 0.6 11/27/2020    EOSABS 0.0 11/24/2020    BASOSABS 0.0 11/24/2020    DIFFTYPE MANUAL DIFFERENTIAL 11/27/2020     CMP:    Lab Results   Component Value Date     11/27/2020    K 4.2 11/27/2020     11/27/2020    CO2 21 11/27/2020    BUN 74 11/27/2020    CREATININE 1.8 11/27/2020    GFRAA 45 11/27/2020    LABGLOM 37 11/27/2020    GLUCOSE 111 11/27/2020    PROT 5.0 11/27/2020    LABALBU 1.8 11/27/2020    CALCIUM 6.4 11/27/2020    BILITOT 0.3 11/27/2020    ALKPHOS 98 11/27/2020    AST 49 11/27/2020    ALT 39 11/27/2020     No results found.     Assessment and Plan:   Germán Mobley is a 76 y.o.  male  who presents with SOB    Acute respiratory failure with hypoxia  COVID-19 pneumonia  Likely superimposed bacterial pneumonia  S/p intubation on 11/25/2020  Currently patient is on BIPAP  Chest X ray with consolidation within the right mid and upper lung  Dexamethasone>>changed to Soulmedrol  Continue Remdesivir  S/p Convalescent plasma  Continue monitoring inflammatory markers  Continue empiric Cefepime  Urine Legionella and Strep ag negative  MRSA negative D/C Vancomycin  Pulmonary consulted, appreciated recommendations  WBC remains to be high      MICHELLE  Likely pre renal  Started on Fluids  Consulted nephrology  Added Albumin    Hyponatreia and Hypokalemia  Replaced    Hypertension  Currently blood pressure, holding medications    Diet Diet NPO Effective Now  Diet Tube Feed Continuous/Cyclic w/ Diet   DVT Prophylaxis [x] Lovenox, []  Heparin, [] SCDs, [] Ambulation   GI Prophylaxis [x] PPI,  [] H2 Blocker,  [] Carafate,  [] Diet/Tube Feeds   Code Status Full Code   Disposition Patient requires continued admission due to COVID 19 PNA   MDM [] Low, [x] Moderate,[]  High     Electronically signed by Diomedes Cummins MD on 11/27/2020 at 12:21 PM

## 2020-11-27 NOTE — PROGRESS NOTES
11/27/20 0429   Vent Information   Equipment Changed HME   Vent Type 980   Vent Mode AC/VC   Vt Ordered 500 mL   Rate Set 16 bmp   Peak Flow 60 L/min   Pressure Support 0 cmH20   FiO2  90 %   SpO2 91 %   SpO2/FiO2 ratio 101.11   Sensitivity 3   PEEP/CPAP 12   I Time/ I Time % 0 s   Humidification Source HME   Nitric Oxide/Epoprostenol In Use? No   Vent Patient Data   High Peep/I Pressure 0   Peak Inspiratory Pressure 22 cmH2O   Mean Airway Pressure 13 cmH20   Rate Measured 20 br/min   Vt Exhaled 474 mL   Minute Volume 10.5 Liters   I:E Ratio 1.00:1   Spontaneous Breathing Trial (SBT) RT Doc   Pulse 60   Additional Respiratory  Assessments   Resp 21   Alarm Settings   High Pressure Alarm 40 cmH2O   Delay Alarm 20 sec(s)   Low Minute Volume Alarm 2.5 L/min   Apnea (secs) 20 secs   High Respiratory Rate 40 br/min   Low Exhaled Vt  250 mL   Patient Observation   Observations RRT changed the ETT to the left side of the pt's mouth   ETT (adult)   Placement Date/Time: 11/25/20 5880   Timeout: Patient;Procedure; Appropriate Equipment  Preoxygenation: Yes  Tube Size: 7.5 mm  Location: Oral  Insertion attempts: 1  Secured at: (c) 24 cm  Placed By: (c) Other (Comment)   Secured at 24 cm   Measured From Lips   ET Placement Left   Secured By Commercial tube jones   Site Condition Dry

## 2020-11-28 NOTE — PROGRESS NOTES
Per Nursing notification by Dr. Rhea Person, Advance ETT 3 cm. ETT appears to be 23 at the lip at this time. Advance to 26. Austin Castillo RN to order follow up chest xray.

## 2020-11-28 NOTE — PROGRESS NOTES
Pulmonary and Critical Care  Progress Note      VITALS:  /63   Pulse 72   Temp 98.6 °F (37 °C) (Rectal)   Resp 23   Ht 5' 11\" (1.803 m)   Wt 221 lb 12.5 oz (100.6 kg)   SpO2 91%   BMI 30.93 kg/m²     Subjective:   CHIEF COMPLAINT :SOB      HPI:                The patient is on the vent and sedated. He is not in acute reps distress. He is responding to painful stimuli    Objective:   PHYSICAL EXAM:    LUNGS:Occasional basal crackles  Abd-soft, BS+,NT  Ext -No pedal edema  CVS-s1s2, no murmurs      DATA:    CBC:  Recent Labs     11/26/20  0520 11/27/20  0310 11/28/20  0500   WBC 21.8* 19.1* 23.0*   RBC 4.20* 3.59* 3.76*   HGB 12.8* 11.7* 11.8*   HCT 40.9* 34.9* 36.6*    181 184   MCV 97.4 97.2 97.3   MCH 30.5 32.6* 31.4*   MCHC 31.3* 33.5 32.2   RDW 13.5 13.8 13.9   SEGSPCT 86.0* 67.0* 83.0*   BANDSPCT 8 12* 10      BMP:  Recent Labs     11/27/20  0310 11/27/20  1241 11/28/20  0500    141 140   K 4.2 4.5 4.2    104 105   CO2 21 24 22   BUN 74* 79* 99*   CREATININE 1.8* 2.1* 2.1*   CALCIUM 6.4* 7.5* 7.5*   GLUCOSE 111* 117* 109*      ABG:  Recent Labs     11/26/20  0600 11/27/20  0600 11/28/20  0600   PH 7.31* 7.28* 7.20*   PO2ART 84 68* 74*   MCE8GVC 54.0* 53.0* 59.0*   O2SAT 94.3* 91.7* 92.9*     BNP  No results found for: BNP   D-Dimer:  Lab Results   Component Value Date    DDIMER 5186 (H) 11/28/2020      Radiology:   No significant change to patchy/streaky bilateral airspace opacities.         Support apparatus appears stable.  Of note, endotracheal tube remains    somewhat high riding with tip 8.1 cm from the cindy.  Consider advancement     1.        Assessment/Plan     Patient Active Problem List    Diagnosis Date Noted    MICHELLE [facility]acute kidney [facility]) ([facility] [address]     Electrolyte imbalance     Isolated proteinuria with minor glomerular abnormality     Volume depletion     Acute respiratory failure with hypoxia ([facility] [address] 11/22/2020    KISHORE on CPAP 06/19/2017     Acute hypoxic resp failure

## 2020-11-28 NOTE — PLAN OF CARE
Problem: Airway Clearance - Ineffective  Goal: Achieve or maintain patent airway  11/28/2020 0759 by Ricarda Bah RN  Outcome: Ongoing  11/28/2020 0630 by Denver Slater, RN  Outcome: Ongoing     Problem: Gas Exchange - Impaired  Goal: Absence of hypoxia  11/28/2020 0759 by Ricarda Bah RN  Outcome: Ongoing  11/28/2020 0630 by Denver Slater, RN  Outcome: Ongoing  Goal: Promote optimal lung function  11/28/2020 0759 by Ricarda Bah RN  Outcome: Ongoing  11/28/2020 0630 by Denver Slater, RN  Outcome: Ongoing     Problem: Breathing Pattern - Ineffective  Goal: Ability to achieve and maintain a regular respiratory rate  11/28/2020 0759 by Ricarda Bah RN  Outcome: Ongoing  11/28/2020 0630 by Denver Slater, RN  Outcome: Ongoing     Problem:  Body Temperature -  Risk of, Imbalanced  Goal: Ability to maintain a body temperature within defined limits  11/28/2020 0759 by Ricarda Bah RN  Outcome: Ongoing  11/28/2020 0630 by Denver Slater, RN  Outcome: Ongoing  Goal: Will regain or maintain usual level of consciousness  11/28/2020 0759 by Ricarda Bah RN  Outcome: Ongoing  11/28/2020 0630 by Denver Slater, RN  Outcome: Ongoing  Goal: Complications related to the disease process, condition or treatment will be avoided or minimized  11/28/2020 0759 by Ricarda Bah RN  Outcome: Ongoing  11/28/2020 0630 by Denver Slater, RN  Outcome: Ongoing     Problem: Isolation Precautions - Risk of Spread of Infection  Goal: Prevent transmission of infection  11/28/2020 0759 by Ricarda Bah RN  Outcome: Ongoing  11/28/2020 0630 by Denver Slater, RN  Outcome: Ongoing     Problem: Nutrition Deficits  Goal: Optimize nutrtional status  11/28/2020 0759 by Ricarda Bah RN  Outcome: Ongoing  11/28/2020 0630 by Denver Slater, RN  Outcome: Ongoing     Problem: Risk for Fluid Volume Deficit  Goal: Maintain normal heart rhythm  11/28/2020 0759 by Ricarda Bah RN  Outcome: Ongoing  11/28/2020 0630 by Denver Slater, RN  Outcome: Ongoing  Goal: Maintain absence of muscle cramping  11/28/2020 0759 by Latoya Wu RN  Outcome: Ongoing  11/28/2020 0630 by Bailey Murphy RN  Outcome: Ongoing  Goal: Maintain normal serum potassium, sodium, calcium, phosphorus, and pH  11/28/2020 0759 by Latoya Wu RN  Outcome: Ongoing  11/28/2020 0630 by Bailey Murphy RN  Outcome: Ongoing     Problem: Loneliness or Risk for Loneliness  Goal: Demonstrate positive use of time alone when socialization is not possible  11/28/2020 0759 by Latoya Wu RN  Outcome: Ongoing  11/28/2020 0630 by Bailey Murphy RN  Outcome: Ongoing     Problem: Fatigue  Goal: Verbalize increase energy and improved vitality  11/28/2020 0759 by Latoya Wu RN  Outcome: Ongoing  11/28/2020 0630 by Bailey Murphy RN  Outcome: Ongoing     Problem: Patient Education: Go to Patient Education Activity  Goal: Patient/Family Education  11/28/2020 0759 by Latoya Wu RN  Outcome: Ongoing  11/28/2020 0630 by Bailey Murphy RN  Outcome: Ongoing     Problem: Falls - Risk of:  Goal: Will remain free from falls  Description: Will remain free from falls  11/28/2020 0759 by Latoya Wu RN  Outcome: Ongoing  11/28/2020 0630 by Bailey Murphy RN  Outcome: Ongoing  Goal: Absence of physical injury  Description: Absence of physical injury  11/28/2020 0759 by Latoya Wu RN  Outcome: Ongoing  11/28/2020 0630 by Bailey Murphy RN  Outcome: Ongoing     Problem: Restraint Use - Nonviolent/Non-Self-Destructive Behavior:  Goal: Absence of restraint indications  Description: Absence of restraint indications  11/28/2020 0759 by Latoya Wu RN  Outcome: Ongoing  11/28/2020 0630 by Bailey Murphy RN  Outcome: Ongoing  Goal: Absence of restraint-related injury  Description: Absence of restraint-related injury  11/28/2020 0759 by Latoya Wu RN  Outcome: Ongoing  11/28/2020 0630 by Bailey Murphy RN  Outcome: Ongoing     Problem: Skin Integrity:  Goal: Will show no infection signs and symptoms  Description: Will show no infection signs and symptoms  11/28/2020 0759 by Paola Butts RN  Outcome: Ongoing  11/28/2020 0630 by Donnie Hernandez RN  Outcome: Ongoing  Goal: Absence of new skin breakdown  Description: Absence of new skin breakdown  11/28/2020 0759 by Paola Butts RN  Outcome: Ongoing  11/28/2020 0630 by Donnie Hernandez RN  Outcome: Ongoing

## 2020-11-28 NOTE — PROGRESS NOTES
Nephrology Progress Note  11/28/2020 11:04 AM  Subjective:   Admit Date: 11/23/2020  PCP: Yao Jc MD  Interval History: intubated  Good urine output 1400  Diet: DIET TUBE FEED CONTINUOUS/CYCLIC NPO; Low Calorie High Protein (Vital HP); Nasogastric; Continuous; 25; 60; 24      Data:   Scheduled Meds:   meropenem  1 g Intravenous Q12H    vancomycin  1,500 mg Intravenous Once    vancomycin (VANCOCIN) intermittent dosing (placeholder)   Other RX Placeholder    albumin human  25 g Intravenous Q6H    methylPREDNISolone  40 mg Intravenous Daily    sodium chloride flush  10 mL Intravenous 2 times per day    albuterol sulfate HFA  4 puff Inhalation Q4H    And    ipratropium  4 puff Inhalation Q4H    chlorhexidine  15 mL Mouth/Throat BID    famotidine (PEPCID) injection  20 mg Intravenous BID    remdesivir IVPB  100 mg Intravenous Q24H    lidocaine PF  5 mL Intradermal See Admin Instructions    sodium chloride flush  10 mL Intravenous 2 times per day    sodium chloride flush  10 mL Intravenous 2 times per day    enoxaparin  40 mg Subcutaneous Daily    vitamin D  2,000 Units Oral Daily    atorvastatin  40 mg Oral Daily     Continuous Infusions:   sodium chloride 50 mL/hr at 11/28/20 0504    fentanyl 25 mcg/hr (11/28/20 0038)    propofol 50 mcg/kg/min (11/28/20 1004)     PRN Meds:potassium chloride, sodium chloride flush, hydrOXYzine, sodium chloride, sodium chloride flush, sodium chloride flush, acetaminophen **OR** acetaminophen, polyethylene glycol, promethazine **OR** ondansetron, guaiFENesin-dextromethorphan  I/O last 3 completed shifts:   In: 2975 [I.V.:2825; IV Piggyback:150]  Out: 3519 [Urine:1460; Emesis/NG output:160]  I/O this shift:  In: 10 [I.V.:10]  Out: -     Intake/Output Summary (Last 24 hours) at 11/28/2020 1104  Last data filed at 11/28/2020 0800  Gross per 24 hour   Intake 2985 ml   Output 980 ml   Net 2005 ml     CBC:   Recent Labs     11/26/20  0520 11/27/20  0310 11/28/20  0500   WBC 21.8* 19.1* 23.0*   HGB 12.8* 11.7* 11.8*    181 184     BMP:    Recent Labs     11/27/20  0310 11/27/20  1241 11/28/20  0500    141 140   K 4.2 4.5 4.2    104 105   CO2 21 24 22   BUN 74* 79* 99*   CREATININE 1.8* 2.1* 2.1*   GLUCOSE 111* 117* 109*     Troponin: No results for input(s): TROPONINI in the last 72 hours. BNP: No results for input(s): BNP in the last 72 hours. Lipids: No results for input(s): CHOL, HDL in the last 72 hours. Invalid input(s): LDLCALCU  ABGs:   Lab Results   Component Value Date    PO2ART 74 11/28/2020    MNB7KMA 59.0 11/28/2020       Objective:   Vitals: BP (!) 142/70   Pulse 97   Temp 100.6 °F (38.1 °C) (Rectal)   Resp 27   Ht 5' 11\" (1.803 m)   Wt 221 lb 12.5 oz (100.6 kg)   SpO2 93%   BMI 30.93 kg/m²       Assessment and Plan:     Patient Active Problem List:     KISHORE on CPAP     Acute respiratory failure with hypoxia (HCC)     MICHELLE (acute kidney injury) ([facility] [address]     Electrolyte imbalance     Isolated proteinuria with minor glomerular abnormality     Volume depletion  MICHELLE  With disproportionate BUN from volume depletion and steroids    Plan     Change IVF to 100 ml/hr  Recheck BMP in am  Continue the excellent COVID therapy    Jamaica Marsh MD  Due to the current efforts to prevent transmission of COVID-19 and also the need to preserve PPE for other caregivers, a face-to-face encounter with the patient was not performed. That being said, all relevant records and diagnostic tests were reviewed, including laboratory results and imaging. Please reference any relevant documentation elsewhere. Care will be coordinated by the primary service.

## 2020-11-28 NOTE — PROGRESS NOTES
Hospitalist Progress Note      Name:  Errol Silver /Age/Sex: 1945  (76 y.o. male)   MRN & CSN:  [id number] & [id number] Admission Date/Time: 2020  3:03 AM   Location:  -A PCP: Maribel Wood MD         Hospital Day: 6    History of Present Illness:     Chief Complaint: Errol Silver is a 76 y.o.  male  who presents with SOB     The patient seen and examined at bed side. He remained intubated and sedated. Ten point ROS reviewed negative, unless as noted above    Objective: Intake/Output Summary (Last 24 hours) at 2020 1134  Last data filed at 2020 0800  Gross per 24 hour   Intake 2985 ml   Output 980 ml   Net 2005 ml      Vitals:   Vitals:    20 1120   BP:    Pulse: 90   Resp: 24   Temp:    SpO2: 97%     Physical Exam:   General Appearance: Intubated and sedated. Cardiovascular: normal rate, regular rhythm, normal S1 and S2  Pulmonary/Chest: Decreased breath sounds bilaterally with ronchi  Abdomen: soft, non-tender, non-distended, normal bowel sounds, no masses   Extremities: no cyanosis, clubbing or edema, pulse   Skin: warm and dry, no rash or erythema  Head: normocephalic and atraumatic  Eyes: pupils equal, round, and reactive to light  Neck: supple and non-tender without mass, no thyromegaly   Musculoskeletal: normal range of motion, no joint swelling, deformity or tenderness  Neurological: Intubated and sedated. Following commands.  NFD    Medications:   Medications:    meropenem  1 g Intravenous Q12H    vancomycin  1,500 mg Intravenous Once    vancomycin (VANCOCIN) intermittent dosing (placeholder)   Other RX Placeholder    albumin human  25 g Intravenous Q6H    methylPREDNISolone  40 mg Intravenous Daily    sodium chloride flush  10 mL Intravenous 2 times per day    albuterol sulfate HFA  4 puff Inhalation Q4H    And    ipratropium  4 puff Inhalation Q4H    chlorhexidine  15 mL Mouth/Throat BID    famotidine (PEPCID) injection  20 mg Intravenous BID    remdesivir IVPB  100 mg Intravenous Q24H    lidocaine PF  5 mL Intradermal See Admin Instructions    sodium chloride flush  10 mL Intravenous 2 times per day    sodium chloride flush  10 mL Intravenous 2 times per day    enoxaparin  40 mg Subcutaneous Daily    vitamin D  2,000 Units Oral Daily    atorvastatin  40 mg Oral Daily      Infusions:    sodium chloride 100 mL/hr at 11/28/20 1110    fentanyl 50 mcg/hr (11/28/20 1105)    propofol 60 mcg/kg/min (11/28/20 1118)     PRN Meds: potassium chloride, 10 mEq, PRN  sodium chloride flush, 10 mL, PRN  hydrOXYzine, 50 mg, Q6H PRN  sodium chloride, 30 mL, PRN  sodium chloride flush, 10 mL, PRN  sodium chloride flush, 10 mL, PRN  acetaminophen, 650 mg, Q6H PRN    Or  acetaminophen, 650 mg, Q6H PRN  polyethylene glycol, 17 g, Daily PRN  promethazine, 12.5 mg, Q6H PRN    Or  ondansetron, 4 mg, Q6H PRN  guaiFENesin-dextromethorphan, 5 mL, Q4H PRN            Pertinent New Labs & Imaging Studies     CBC with Differential:    Lab Results   Component Value Date    WBC 23.0 11/28/2020    RBC 3.76 11/28/2020    HGB 11.8 11/28/2020    HCT 36.6 11/28/2020     11/28/2020    MCV 97.3 11/28/2020    MCH 31.4 11/28/2020    MCHC 32.2 11/28/2020    RDW 13.9 11/28/2020    SEGSPCT 83.0 11/28/2020    BANDSPCT 10 11/28/2020    LYMPHOPCT 1.0 11/28/2020    PROMYELOPCT 9 11/27/2020    MONOPCT 3.0 11/28/2020    MYELOPCT 1 11/28/2020    BASOPCT 0.2 11/24/2020    MONOSABS 0.7 11/28/2020    LYMPHSABS 0.2 11/28/2020    EOSABS 0.0 11/24/2020    BASOSABS 0.0 11/24/2020    DIFFTYPE MANUAL DIFFERENTIAL 11/28/2020     CMP:    Lab Results   Component Value Date     11/28/2020    K 4.2 11/28/2020     11/28/2020    CO2 22 11/28/2020    BUN 99 11/28/2020    CREATININE 2.1 11/28/2020    GFRAA 37 11/28/2020    LABGLOM 31 11/28/2020    GLUCOSE 109 11/28/2020    PROT 6.1 11/28/2020    LABALBU 3.5 11/28/2020    CALCIUM 7.5 11/28/2020    BILITOT 0.5 11/28/2020    ALKPHOS 113 11/28/2020    AST 48 11/28/2020    ALT 39 11/28/2020     No results found. Assessment and Plan:   Marlyn Lopes is a 76 y.o.  male  who presents with SOB    Acute respiratory failure with hypoxia  COVID-19 pneumonia  Likely superimposed bacterial pneumonia  Concern for VAP  S/p intubation on 11/25/2020  Repeat Chest X ray with persistent infiltrates  Dexamethasone>>changed to Soulmedrol--Continue  Continue Remdesivir  S/p Convalescent plasma  Continue monitoring inflammatory markers  Improved Procalcitonin but still elevated  T max 100.6 overnight, abx switched from Cefepime to Meropenem and added Vancomycin, repeated cultures  Urine Legionella and Strep ag negative  MRSA negative D/C Vancomycin  Pulmonary consulted, appreciated recommendations  Respiratory acidosis. Needed vent change, will defer to Pulmonary  WBC remains to be high, trending up  Added ID consult      MICHELLE  Likely pre renal  Creatinine stable at 2.1  Started on Fluids  Nephrology recommendations appreciated  Added Albumin    Hyponatreia and Hypokalemia  Replaced     Hypertension  Currently blood pressure, holding medications    Diet DIET TUBE FEED CONTINUOUS/CYCLIC NPO; Low Calorie High Protein (Vital HP);  Nasogastric; Continuous; 25; 60; 24   DVT Prophylaxis [x] Lovenox, []  Heparin, [] SCDs, [] Ambulation   GI Prophylaxis [x] PPI,  [] H2 Blocker,  [] Carafate,  [] Diet/Tube Feeds   Code Status Full Code   Disposition Patient requires continued admission due to COVID 19 PNA   MDM [] Low, [x] Moderate,[]  High     Electronically signed by Reynaldo Car MD on 11/28/2020 at 11:34 AM

## 2020-11-28 NOTE — PROGRESS NOTES
[address]  consulted by Dr. Arnold Del Valle for monitoring and adjustment. Indication for treatment: COVID-19 +  Goal trough: 15 mcg/mL  Other antimicrobials:  Merrem     Pertinent Laboratory Values:   Temp Readings from Last 3 Encounters:   11/28/20 100.6 °F (38.1 °C) (Rectal)   11/22/20 98.9 °F (37.2 °C)   11/18/20 98.9 °F (37.2 °C) (Oral)     Recent Labs     11/26/20  0520 11/27/20  0310 11/28/20  0500   WBC 21.8* 19.1* 23.0*     Recent Labs     11/27/20  0310 11/27/20  1241 11/28/20  0500   BUN 74* 79* 99*   CREATININE 1.8* 2.1* 2.1*     Estimated Creatinine Clearance: 37 mL/min (A) (based on SCr of 2.1 mg/dL (H)). Intake/Output Summary (Last 24 hours) at 11/28/2020 0852  Last data filed at 11/28/2020 0800  Gross per 24 hour   Intake 2985 ml   Output 1510 ml   Net 1475 ml       Pertinent Cultures:  Date    Source    Results  11/28   Blood    Ordered   11/28    Respiratory   Ordered     Vancomycin level:   TROUGH:    Recent Labs     11/25/20  1109   VANCOTROUGH 4.8*     RANDOM:    Recent Labs     11/27/20  0310   VANCORANDOM 16.3       Assessment:  WBC and temperature: WBC elevated, Tmax 100.6F  SCr, BUN, and urine output: MICHELLE, SCr @ 2.1  Day(s) of therapy: # 1 (restart 11/28)  Vancomycin level: to be collected. Plan:  Dosing comments: started patient on Vancomycin 1500mg IVPB x 1 dose today. MICHELLE:  SCr @ 2.1, intermittent dosing is appropriate at this time. Random level ordered for tomorrow AM  Pharmacy will continue to monitor patient and adjust therapy as indicated    REPEAT VANCOMYCIN TROUGH SCHEDULED FOR 11/29 @ 0930. Thank you for the consult.   Michelle Villaseñor, [address]  11/28/2020 8:52 AM

## 2020-11-28 NOTE — CONSULTS
Infectious Disease Consult Note  2020   Patient Name: Genesis Franz : 1945   Impression  COVID-19 pneumonia with acute hypoxic respiratory failure. Date of symptom onset:2020  Date of positive COVID-19 PCR: 2020  Exposure: unknown  Oxygen supplementation: intubated on mechanical ventilation  Bacterial infection: probable, elevated pct, its reduced  MICHELLE: after admission  Transaminitis:  COVID-associated Coagulopathy  Elevated D-dimer  Elevated proBNP   BMI: 30 kg/m2   Multi-morbidity: per PMHx HTN, HLD,   Plan:   Therapeutic: continue vancomycin and meropenem. D/c remdesivir. On methylprednisolon   Diagnostic: trend CRP, pct   F/u test: blood cx    Thank you for allowing me to consult in the care of this patient.  ------------------------  REASON FOR CONSULT: Infective syndrome   Requested by: Dr. Dayan Gaines is a 76 y.o.  male with HTN, HLD, GERD who was admitted 2020 for further evaluation and management of acute hypoxic respiratory failure secondary to COVID-19. Apparently tested positive for SARS-CoV-2 on 2020 at an ED visit. Review of notes show he stated symptoms started on 4 days prior to test: 2020. They were cough, congestion, fever, malaise and myalgia. He received ceftriaxone, azithromycin and dexamethasone. He was discharged with a 10 day prescription of Augmentin and a 6 day course of azithromycin. He did not improve, returning to the ED on  with complaints of SOB and pulse oximetry reading of 84%. He required HFNO- Vapotherm. He was transferred from [place] to [facility]. Vancomycin , cefepime, remdesivir, coplasma and dexamethasone was given. He was intubated on   ? Infectious diseases service was consulted to evaluate the pt, and recommend further investigative and therapeutic measures.   Review and summary of old records:  ROS: Other systems reviewed Including eyes, ENT, respiratory, cardiovascular, GI, , dermatologic, neurologic, psych, hem/lymphatic, musculoskeletal and endocrine were negative other than what is mentioned above. Unable to obtain; pt on vent  Patient Active Problem List    Diagnosis Date Noted    MICHELLE [facility] injury) ([facility] [address]     Electrolyte imbalance     Isolated proteinuria with minor glomerular abnormality     Volume depletion     Acute respiratory failure with hypoxia ([facility] [address] 11/22/2020    KISHORE on CPAP 06/19/2017     Past Medical History:   Diagnosis Date    GERD (gastroesophageal reflux disease)     Hyperlipidemia     Hypertension       Past Surgical History:   Procedure Laterality Date    APPENDECTOMY        No family history on file. Infectious disease related family history - not contibutory. SOCIAL HISTORY  Social History     Tobacco Use    Smoking status: Never Smoker    Smokeless tobacco: Never Used   Substance Use Topics    Alcohol use: Never     Frequency: Never       Born:   Lived   Occupation:   No recent travel of significance.  No recent unusual exposures.  NO pets    ? ALLERGIES  No Known Allergies   MEDICATIONS  Reviewed and are per the chart/EMR. IMMUNIZATION HISTORY    There is no immunization history on file for this patient. ? Antibiotics:   Vancomycin  Meropenem  ?  -------------------------------------------------------------------------------------------------------------------    Vital Signs:  Vitals:    11/28/20 1203   BP: 102/63   Pulse: 72   Resp: 23   Temp: 98.6 °F (37 °C)   SpO2: 91%         Exam:    VS: noted; wt   Gen: intubated and mechanically ventilated  Skin: no stigmata of endocarditis  Wounds: C/D/I  HEMT: AT/NC ETT  Eyes: PERRLA, EOMI, conjunctiva pink, sclera anicteric. Neck: Supple. Trachea midline. No LAD. Chest: transmitted breath sounds  Heart: RRR and no MRG. Abd: soft, non-distended, no tenderness, no hepatomegaly. Normoactive bowel sounds.   Ext: no clubbing, cyanosis, or edema  Catheter Site: right arm PICC line without erythema or tenderness  LDA:   Neuro: Mental status intact. CN 2-12 intact and no focal sensory or motor deficits    ? Diagnostic Studies: reviewed  ? ? I have examined this patient and available medical records on this date and have made the above observations, conclusions and recommendations.   Electronically signed by: Electronically signed by Beatrice Willson MD on 11/28/2020 at 1:22 PM

## 2020-11-29 NOTE — PROGRESS NOTES
Pulmonary and Critical Care  Progress Note    Subjective: The patient is sedated on vent. Shortness of breath none  Chest pain none  Addressing respiratory complaints Patient is negative for  hemoptysis and cyanosis  CONSTITUTIONAL:  negative for fevers and chills      Past Medical History:     has a past medical history of GERD (gastroesophageal reflux disease), Hyperlipidemia, and Hypertension. has a past surgical history that includes Appendectomy. reports that he has never smoked. He has never used smokeless tobacco. He reports that he does not drink alcohol or use drugs. Family history:  family history is not on file. No Known Allergies  Social History:    Reviewed; no changes    Objective:   PHYSICAL EXAM:        VITALS:  BP (!) 81/57   Pulse 68   Temp 98.4 °F (36.9 °C) (Rectal)   Resp 20   Ht 5' 11\" (1.803 m)   Wt 221 lb 12.5 oz (100.6 kg)   SpO2 92%   BMI 30.93 kg/m²     24HR INTAKE/OUTPUT:      Intake/Output Summary (Last 24 hours) at 11/29/2020 1430  Last data filed at 11/29/2020 1202  Gross per 24 hour   Intake 3481.37 ml   Output 900 ml   Net 2581.37 ml       CONSTITUTIONAL:  somnolent  LUNGS:  decreased breath sounds, basilar crackles. CARDIOVASCULAR:  normal S1 and S2 and negative JVD  ABD:Abdomen soft, non-tender. BS normal. No masses,  No organomegaly.   DATA:    CBC:  Recent Labs     11/27/20  0310 11/28/20  0500 11/29/20  0550   WBC 19.1* 23.0* 33.8*   RBC 3.59* 3.76* 3.98*   HGB 11.7* 11.8* 12.5*   HCT 34.9* 36.6* 39.8*    184 197   MCV 97.2 97.3 100.0   MCH 32.6* 31.4* 31.4*   MCHC 33.5 32.2 31.4*   RDW 13.8 13.9 14.2   SEGSPCT 67.0* 83.0* 94.0*   BANDSPCT 12* 10 2*      BMP:  Recent Labs     11/27/20  1241 11/28/20  0500 11/29/20  0550    140 139   K 4.5 4.2 4.3    105 106   CO2 24 22 17*   BUN 79* 99* 109*   CREATININE 2.1* 2.1* 2.5*   CALCIUM 7.5* 7.5* 7.0*   GLUCOSE 117* 109* 140*      ABG:  Recent Labs     11/27/20  0600 11/28/20  0600 11/29/20  0600   PH

## 2020-11-29 NOTE — PROGRESS NOTES
Hospitalist Progress Note      Name:  Traci Andujar /Age/Sex: 1945  (76 y.o. male)   MRN & CSN:  [id number] & [id number] Admission Date/Time: 2020  3:03 AM   Location:  -A PCP: Alesha Kohler MD         Hospital Day: 7    History of Present Illness:     Chief Complaint: Traci Andujar is a 76 y.o.  male  who presents with SOB     The patient seen and examined at bed side. He remained intubated and sedated. Ten point ROS reviewed negative, unless as noted above    Objective: Intake/Output Summary (Last 24 hours) at 2020 1109  Last data filed at 2020 0830  Gross per 24 hour   Intake 3481.37 ml   Output 800 ml   Net 2681.37 ml      Vitals:   Vitals:    20 1035   BP:    Pulse: 71   Resp: 21   Temp:    SpO2: (!) 83%     Physical Exam:   General Appearance: Intubated and sedated. Cardiovascular: normal rate, regular rhythm, normal S1 and S2  Pulmonary/Chest: Decreased breath sounds bilaterally with ronchi  Abdomen: soft, non-tender, non-distended, normal bowel sounds, no masses   Extremities: no cyanosis, clubbing or edema, pulse   Skin: warm and dry, no rash or erythema  Head: normocephalic and atraumatic  Eyes: pupils equal, round, and reactive to light  Neck: supple and non-tender without mass, no thyromegaly   Musculoskeletal: normal range of motion, no joint swelling, deformity or tenderness  Neurological: Intubated and sedated. Following commands.  NFD    Medications:   Medications:    vancomycin (VANCOCIN) intermittent dosing (placeholder)   Other RX Placeholder    meropenem  1 g Intravenous Q12H    methylPREDNISolone  40 mg Intravenous Daily    sodium chloride flush  10 mL Intravenous 2 times per day    albuterol sulfate HFA  4 puff Inhalation Q4H    And    ipratropium  4 puff Inhalation Q4H    chlorhexidine  15 mL Mouth/Throat BID    famotidine (PEPCID) injection  20 mg Intravenous BID    lidocaine PF  5 mL Intradermal See Admin Instructions  sodium chloride flush  10 mL Intravenous 2 times per day    sodium chloride flush  10 mL Intravenous 2 times per day    enoxaparin  40 mg Subcutaneous Daily    vitamin D  2,000 Units Oral Daily    atorvastatin  40 mg Oral Daily      Infusions:    sodium chloride 100 mL/hr at 11/29/20 0601    fentanyl 200 mcg/hr (11/29/20 0927)    propofol 65 mcg/kg/min (11/29/20 1013)     PRN Meds: potassium chloride, 10 mEq, PRN  sodium chloride flush, 10 mL, PRN  hydrOXYzine, 50 mg, Q6H PRN  sodium chloride, 30 mL, PRN  sodium chloride flush, 10 mL, PRN  sodium chloride flush, 10 mL, PRN  acetaminophen, 650 mg, Q6H PRN    Or  acetaminophen, 650 mg, Q6H PRN  polyethylene glycol, 17 g, Daily PRN  promethazine, 12.5 mg, Q6H PRN    Or  ondansetron, 4 mg, Q6H PRN  guaiFENesin-dextromethorphan, 5 mL, Q4H PRN            Pertinent New Labs & Imaging Studies     CBC with Differential:    Lab Results   Component Value Date    WBC 33.8 11/29/2020    RBC 3.98 11/29/2020    HGB 12.5 11/29/2020    HCT 39.8 11/29/2020     11/29/2020    .0 11/29/2020    MCH 31.4 11/29/2020    MCHC 31.4 11/29/2020    RDW 14.2 11/29/2020    SEGSPCT 94.0 11/29/2020    BANDSPCT 2 11/29/2020    LYMPHOPCT 1.0 11/29/2020    PROMYELOPCT 9 11/27/2020    MONOPCT 1.0 11/29/2020    MYELOPCT 1 11/29/2020    BASOPCT 0.2 11/24/2020    MONOSABS 0.3 11/29/2020    LYMPHSABS 0.3 11/29/2020    EOSABS 0.0 11/24/2020    BASOSABS 0.0 11/24/2020    DIFFTYPE MANUAL DIFFERENTIAL 11/29/2020     CMP:    Lab Results   Component Value Date     11/29/2020    K 4.3 11/29/2020     11/29/2020    CO2 17 11/29/2020     11/29/2020    CREATININE 2.5 11/29/2020    GFRAA 31 11/29/2020    LABGLOM 25 11/29/2020    GLUCOSE 140 11/29/2020    PROT 6.2 11/29/2020    LABALBU 3.2 11/29/2020    CALCIUM 7.0 11/29/2020    BILITOT 0.4 11/29/2020    ALKPHOS 146 11/29/2020    AST 53 11/29/2020    ALT 33 11/29/2020     No results found.     Assessment and Plan:   Reji Palencia

## 2020-11-29 NOTE — PROGRESS NOTES
Nephrology Progress Note  11/29/2020 5:05 PM  Subjective:   Admit Date: 11/23/2020  PCP: Juan Anderson MD  Interval History: intubated  urine output down  BP down  Diet: DIET TUBE FEED CONTINUOUS/CYCLIC NPO; Low Calorie High Protein (Vital HP); Nasogastric; Continuous; 25; 60; 24      Data:   Scheduled Meds:   vancomycin (VANCOCIN) intermittent dosing (placeholder)   Other RX Placeholder    furosemide  20 mg Intravenous Once    albumin human  12.5 g Intravenous Once    meropenem  1 g Intravenous Q12H    methylPREDNISolone  40 mg Intravenous Daily    sodium chloride flush  10 mL Intravenous 2 times per day    albuterol sulfate HFA  4 puff Inhalation Q4H    And    ipratropium  4 puff Inhalation Q4H    chlorhexidine  15 mL Mouth/Throat BID    famotidine (PEPCID) injection  20 mg Intravenous BID    lidocaine PF  5 mL Intradermal See Admin Instructions    sodium chloride flush  10 mL Intravenous 2 times per day    sodium chloride flush  10 mL Intravenous 2 times per day    enoxaparin  40 mg Subcutaneous Daily    vitamin D  2,000 Units Oral Daily    atorvastatin  40 mg Oral Daily     Continuous Infusions:   sodium bicarbonate infusion      fentanyl 200 mcg/hr (11/29/20 1406)    propofol 65 mcg/kg/min (11/29/20 1527)     PRN Meds:potassium chloride, sodium chloride flush, hydrOXYzine, sodium chloride, sodium chloride flush, sodium chloride flush, acetaminophen **OR** acetaminophen, polyethylene glycol, promethazine **OR** ondansetron, guaiFENesin-dextromethorphan  I/O last 3 completed shifts:   In: 3481.4 [I.V.:1341.4; NG/GT:840; IV [id number]:1540]  Out: 018 [Urine:905]  I/O this shift:  In: 2220.6 [I.V.:1455.6; NG/GT:415; IV Piggyback:350]  Out: -     Intake/Output Summary (Last 24 hours) at 11/29/2020 1705  Last data filed at 11/29/2020 1620  Gross per 24 hour   Intake 5581.96 ml   Output 905 ml   Net 4676.96 ml     CBC:   Recent Labs     11/27/20  0310 11/28/20  0500 11/29/20  0550   WBC 19.1* 23.0* 33.8*   HGB 11.7* 11.8* 12.5*    184 197     BMP:    Recent Labs     11/27/20  1241 11/28/20  0500 11/29/20  0550    140 139   K 4.5 4.2 4.3    105 106   CO2 24 22 17*   BUN 79* 99* 109*   CREATININE 2.1* 2.1* 2.5*   GLUCOSE 117* 109* 140*     Troponin: No results for input(s): TROPONINI in the last 72 hours. BNP: No results for input(s): BNP in the last 72 hours. Lipids: No results for input(s): CHOL, HDL in the last 72 hours. Invalid input(s): LDLCALCU  ABGs:   Lab Results   Component Value Date    PO2ART 56 11/29/2020    CKL1TAN 42.0 11/29/2020       Objective:   Vitals: BP (!) 86/53   Pulse 64   Temp 97.7 °F (36.5 °C) (Rectal)   Resp 18   Ht 5' 11\" (1.803 m)   Wt 221 lb 12.5 oz (100.6 kg)   SpO2 94%   BMI 30.93 kg/m²       Assessment and Plan:     Patient Active Problem List:     KISHORE on CPAP     Acute respiratory failure with hypoxia (HCC)     MICHELLE (acute kidney injury) ([facility] [address]     Electrolyte imbalance     Isolated proteinuria with minor glomerular abnormality     Volume depletion  MICHELLE  With disproportionate BUN from volume depletion and steroids  Acidosis  circ shock  Plan     Change IVF to bicarb drip at 75  Loop stress test with albumin  Prognosis I spoor    Elisabeth Antonio MD  Due to the current efforts to prevent transmission of COVID-19 and also the need to preserve PPE for other caregivers, a face-to-face encounter with the patient was not performed. That being said, all relevant records and diagnostic tests were reviewed, including laboratory results and imaging. Please reference any relevant documentation elsewhere. Care will be coordinated by the primary service.

## 2020-11-29 NOTE — PLAN OF CARE
Post care note    0'\"   Vincent Seeds - 11/28/20 1:00 PM   [phone] Hospital or Facility: [facility] Ultrasound From: Ye Whitley RE: Tabatha Ralphs RM: 2001 Good Afternoon, I wanted to clarify an US order that was placed for this patient. A Bilateral upper extremity vein mapping was ordered but then says to rule out DVT. Did you truly want a vein mapping or did you want a venous study? Please advise accordingly. Thank you, Ye Whitley Need Callback: NO CALLBACK REQ  Read 1:01 PM   0'\"   Stan Miels MD - 11/28/20 1:02 PM   I am sorry it is lower extremity DVT US. Read 1:02 PM   0'\"   Vincent Ashley - 11/28/20 1:05 PM   It's okay. I just wanted to make sure that I performed the correct test. Did you need both legs scanned or just RT or LT? Is the patient having swelling, pain, or redness? Read 1:05 PM   0'\"   Stan Miles MD - 11/28/20 1:05 PM   Patient has fever its rule out with bilateral US  Read 1:05 PM   0'\"   Vincent Ramirez - 11/28/20 1:07 PM   Okay. I will go up to scan him as soon as I am able. Thank you. Read 1:08 PM   0'\"   Stan Miles MD - 11/28/20 1:08 PM   ??   Read 1:08 PM

## 2020-11-29 NOTE — PROGRESS NOTES
Suctioned out copious amount of pink frothy sputum. Increased fio2 to 90 and peep to 12. Saturations around 91 percent. Nursing informed.

## 2020-11-29 NOTE — PROGRESS NOTES
11/29/20 0324   Vent Information   Equipment Changed HME   Vent Mode AC/PC   Vt Ordered 0 mL   Rate Set 16 bmp   Peak Flow 0 L/min   Pressure Support 0 cmH20   FiO2  100 %   SpO2 91 %   SpO2/FiO2 ratio 91   Sensitivity 3   PEEP/CPAP 12   I Time/ I Time % 1 s   Humidification Source HME   Vent Patient Data   High Peep/I Pressure 24   Peak Inspiratory Pressure 39 cmH2O   Mean Airway Pressure 28 cmH20   Rate Measured 27 br/min   Vt Exhaled 845 mL   Minute Volume 22.1 Liters   I:E Ratio 1.40:1   Cough/Sputum   Sputum How Obtained Endotracheal;Suctioned   Sputum Amount Moderate   Sputum Color   (pink frothy)   Spontaneous Breathing Trial (SBT) RT Doc   Pulse 86   Additional Respiratory  Assessments   Resp 27   Alarm Settings   High Pressure Alarm 50 cmH2O   Delay Alarm 20 sec(s)   Low Minute Volume Alarm 2.5 L/min   Apnea (secs) 20 secs   High Respiratory Rate 40 br/min   Low Exhaled Vt  250 mL   ETT (adult)   Placement Date/Time: 11/25/20 1750   Timeout: Patient;Procedure; Appropriate Equipment  Preoxygenation: Yes  Tube Size: 7.5 mm  Location: Oral  Insertion attempts: 1  Secured at: (c) 24 cm  Placed By: (c) Other (Comment)   Secured at 26 cm   Measured From Lips   ET Placement Left   Secured By Commercial tube jones   Site Condition Cool;Dry   Cuff Pressure   (mlt)

## 2020-11-29 NOTE — PROGRESS NOTES
11/28/20 2242   Vent Information   Vent Mode AC/PC   Vt Ordered 0 mL   Rate Set 16 bmp   Peak Flow 0 L/min   Pressure Support 0 cmH20   FiO2  90 %   SpO2 94 %   SpO2/FiO2 ratio 104.44   Sensitivity 3   PEEP/CPAP 12   I Time/ I Time % 1 s   Humidification Source HME   Vent Patient Data   High Peep/I Pressure 24   Peak Inspiratory Pressure 37 cmH2O   Mean Airway Pressure 25 cmH20   Rate Measured 23 br/min   Vt Exhaled 810 mL   Minute Volume 19 Liters   I:E Ratio 1.00:1   Plateau Pressure 33 [id number]   Static Compliance 38 mL/cmH2O   Cough/Sputum   Sputum How Obtained Endotracheal;Suctioned   Sputum Amount None   Spontaneous Breathing Trial (SBT) RT Doc   Pulse 61   Breath Sounds   Right Upper Lobe Diminished   Right Middle Lobe Diminished   Right Lower Lobe Diminished   Left Upper Lobe Diminished   Left Lower Lobe Diminished   Additional Respiratory  Assessments   Resp 24   Alarm Settings   High Pressure Alarm 50 cmH2O   Delay Alarm 20 sec(s)   Apnea (secs) 20 secs   High Respiratory Rate 40 br/min   Low Exhaled Vt  250 mL

## 2020-11-30 NOTE — SIGNIFICANT EVENT
Significant event documentation: around 0100    Reason for call/time of call:   Rapid response for hypoxia and bradycardia    Physical Exam:   Vitals:    11/29/20 2315   BP: (!) 110/55   Pulse: 83   Resp: 19   Temp:    SpO2:       Mental status: sedated and paralyzed on rotoprone bed  CV:bradycardic rate 30  Lungs: diminished throughout    Interventions/orders placed:  RR called due to bradycardia and faint pulse. Patient never lost a pulse. Patient had just been placed from being manually proned to supine on rotoprone bed, patient had lots of respiratory secretions and the patient's ET tube became slightly dislodged and moved. HR dropped to 30 and patient became hypoxic in 46s. Atropine 0.5 mg was given x 1 with good response with HR improving to 110s. ET tube was secured. CXR was obtained and is pending. Patient was proned and large amount of drainage came out nose both mucus and tube feed and during suction, mucus plugs were suctioned. Patient's oxygen improved to 95% with pronation. Dr. Marquis Self present and in agreement.       Lucrecia Nelson PA-C  Hospitalist

## 2020-11-30 NOTE — PROGRESS NOTES
11/29/20 2310   Vent Information   Vent Mode AC/PC   Vt Ordered 0 mL   Rate Set 20 bmp   Peak Flow 0 L/min   Pressure Support 0 cmH20   FiO2  100 %   SpO2 99 %   SpO2/FiO2 ratio 99   Sensitivity 3   PEEP/CPAP 15   I Time/ I Time % 1 s   Humidification Source HME   Vent Patient Data   High Peep/I Pressure 24   Peak Inspiratory Pressure 39 cmH2O   Mean Airway Pressure 28 cmH20   Rate Measured 22 br/min   Vt Exhaled 1093 mL   Minute Volume 18.3 Liters   I:E Ratio 1:1.20   Plateau Pressure 43 [id number]   Static Compliance 41 mL/cmH2O   Cough/Sputum   Sputum How Obtained Endotracheal;Suctioned   Frequency Frequent   Sputum Amount Moderate   Sputum Color Clear  (pink tinged)   Spontaneous Breathing Trial (SBT) RT Doc   Pulse 84   Additional Respiratory  Assessments   Resp 20   Alarm Settings   High Pressure Alarm 50 cmH2O   Delay Alarm 20 sec(s)   Low Minute Volume Alarm 2.5 L/min   Apnea (secs) 20 secs   High Respiratory Rate 40 br/min   Low Exhaled Vt  250 mL   ETT (adult)   Placement Date/Time: 11/25/20 1750   Timeout: Patient;Procedure; Appropriate Equipment  Preoxygenation: Yes  Tube Size: 7.5 mm  Location: Oral  Insertion attempts: 1  Secured at: (c) 24 cm  Placed By: (c) Other (Comment)   Secured at 26 cm   Measured From Lips   ET Placement Left   Secured By Commercial tube jones   Site Condition Cool;Dry   Cuff Pressure   (mlt)

## 2020-11-30 NOTE — PROGRESS NOTES
11/30/20 0446   Vent Information   Vent Mode AC/PC   Vt Ordered 0 mL   Rate Set 20 bmp   Peak Flow 0 L/min   Pressure Support 0 cmH20   FiO2  100 %   Sensitivity 3   PEEP/CPAP 15   I Time/ I Time % 1 s   Humidification Source HME   Vent Patient Data   High Peep/I Pressure 24   Peak Inspiratory Pressure 39 cmH2O   Mean Airway Pressure 24 cmH20   Rate Measured 20 br/min   Vt Exhaled 791 mL   Minute Volume 16 Liters   I:E Ratio 1:1.90   Spontaneous Breathing Trial (SBT) RT Doc   Pulse 87   Alarm Settings   High Pressure Alarm 50 cmH2O   Delay Alarm 20 sec(s)   Low Minute Volume Alarm 2.5 L/min   Apnea (secs) 20 secs   High Respiratory Rate 40 br/min   Low Exhaled Vt  250 mL

## 2020-11-30 NOTE — PROGRESS NOTES
Infectious Disease Progress Note  2020   Patient Name: Pako Prince : 1945       Reason for visit: F/u COVID-19 pneumonia, acute respiratory failure? History:? Interval history noted, in rotoprone bed  Intubated, sedated and on mechanical ventilation  Physical Exam:  Vital Signs: /75   Pulse 87   Temp 96.8 °F (36 °C) (Rectal)   Resp 20   Ht 5' 11\" (1.803 m)   Wt 221 lb 12.5 oz (100.6 kg)   SpO2 97%   BMI 30.93 kg/m²     Gen: intubated and sedated, in Rotoprone bed. Skin: no stigmata of endocarditis  Wounds: C/D/I  HEMT: AT/NC ETT  Eyes: PERRL   Neck: Supple. Trachea midline. No LAD. Chest: Deferred as use of PAPR does not allow for auscultation. Heart: Deferred as use of PAPR does not allow for auscultation. Abd: soft, non-distended, no tenderness, no hepatomegaly. Normoactive bowel sounds. Ext: no clubbing, cyanosis, or edema  Catheter Site: without erythema or tenderness  LDA: CVC: RA PICC line, Urethral catheter :  Neuro: sedated and on the ventilator. Radiologic / Imaging / TESTING  CXR 2020  Impression:   Left greater than right bilateral airspace opacities are noted without   significant change.         Labs:    Recent Results (from the past 24 hour(s))   CBC Auto Differential    Collection Time: 20  6:03 AM   Result Value Ref Range    WBC 36.1 (HH) 4.0 - 10.5 K/CU MM    RBC 3.76 (L) 4.6 - 6.2 M/CU MM    Hemoglobin 12.0 (L) 13.5 - 18.0 GM/DL    Hematocrit 37.3 (L) 42 - 52 %    MCV 99.2 78 - 100 FL    MCH 31.9 (H) 27 - 31 PG    MCHC 32.2 32.0 - 36.0 %    RDW 14.6 11.7 - 14.9 %    Platelets 367 (L) 363 - 440 K/CU MM    MPV 10.1 7.5 - 11.1 FL    Myelocyte Percent 1 (H) 0.0 %    Metamyelocytes Relative 1 (H) 0.0 %    Bands Relative 5 5 - 11 %    Segs Relative 91.0 (H) 36 - 66 %    Monocytes % 2.0 0 - 4 %    nRBC 1     Myelocytes Absolute 0.36 K/CU MM    Metamyelocytes Absolute 0.36 K/CU MM    Bands Absolute 1.81 K/CU MM    Segs Absolute 32.9 K/CU MM Monocytes Absolute 0.7 K/CU MM    Differential Type MANUAL DIFFERENTIAL     Toxic Granulation PRESENT     WBC Morphology VACUOLATED NEUTROPHILS (SEGS)    Comprehensive Metabolic Panel w/ Reflex to MG    Collection Time: 11/30/20  6:03 AM   Result Value Ref Range    Sodium 137 135 - 145 MMOL/L    Potassium 4.7 3.5 - 5.1 MMOL/L    Chloride 99 99 - 110 mMol/L    CO2 16 (L) 21 - 32 MMOL/L     (H) 6 - 23 MG/DL    CREATININE 3.7 (H) 0.9 - 1.3 MG/DL    Glucose 153 (H) 70 - 99 MG/DL    Calcium 6.7 (LL) 8.3 - 10.6 MG/DL    Alb 3.0 (L) 3.4 - 5.0 GM/DL    Total Protein 6.0 (L) 6.4 - 8.2 GM/DL    Total Bilirubin 0.4 0.0 - 1.0 MG/DL    ALT 50 (H) 10 - 40 U/L    AST 92 (H) 15 - 37 IU/L    Alkaline Phosphatase 175 (H) 40 - 128 IU/L    GFR Non- 16 (L) >60 mL/min/1.73m2    GFR  19 (L) >60 mL/min/1.73m2    Anion Gap 22 (H) 4 - 16   Protime-INR    Collection Time: 11/30/20  6:03 AM   Result Value Ref Range    Protime 15.2 (H) 11.7 - 14.5 SECONDS    INR 1.25 INDEX   APTT    Collection Time: 11/30/20  6:03 AM   Result Value Ref Range    aPTT 31.2 25.1 - 37.1 SECONDS   Fibrinogen    Collection Time: 11/30/20  6:03 AM   Result Value Ref Range    Fibrinogen 194 (L) 196.9 - 442.1 MG/DL   D-Dimer, Quantitative    Collection Time: 11/30/20  6:03 AM   Result Value Ref Range    D-Dimer, Quant >5250 (H) <230 NG/mL(DDU)   C-Reactive Protein    Collection Time: 11/30/20  6:03 AM   Result Value Ref Range    CRP, High Sensitivity 240.6 mg/L     CULTURE results: Invalid input(s): BLOOD CULTURE,  URINE CULTURE, SURGICAL CULTURE    Diagnosis:  Patient Active Problem List   Diagnosis    KISHORE on CPAP    Acute respiratory failure with hypoxia (HCC)    MICHELLE [facility]) ([facility] [address]    Electrolyte imbalance    Isolated proteinuria with minor glomerular abnormality    Volume depletion    Acute kidney injury (MICHELLE) with acute tubular necrosis (ATN) (HCC)    Acidosis       Active Problems  Active Problems: Acute respiratory failure with hypoxia (HCC)    MICHELLE (acute kidney injury) ([facility] [address]    Electrolyte imbalance    Isolated proteinuria with minor glomerular abnormality    Volume depletion    Acute kidney injury (MICHELLE) with acute tubular necrosis (ATN) ([facility] [address]    Acidosis  Resolved Problems:    * No resolved hospital problems. *      Impression and plan   Summary and rationale: Patient is a 76 y.o.   male critical COVID-19 pneumonia with acute kidney injury, transaminitis, Covid associated coagulopathy   Clinical status: Slight reduction in FiO2 to 90%, renal status is worsening as creatinine is up to 3.7.    Therapeutic:  o Ongoing antibiotics: Meropenem 11/28-  o Antiviral agent: remdesivir 11/24-28  o Anti-inflammatory agents:  - Dexamethasone: 11/22-24  - Solu-Medrol: 11/25-  o Completed antibiotics:   o Convalescent plasma receipt:  o Other agents:    Diagnostic:   F/u: Interleukin-6, beta-1 3D glucan ,blood culture 11/28, 0/2   Other:      Electronically signed by: Electronically signed by Kaitlynn Mccabe MD on 11/30/2020 at 10:14 AM

## 2020-11-30 NOTE — CODE DOCUMENTATION
Petr respiratory therapist replacing components on the vent's suction system, and was able to remove a lot of this secretions, which improved this patient's sats to 80's.

## 2020-11-30 NOTE — CODE DOCUMENTATION
0.5 mg Atropine given and flushed by Delfina Ford. Patient's HR improved from 30's to 110 almost immediately with a strong pulse.

## 2020-11-30 NOTE — PROGRESS NOTES
Nephrology Progress Note        [address], [address] [place] [address]  Phone: [phone]  Office Hours: 8:30AM - 4:30PM  Monday - Friday 11/30/2020 8:44 AM  Subjective:   Admit Date: 11/23/2020  PCP: Tricia Walters MD  Interval History: anuric despite lasix gtt  Intubated     Diet: DIET TUBE FEED CONTINUOUS/CYCLIC NPO; Low Calorie High Protein (Vital HP); Nasogastric; Continuous; 25; 60; 24      Data:   Scheduled Meds:   calcium gluconate IVPB  4 g Intravenous Once    meropenem  1 g Intravenous Q12H    methylPREDNISolone  40 mg Intravenous Daily    sodium chloride flush  10 mL Intravenous 2 times per day    albuterol sulfate HFA  4 puff Inhalation Q4H    And    ipratropium  4 puff Inhalation Q4H    chlorhexidine  15 mL Mouth/Throat BID    famotidine (PEPCID) injection  20 mg Intravenous BID    lidocaine PF  5 mL Intradermal See Admin Instructions    sodium chloride flush  10 mL Intravenous 2 times per day    sodium chloride flush  10 mL Intravenous 2 times per day    enoxaparin  40 mg Subcutaneous Daily    vitamin D  2,000 Units Oral Daily    atorvastatin  40 mg Oral Daily     Continuous Infusions:   sodium bicarbonate infusion      norepinephrine 5 mcg/min (11/29/20 2149)    cisatracurium (NIMBEX) infusion 2.5 mcg/kg/min (11/30/20 0130)    fentanyl 200 mcg/hr (11/30/20 6708)    propofol 80 mcg/kg/min (11/30/20 0636)     PRN Meds:atropine, potassium chloride, sodium chloride flush, hydrOXYzine, sodium chloride, sodium chloride flush, sodium chloride flush, acetaminophen **OR** acetaminophen, polyethylene glycol, promethazine **OR** ondansetron, guaiFENesin-dextromethorphan  I/O last 3 completed shifts: In: 4376.4 [I.V.:3391.4; NG/GT:535; IV Piggyback:450]  Out: 6717 [Urine:265; Emesis/NG output:1150]  No intake/output data recorded.     Intake/Output Summary (Last 24 hours) at 11/30/2020 0844  Last data filed at 11/30/2020 0603  Gross per 24 hour   Intake ill    I SPOKE WITH HIS WIFE KADIE, [phone], TO GET APPROVAL FOR TEMP HD CATH AND CRRT, SHE GAVE HER CONSULT, AND REPORT THAT SHE IS POA TOO                Electronically signed by Gurmeet Laureano DO on 11/30/2020 at 8:44 AM    [facility]  MD Krystina Osborn DO  [address],  [address]  [facility], [place] [address]  PHONE: [phone]  FAX: [phone]

## 2020-11-30 NOTE — PROGRESS NOTES
Hospitalist Progress Note      Name:  Shreyas Tuttle /Age/Sex: 1945  (76 y.o. male)   MRN & CSN:  [id number] & [id number] Admission Date/Time: 2020  3:03 AM   Location:  -A PCP: Mian Khan MD         Hospital Day: 8    History of Present Illness:     Chief Complaint: Shreyas Tuttle is a 76 y.o.  male  who presents with SOB     The patient seen and examined at bed side. He remained intubated and sedated. He is on rotor prone bed. Bradycardic overnight. Ten point ROS reviewed negative, unless as noted above    Objective: Intake/Output Summary (Last 24 hours) at 2020 1218  Last data filed at 2020 0603  Gross per 24 hour   Intake 4236.43 ml   Output 1315 ml   Net 2921.43 ml      Vitals:   Vitals:    20 1136   BP:    Pulse:    Resp:    Temp:    SpO2: 96%     Physical Exam:   General Appearance: Intubated and sedated. On rotor prone bed. Cardiovascular: normal rate, regular rhythm, normal S1 and S2  Pulmonary/Chest: Decreased breath sounds bilaterally with ronchi  Abdomen: soft, non-tender, non-distended, normal bowel sounds, no masses   Extremities: no cyanosis, clubbing or edema, pulse   Skin: warm and dry, no rash or erythema  Head: normocephalic and atraumatic  Eyes: pupils equal, round, and reactive to light  Neck: supple and non-tender without mass, no thyromegaly   Musculoskeletal: normal range of motion, no joint swelling, deformity or tenderness  Neurological: Intubated and sedated. Following commands.  NFD    Medications:   Medications:    calcium gluconate IVPB  4 g Intravenous Once    meropenem  1 g Intravenous Q12H    methylPREDNISolone  40 mg Intravenous Daily    sodium chloride flush  10 mL Intravenous 2 times per day    albuterol sulfate HFA  4 puff Inhalation Q4H    And    ipratropium  4 puff Inhalation Q4H    chlorhexidine  15 mL Mouth/Throat BID    famotidine (PEPCID) injection  20 mg Intravenous BID    lidocaine PF  5 mL Intradermal See Admin Instructions    sodium chloride flush  10 mL Intravenous 2 times per day    sodium chloride flush  10 mL Intravenous 2 times per day    enoxaparin  40 mg Subcutaneous Daily    vitamin D  2,000 Units Oral Daily    atorvastatin  40 mg Oral Daily      Infusions:    sodium bicarbonate infusion 50 mL/hr at 11/30/20 0941    norepinephrine 5 mcg/min (11/29/20 2149)    cisatracurium (NIMBEX) infusion 2.5 mcg/kg/min (11/30/20 1150)    fentanyl 200 mcg/hr (11/30/20 1214)    propofol 80 mcg/kg/min (11/30/20 1113)     PRN Meds: atropine, 0.5 mg, Once PRN  potassium chloride, 10 mEq, PRN  sodium chloride flush, 10 mL, PRN  hydrOXYzine, 50 mg, Q6H PRN  sodium chloride, 30 mL, PRN  sodium chloride flush, 10 mL, PRN  sodium chloride flush, 10 mL, PRN  acetaminophen, 650 mg, Q6H PRN    Or  acetaminophen, 650 mg, Q6H PRN  polyethylene glycol, 17 g, Daily PRN  promethazine, 12.5 mg, Q6H PRN    Or  ondansetron, 4 mg, Q6H PRN  guaiFENesin-dextromethorphan, 5 mL, Q4H PRN            Pertinent New Labs & Imaging Studies     CBC with Differential:    Lab Results   Component Value Date    WBC 36.1 11/30/2020    RBC 3.76 11/30/2020    HGB 12.0 11/30/2020    HCT 37.3 11/30/2020     11/30/2020    MCV 99.2 11/30/2020    MCH 31.9 11/30/2020    MCHC 32.2 11/30/2020    RDW 14.6 11/30/2020    NRBC 1 11/30/2020    SEGSPCT 91.0 11/30/2020    BANDSPCT 5 11/30/2020    LYMPHOPCT 1.0 11/29/2020    PROMYELOPCT 9 11/27/2020    MONOPCT 2.0 11/30/2020    MYELOPCT 1 11/30/2020    BASOPCT 0.2 11/24/2020    MONOSABS 0.7 11/30/2020    LYMPHSABS 0.3 11/29/2020    EOSABS 0.0 11/24/2020    BASOSABS 0.0 11/24/2020    DIFFTYPE MANUAL DIFFERENTIAL 11/30/2020     CMP:    Lab Results   Component Value Date     11/30/2020    K 4.7 11/30/2020    CL 99 11/30/2020    CO2 16 11/30/2020     11/30/2020    CREATININE 3.7 11/30/2020    GFRAA 19 11/30/2020    LABGLOM 16 11/30/2020    GLUCOSE 153 11/30/2020    PROT 6.0 11/30/2020

## 2020-11-30 NOTE — PROGRESS NOTES
Pneumonia  Non Calcified right sided Pulmonary nodules  COVID Pneumonia  MICHELLE worsening  AG Metabolic acidosis  Moderate malnutrition       1. CRRT per renal  2. CXR in am  3. Abx  4. F/u C&S  5. Levophed for a MAP > 65 mmhg  6. Inhalers  7. Solumderol  8. DVT and GI Prophylaxis  9. Prognosis guarded  10. C.w present management  No follow-ups on file.     Electronically signed by Demetrius Garcia MD on 11/30/2020 at 11:12 AM

## 2020-11-30 NOTE — PROGRESS NOTES
I turned this patient's tube feed off. I checked his residual and it was 1050. I placed him onto low intermit suction.

## 2020-11-30 NOTE — PLAN OF CARE
Problem: Airway Clearance - Ineffective  Goal: Achieve or maintain patent airway  Outcome: Ongoing     Problem: Gas Exchange - Impaired  Goal: Absence of hypoxia  Outcome: Ongoing  Goal: Promote optimal lung function  Outcome: Ongoing     Problem: Breathing Pattern - Ineffective  Goal: Ability to achieve and maintain a regular respiratory rate  Outcome: Ongoing     Problem:  Body Temperature -  Risk of, Imbalanced  Goal: Ability to maintain a body temperature within defined limits  Outcome: Ongoing  Goal: Will regain or maintain usual level of consciousness  Outcome: Ongoing  Goal: Complications related to the disease process, condition or treatment will be avoided or minimized  Outcome: Ongoing     Problem: Isolation Precautions - Risk of Spread of Infection  Goal: Prevent transmission of infection  Outcome: Ongoing     Problem: Nutrition Deficits  Goal: Optimize nutrtional status  Outcome: Ongoing     Problem: Risk for Fluid Volume Deficit  Goal: Maintain normal heart rhythm  Outcome: Ongoing  Goal: Maintain absence of muscle cramping  Outcome: Ongoing  Goal: Maintain normal serum potassium, sodium, calcium, phosphorus, and pH  Outcome: Ongoing     Problem: Loneliness or Risk for Loneliness  Goal: Demonstrate positive use of time alone when socialization is not possible  Outcome: Ongoing     Problem: Falls - Risk of:  Goal: Will remain free from falls  Description: Will remain free from falls  Outcome: Ongoing  Goal: Absence of physical injury  Description: Absence of physical injury  Outcome: Ongoing     Problem: Skin Integrity:  Goal: Will show no infection signs and symptoms  Description: Will show no infection signs and symptoms  Outcome: Ongoing  Goal: Absence of new skin breakdown  Description: Absence of new skin breakdown  Outcome: Ongoing

## 2020-11-30 NOTE — PROGRESS NOTES
Echo cannot be performed at this time. Patient is receiving rotoprone bed therapy that cannot be stopped for this exam. Please note echo was clicked as completed in Epic by the floor RN, but again, was not performed.

## 2020-11-30 NOTE — PROGRESS NOTES
1530, IR at the bedside with all intradepartmental staff ready for the flipping of the rotoprone. Temp dialysis catheter placed to the left groin area w/sterile dressing. Sutured in place. kub taken and line able to use.

## 2020-11-30 NOTE — PROGRESS NOTES
11/29/20 1957   Vent Information   Vent Mode AC/PC   Vt Ordered 0 mL   Rate Set 20 bmp   Peak Flow 0 L/min   Pressure Support 0 cmH20   FiO2  100 %   SpO2 91 %   SpO2/FiO2 ratio 91   Sensitivity 3   PEEP/CPAP 15   I Time/ I Time % 1 s   Humidification Source HME   Vent Patient Data   High Peep/I Pressure 24   Peak Inspiratory Pressure 40 cmH2O   Mean Airway Pressure 27 cmH20   Rate Measured 21 br/min   Vt Exhaled 1075 mL   Minute Volume 19.6 Liters   I:E Ratio 1:1.30   Cough/Sputum   Sputum How Obtained Endotracheal;Suctioned   Sputum Amount None   Spontaneous Breathing Trial (SBT) RT Doc   Pulse 64   Breath Sounds   Right Upper Lobe Diminished   Right Middle Lobe Diminished   Right Lower Lobe Diminished   Left Upper Lobe Diminished   Left Lower Lobe Diminished   Additional Respiratory  Assessments   Resp 23   Alarm Settings   High Pressure Alarm 50 cmH2O   Delay Alarm 20 sec(s)   Low Minute Volume Alarm 2.5 L/min   Apnea (secs) 20 secs   High Respiratory Rate 40 br/min   Low Exhaled Vt  250 mL

## 2020-11-30 NOTE — PROGRESS NOTES
Pharmacy Consult to renal dose medications per Dr. Dagoberto Tran ORDER(S):  Meropenem 1g IVPB q12h  Famotidine 20 mg IV BID  Enoxaparin 40 mg subQ daily    Estimated Creatinine Clearance: 21 mL/min (A) (based on SCr of 3.7 mg/dL (H)). Recent Labs     11/28/20  0500 11/29/20  0550 11/30/20  0603   BUN 99* 109* 133*   CREATININE 2.1* 2.5* 3.7*    197 139*   INR 1.10 1.20 1.25     Dosing Plan:  Meropenem 1g IVPB q8h while ordered on CRRT  Famotidine 10 mg IV BID while ordered on CRRT  Heparin is usually preferred in RRT; however, will plan to continue enoxaparin per Dr. Jovany Geronimo as patient is prone with Covid-19 and has limited subQ injection sites available that would be needed for TID heparin administration. Continue to monitor patient closely for any s/s bleeding. All other medications are adjusted properly per renal function at this time. Pharmacy will continue to follow and make dose adjustments as appropriate per RRT and/or CrCl estimate.     Thank you for the consult,    Tonya Gilford, PharmD, [facility]  __________________________________________________

## 2020-11-30 NOTE — PROGRESS NOTES
This patient who's HR has been running in the 90's, started bradying his HR back down into the 30's when the rotoprone bed rolled him on his left side down. Settings on rotoprone bed adjusted to avoid long periods on that side, and I wrote to Loretta WILLSON about getting a PRN order for Atropine so that we can keep some at bedside.

## 2020-11-30 NOTE — PROGRESS NOTES
5204, this nurse spoke to [facility] to confirm temp cath placement due to the patient being on the rotoprone bed. Waiting a return call from [facility].

## 2020-11-30 NOTE — ANESTHESIA PROCEDURE NOTES
Arterial Line:    An arterial line was placed using surface landmarks, in the ICU for the following indication(s): continuous blood pressure monitoring and blood sampling needed. A 20 gauge (size), 1 and 3/4 inch (length), (type) catheter was placed, Seldinger technique not used, into the left radial artery, secured by tape and Tegaderm. Events:  patient tolerated procedure well with no complications and EBL < 5mL. Additional notes:  Prone, sedated and intubated. Tolerated arterial line insertion without complication.    11/30/2020 11:08 AM11/30/2020 11:15 AM  Resident/CRNA: YANET Dorman CRNA  Performed: Resident/CRNA   Preanesthetic Checklist  Completed: patient identified, site marked, surgical consent, pre-op evaluation, timeout performed, IV checked, risks and benefits discussed, monitors and equipment checked, anesthesia consent given, oxygen available and patient being monitored

## 2020-12-01 NOTE — PLAN OF CARE
Problem: Airway Clearance - Ineffective  Goal: Achieve or maintain patent airway  12/1/2020 0916 by Ton Valle RN  Outcome: Ongoing  12/1/2020 0140 by Ilya Walters RN  Outcome: Ongoing     Problem: Gas Exchange - Impaired  Goal: Absence of hypoxia  12/1/2020 0916 by Ton Valle RN  Outcome: Ongoing  12/1/2020 0140 by Ilya Walters RN  Outcome: Ongoing  Goal: Promote optimal lung function  12/1/2020 0916 by Ton Valle RN  Outcome: Ongoing  12/1/2020 0140 by Ilya Walters RN  Outcome: Ongoing     Problem: Breathing Pattern - Ineffective  Goal: Ability to achieve and maintain a regular respiratory rate  12/1/2020 0916 by Ton Valle RN  Outcome: Ongoing  12/1/2020 0140 by Ilya Walters RN  Outcome: Ongoing     Problem:  Body Temperature -  Risk of, Imbalanced  Goal: Ability to maintain a body temperature within defined limits  12/1/2020 0916 by Ton Valle RN  Outcome: Ongoing  12/1/2020 0140 by Ilya Walters RN  Outcome: Ongoing  Goal: Will regain or maintain usual level of consciousness  12/1/2020 0916 by Ton Valle RN  Outcome: Ongoing  12/1/2020 0140 by Ilya Walters RN  Outcome: Ongoing  Goal: Complications related to the disease process, condition or treatment will be avoided or minimized  12/1/2020 0916 by Ton Valle RN  Outcome: Ongoing  12/1/2020 0140 by Ilya Walters RN  Outcome: Ongoing     Problem: Isolation Precautions - Risk of Spread of Infection  Goal: Prevent transmission of infection  12/1/2020 0916 by Ton Valle RN  Outcome: Ongoing  12/1/2020 0140 by Ilya Walters RN  Outcome: Ongoing     Problem: Nutrition Deficits  Goal: Optimize nutrtional status  12/1/2020 0916 by Ton Valle RN  Outcome: Ongoing  12/1/2020 0140 by Ilya Walters RN  Outcome: Ongoing     Problem: Risk for Fluid Volume Deficit  Goal: Maintain normal heart rhythm  12/1/2020 0916 by Ton Valle RN  Outcome: Ongoing  12/1/2020 0140 by Ilya Walters RN  Outcome: Ongoing  Goal: Maintain absence of muscle cramping  12/1/2020 0916 by Olive Little RN  Outcome: Ongoing  12/1/2020 0140 by Felicia Sellers RN  Outcome: Ongoing  Goal: Maintain normal serum potassium, sodium, calcium, phosphorus, and pH  12/1/2020 0916 by Olive Little RN  Outcome: Ongoing  12/1/2020 0140 by Felicia Sellers RN  Outcome: Ongoing     Problem: Loneliness or Risk for Loneliness  Goal: Demonstrate positive use of time alone when socialization is not possible  12/1/2020 0916 by Olive Little RN  Outcome: Ongoing  12/1/2020 0140 by Felicia Sellers RN  Outcome: Ongoing     Problem: Fatigue  Goal: Verbalize increase energy and improved vitality  12/1/2020 0916 by Olive Little RN  Outcome: Ongoing  12/1/2020 0140 by Felicia Sellers RN  Outcome: Ongoing     Problem: Patient Education: Go to Patient Education Activity  Goal: Patient/Family Education  12/1/2020 6602 by Olive Little RN  Outcome: Ongoing  12/1/2020 0140 by Felicia Sellers RN  Outcome: Ongoing     Problem: Falls - Risk of:  Goal: Will remain free from falls  Description: Will remain free from falls  12/1/2020 0916 by Olive Little RN  Outcome: Ongoing  12/1/2020 0140 by Felicia Sellers RN  Outcome: Ongoing  Goal: Absence of physical injury  Description: Absence of physical injury  12/1/2020 0916 by Olive Little RN  Outcome: Ongoing  12/1/2020 0140 by Felicia Sellers RN  Outcome: Ongoing     Problem: Skin Integrity:  Goal: Will show no infection signs and symptoms  Description: Will show no infection signs and symptoms  12/1/2020 0916 by Olive Little RN  Outcome: Ongoing  12/1/2020 0140 by Felicia Sellers RN  Outcome: Ongoing  Goal: Absence of new skin breakdown  Description: Absence of new skin breakdown  12/1/2020 0916 by Olive Little RN  Outcome: Ongoing  12/1/2020 0140 by Felicia Sellers RN  Outcome: Ongoing     Problem: Discharge Planning:  Goal: Participates in care planning  Description: Participates in care planning  Outcome: Ongoing  Goal: Discharged to appropriate level of care  Description: Discharged to appropriate level of care  Outcome: Ongoing     Problem: Airway Clearance - Ineffective:  Goal: Ability to maintain a clear airway will improve  Description: Ability to maintain a clear airway will improve  Outcome: Ongoing     Problem: Anxiety/Stress:  Goal: Level of anxiety will decrease  Description: Level of anxiety will decrease  Outcome: Ongoing     Problem: Aspiration:  Goal: Absence of aspiration  Description: Absence of aspiration  Outcome: Ongoing     Problem: Bowel Function - Altered:  Goal: Bowel elimination is within specified parameters  Description: Bowel elimination is within specified parameters  Outcome: Ongoing     Problem: Cardiac Output - Decreased:  Goal: Hemodynamic stability will improve  Description: Hemodynamic stability will improve  Outcome: Ongoing     Problem: Fluid Volume - Imbalance:  Goal: Absence of imbalanced fluid volume signs and symptoms  Description: Absence of imbalanced fluid volume signs and symptoms  Outcome: Ongoing     Problem: Gas Exchange - Impaired:  Goal: Levels of oxygenation will improve  Description: Levels of oxygenation will improve  Outcome: Ongoing     Problem: Mental Status - Impaired:  Goal: Mental status will be restored to baseline  Description: Mental status will be restored to baseline  Outcome: Ongoing     Problem: Nutrition Deficit:  Goal: Ability to achieve adequate nutritional intake will improve  Description: Ability to achieve adequate nutritional intake will improve  Outcome: Ongoing     Problem: Pain:  Description: Pain management should include both nonpharmacologic and pharmacologic interventions.   Goal: Pain level will decrease  Description: Pain level will decrease  Outcome: Ongoing  Goal: Recognizes and communicates pain  Description: Recognizes and communicates pain  Outcome: Ongoing  Goal: Control of acute pain  Description: Control of acute pain  Outcome: Ongoing  Goal: Control of chronic pain  Description: Control of chronic pain  Outcome: Ongoing     Problem: Serum Glucose Level - Abnormal:  Goal: Ability to maintain appropriate glucose levels will improve to within specified parameters  Description: Ability to maintain appropriate glucose levels will improve to within specified parameters  Outcome: Ongoing     Problem: Skin Integrity - Impaired:  Goal: Will show no infection signs and symptoms  Description: Will show no infection signs and symptoms  Outcome: Ongoing  Goal: Absence of new skin breakdown  Description: Absence of new skin breakdown  Outcome: Ongoing     Problem: Sleep Pattern Disturbance:  Goal: Appears well-rested  Description: Appears well-rested  Outcome: Ongoing     Problem: Tissue Perfusion, Altered:  Goal: Circulatory function within specified parameters  Description: Circulatory function within specified parameters  Outcome: Ongoing     Problem: Tissue Perfusion - Cardiopulmonary, Altered:  Goal: Absence of angina  Description: Absence of angina  Outcome: Ongoing  Goal: Hemodynamic stability will improve  Description: Hemodynamic stability will improve  Outcome: Ongoing

## 2020-12-01 NOTE — FLOWSHEET NOTE
Current rotoprone settings: Reverse trendelenburg -8 degrees at all times, prone therapy settings: 10 minutes on right and left with 5 minutes center, rotation right and left  Increased to 45 degrees at this time. Tolerating at this time. RN spoke with wife MARGARITA, who is notified that she and one other family member (her daughter) have permission to come visit hospital briefly once. She is educated that they will willingly be entering a covid positive patient room/covid unit. They will be provided with gown, gloves, surgical masks only. They both verbalize understanding and want to proceed with visit.  Both family members tearful, but remain appreciative of the care pt is receiving.   Moisés Oneill RN 6:18 PM

## 2020-12-01 NOTE — FLOWSHEET NOTE
Telephone permission received from CASSIDY Rust RN nurse manager that patient's wife and daughter may visit one time briefly due to code status change and need for discussion of care goals moving forward. Nursing supervisor also notified.      Rodney Carr RN 6:23 PM

## 2020-12-01 NOTE — FLOWSHEET NOTE
Dr. Nikky Aparicio notified of current vent settings     Evaristo Skiff RN 9:23 AM     0930- vent settings changed per Dr. Nikky Aparicio, will re-draw ABGs in one hour, orders placed.      Evaristo Skiff RN 10:03 AM

## 2020-12-01 NOTE — PROGRESS NOTES
At shift change RN's attempted to supine pt. PT was made supine but SPO2 dropped to 60's, SBP dropped to 40's; pt was immediately returned to prone. This RN to continue to monitor.

## 2020-12-01 NOTE — CONSULTS
INPATIENT CARDIOLOGY CONSULT NOTE       Reason for consultation:  Wide complex rhythm   Referring physician:  Marcus Arce MD   Primary care physician: Jakob Allen MD  Dear Marcus Arce MD Thank you for the consult      History of present illness:Arturo is a 76 y. o.year old who  presents dyspnea. Patient is 77-year-old male with prior medical history significant for hypertension hyperlipidemia GERD who was admitted to the hospital with Covid pneumonia originally diagnosed on 2020. Patient is admitted on 2020. Patient is currently intubated sedated and in critical condition. Cardiology was consulted overnight for an abnormal EKG  EKG was shared and reviewed with me. EKG shows accelerated idioventricular rhythm. Discussed the case with nursing staff. Patient has been intermittently sinus rhythm with wide complex idioventricular rhythm at 70 to 80 bpm.    Patient is critically sick currently sedated intubated prone position onto IV pressors. Assessment and plan:     1. Accelerated idioventricular rhythm. Likely secondary to underlying deconditioning. Patient is critically sick on IV pressors. Continue to closely observe. Keep magnesium and potassium within limits. No further work-up planned at this point. 2. COVID-19 pneumonia/sepsis. IV antibiotics. Steroids. Medical management. 3. Acute renal failure: On CRRT. Nephrology follow-up. 4. Hyperlipidemia: On statin therapy. 5. Acute respiratory failure: Intubated on vent. Patient remains critically sick, poor prognosis. Past medical history:    has a past medical history of GERD (gastroesophageal reflux disease), Hyperlipidemia, and Hypertension. Past surgical history:   has a past surgical history that includes Appendectomy and IR NONTUNNELED VASCULAR CATHETER > 5 YEARS (2020). Social History:   reports that he has never smoked.  He has never used smokeless tobacco. He reports that he does not drink alcohol or use drugs.   Family history:   no family history of CAD, STROKE of DM    No Known Allergies    heparin (porcine) injection 2,000 Units, PRN  heparin (porcine) 5,000 Units in sodium chloride 0.9 % 20 mL infusion, Continuous  Artificial Tears 83-15 %, Q4H PRN  [START ON 12/2/2020] enoxaparin (LOVENOX) injection 30 mg, Daily  0.9 % sodium chloride bolus, PRN  potassium chloride 20 mEq/50 mL IVPB (Central Line), PRN  magnesium sulfate 1 g in dextrose 5% 100 mL IVPB, PRN  calcium gluconate 1 g in dextrose 5 % 100 mL IVPB, PRN    Or  calcium gluconate 2 g in dextrose 5 % 100 mL IVPB, PRN    Or  calcium gluconate 3 g in dextrose 5 % 100 mL IVPB, PRN    Or  calcium gluconate 4 g in dextrose 5 % 100 mL IVPB, PRN  sodium phosphate 6 mmol in dextrose 5 % 250 mL IVPB, PRN    Or  sodium phosphate 12 mmol in dextrose 5 % 250 mL IVPB, PRN    Or  sodium phosphate 18 mmol in dextrose 5 % 500 mL IVPB, PRN    Or  sodium phosphate 24 mmol in dextrose 5 % 500 mL IVPB, PRN  meropenem (MERREM) 1 g in sodium chloride 0.9 % 100 mL IVPB (mini-bag), Q8H  famotidine (PEPCID) injection 10 mg, BID  prismaSATE BK 0/3.5 5,000 mL solution, Continuous  prismaSATE BK 0/3.5 5,000 mL solution, Continuous  prismaSATE BK 0/3.5 5,000 mL solution, Continuous  vasopressin 20 Units in dextrose 5 % 100 mL infusion, Continuous  norepinephrine (LEVOPHED) 16 mg in dextrose 5% 250 mL infusion, Continuous  cisatracurium besylate (NIMBEX) 200 mg in sodium chloride 0.9 % 100 mL infusion, Continuous  methylPREDNISolone sodium (SOLU-MEDROL) injection 40 mg, Daily  sodium chloride flush 0.9 % injection 10 mL, 2 times per day  sodium chloride flush 0.9 % injection 10 mL, PRN  hydrOXYzine (VISTARIL) injection 50 mg, Q6H PRN  fentanyl (SUBLIMAZE) 1,000 mcg in sodium chloride 0.9% 100 mL infusion, Continuous  propofol injection, Continuous  albuterol sulfate  (90 Base) MCG/ACT inhaler 4 puff, Q4H    And  ipratropium (ATROVENT HFA) 17 MCG/ACT inhaler 4 puff, Q4H  chlorhexidine (PERIDEX) 0.12 % solution 15 mL, BID  0.9 % sodium chloride bolus, PRN  lidocaine PF 1 % injection 5 mL, See Admin Instructions  sodium chloride flush 0.9 % injection 10 mL, 2 times per day  sodium chloride flush 0.9 % injection 10 mL, PRN  sodium chloride flush 0.9 % injection 10 mL, 2 times per day  sodium chloride flush 0.9 % injection 10 mL, PRN  acetaminophen (TYLENOL) tablet 650 mg, Q6H PRN    Or  acetaminophen (TYLENOL) suppository 650 mg, Q6H PRN  polyethylene glycol (GLYCOLAX) packet 17 g, Daily PRN  promethazine (PHENERGAN) tablet 12.5 mg, Q6H PRN    Or  ondansetron (ZOFRAN) injection 4 mg, Q6H PRN  guaiFENesin-dextromethorphan (ROBITUSSIN DM) 100-10 MG/5ML syrup 5 mL, Q4H PRN  vitamin D CAPS 2,000 Units, Daily  atorvastatin (LIPITOR) tablet 40 mg, Daily      Current Facility-Administered Medications   Medication Dose Route Frequency Provider Last Rate Last Dose    heparin (porcine) injection 2,000 Units  2,000 Units Intracatheter PRN Julianne Diallo [place], DO   2,000 Units at 12/01/20 0523    heparin (porcine) 5,000 Units in sodium chloride 0.9 % 20 mL infusion   Intravenous Continuous Julianne August, DO 0.5 mL/hr at 12/01/20 1013      Artificial Tears 83-15 %   Both Eyes Q4H PRN Ellen Byrne DO        [START ON 12/2/2020] enoxaparin (LOVENOX) injection 30 mg  30 mg Subcutaneous Daily Kacy Gallardo MD        0.9 % sodium chloride bolus  2,000 mL Intravenous PRN Julianne Diallo [place], DO        potassium chloride 20 mEq/50 mL IVPB (Central Line)  20 mEq Intravenous PRN Julianne Diallo [place], DO        magnesium sulfate 1 g in dextrose 5% 100 mL IVPB  1 g Intravenous PRN Julianne Diallo [place], DO        calcium gluconate 1 g in dextrose 5 % 100 mL IVPB  1 g Intravenous PRN Julianne Diallo [place], DO   Stopped at 12/01/20 0627    Or    calcium gluconate 2 g in dextrose 5 % 100 mL IVPB  2 g Intravenous PRN Julianne August,         Or    calcium gluconate 3 g in dextrose 5 % 100 mL IVPB  3 g Intravenous PRN Adeleye Imani [place], DO        Or    calcium gluconate 4 g in dextrose 5 % 100 mL IVPB  4 g Intravenous PRN Adeleye Imani [place], DO        sodium phosphate 6 mmol in dextrose 5 % 250 mL IVPB  6 mmol Intravenous PRN Adeleye Imani [place], DO        Or    sodium phosphate 12 mmol in dextrose 5 % 250 mL IVPB  12 mmol Intravenous PRN Adeleye Imani [place], DO        Or    sodium phosphate 18 mmol in dextrose 5 % 500 mL IVPB  18 mmol Intravenous PRN Adeleye Imani [place], DO        Or    sodium phosphate 24 mmol in dextrose 5 % 500 mL IVPB  24 mmol Intravenous PRN Adeleye Imani [place], DO        meropenem (MERREM) 1 g in sodium chloride 0.9 % 100 mL IVPB (mini-bag)  1 g Intravenous Q8H [facility] [place]   Stopped at 12/01/20 1327    famotidine (PEPCID) injection 10 mg  10 mg Intravenous BID Esequiel Boswell MD   10 mg at 12/01/20 1137    prismaSATE BK 0/3.5 5,000 mL solution   Dialysis Continuous [facility] [place],  mL/hr at 12/01/20 0852      prismaSATE BK 0/3.5 5,000 mL solution   Dialysis Continuous [facility] [place], DO 1,000 mL/hr at 12/01/20 1003      prismaSATE BK 0/3.5 5,000 mL solution   Dialysis Continuous [facility] [place], DO 1,500 mL/hr at 12/01/20 1248      vasopressin 20 Units in dextrose 5 % 100 mL infusion  0.04 Units/min Intravenous Continuous Jodi Chowdhury PA-C 12 mL/hr at 12/01/20 1616 0.04 Units/min at 12/01/20 1616    norepinephrine (LEVOPHED) 16 mg in dextrose 5% 250 mL infusion  2 mcg/min Intravenous Continuous Shelbi Conway MD 7.5 mL/hr at 12/01/20 1337 8 mcg/min at 12/01/20 1337    cisatracurium besylate (NIMBEX) 200 mg in sodium chloride 0.9 % 100 mL infusion  2 mcg/kg/min (Adjusted) Intravenous Continuous Abdoul Shoemaker MD 3.8 mL/hr at 12/01/20 1531 1.5 mcg/kg/min at 12/01/20 1531    methylPREDNISolone sodium (SOLU-MEDROL) injection 40 mg  40 mg Intravenous Daily Esequiel Boswell MD   40 mg at PRN Suleiman Ang MD        polyethylene glycol (GLYCOLAX) packet 17 g  17 g Oral Daily PRN Suleiman Ang MD        promethazine (PHENERGAN) tablet 12.5 mg  12.5 mg Oral Q6H PRN Suleiman Ang MD        Or    ondansetron (ZOFRAN) injection 4 mg  4 mg Intravenous Q6H PRN Suleiman Ang MD        guaiFENesin-dextromethorphan (ROBITUSSIN DM) 100-10 MG/5ML syrup 5 mL  5 mL Oral Q4H PRN Suleiman Ang MD        vitamin D CAPS 2,000 Units  2,000 Units Oral Daily Suleiman Ang MD   Stopped at 11/30/20 0819    atorvastatin (LIPITOR) tablet 40 mg  40 mg Oral Daily Suleiman Ang MD   40 mg at 12/01/20 1301         Review of Systems     Could not be obtained         Physical Examination:      Vitals:    12/01/20 1618   BP:    Pulse: 56   Resp:    Temp:    SpO2: 99%      Wt Readings from Last 3 Encounters:   11/28/20 221 lb 12.5 oz (100.6 kg)   11/22/20 215 lb (97.5 kg)   11/18/20 215 lb (97.5 kg)     Body mass index is 30.93 kg/m². Due to the current efforts to prevent transmission of COVID-19 and also the need to preserve PPE for other caregivers, a face-to-face encounter with the patient was not performed. That being said, all relevant records and diagnostic tests were reviewed, including laboratory results and imaging. Please reference any relevant documentation elsewhere. Care will be coordinated with the primary service. Lab Review     Recent Labs     12/01/20  0600   WBC 26.2*   HGB 11.6*   HCT 35.9*   *      Recent Labs     12/01/20  1450   *   K 3.8   CL 94*   CO2 18*   PHOS 7.4*   BUN 57*   CREATININE 2.5*     Recent Labs     11/30/20  0603 12/01/20  1450   AST 92* 82*   ALT 50*  --    BILITOT 0.4 0.4   ALKPHOS 175* 164*     No results for input(s): TROPONINI in the last 72 hours.   No results found for: BNP  Lab Results   Component Value Date    INR 1.20 12/01/2020    PROTIME 14.5 12/01/2020         All labs, images, EKGs were personally reviewed

## 2020-12-01 NOTE — FLOWSHEET NOTE
Telephone update given to wife Paz Sharad, RN spoke with her at length regarding patient remains in very critical condition. She is very tearful, emotional support given. Aware Dr. Collin Simental plans to call her later today.      Oneal Apley RN 11:27 AM

## 2020-12-01 NOTE — FLOWSHEET NOTE
CRRT resumed at this time as ordered. Patient positioned back to prone positioning. Reverse trendelenburg -8 degrees at all times, prone therapy settings: 10 minutes on right and left with 5 minutes center, rotation right and left 35 degrees at this time.      Brandon Santiago RN 3:41 PM

## 2020-12-01 NOTE — PROGRESS NOTES
Nephrology  Dialysis Note        [address], [address] [place] [address]  Phone: [phone]  Office Hours: 8:30AM - 4:30PM  Monday - Friday          PROCEDURE:  Patient seen during CVVHDF      PHYSICIAN:  OLEG      INDICATION:  Acute tubular necrosis      RX:  See dialysis flowsheet for specifics on access, blood flow rate, dialysate baths, duration of dialysis, anticoagulation and other technical information.       COMMENTS:    -CONTINUE CRRT  -GENTLE FLUID REMOVAL  -CHANGES MADE TO HELP FILTER LAST LONGER, HEPARIN 125UNITS PER HR WILL BE INJECTED THROUGH THE FILTER  -ANURIC  -CRITICALLY ILL  -MONITOR FOR SIGNS OF BLEEDING    Electronically signed by Roxanna Alvarado DO on 12/1/2020 at 8:52 AM    [facility]  MD Kristi Werner DO [address],  [address]  [place] [address]  PHONE: [phone]  FAX: [phone]

## 2020-12-01 NOTE — FLOWSHEET NOTE
Per Dr. Skip Sullivan, continue to hold tube feed at this time until physician has spoken with patient's wife     Herbert Berg RN 1:27 PM

## 2020-12-01 NOTE — FLOWSHEET NOTE
Dr. Erika Aguilera rounding, aware of hypothermia despite warming measures. Current drips reviewed, aware of hemodynamics, labs. Verbal order received for palliative care consult.      Bk Frank RN 3:42 PM

## 2020-12-01 NOTE — FLOWSHEET NOTE
New robinson hugger warming blanket placed on patient while in prone position     Bk Frank RN 10:34 AM

## 2020-12-01 NOTE — FLOWSHEET NOTE
Rotoprone bed positioned to supine to allow for CRRT filter change. Joy catheter changed out to allow for secondary temperature check, as rectal temp has been running 32-33 degrees celcius. Joy core temp reading 32.6 C. With supine positioning, O2 sat stable at 94%. Bradycardia noted, with episodes of 35-49, not sustained. FIO2 increased to 100% per Alphonza Daft RT and PEEP increased to 15 per Alphonza Daft RT for supine positioning.      Isaac Alvarez RN 3:05 PM

## 2020-12-01 NOTE — FLOWSHEET NOTE
Dr. Gisele Hernandez rounding, updated on condition overnight per Andreas Mckeon RN. Aware of frequent cardiac rhythm change to IVCD, frequent conduction changes noted.  Dr. Gisele Hernandez to call and speak with family today regarding code status     Fabio Long RN 9:26 AM

## 2020-12-01 NOTE — PROGRESS NOTES
Pulmonary and Critical Care  Progress Note      VITALS:  /74   Pulse 56   Temp (!) 91.4 °F (33 °C) (Rectal)   Resp 24   Ht 5' 11\" (1.803 m)   Wt 221 lb 12.5 oz (100.6 kg)   SpO2 97%   BMI 30.93 kg/m²     Subjective:   CHIEF COMPLAINT :SOB     HPI:                The patient is sedated and on the vent. He is hypothermic. His CXR showed no improvement. He is on CRRT. He is not responding to painful stimuli    Objective:   PHYSICAL EXAM:    LUNGS:Bilateral basal crackles  Abd-soft, BS+,NT  Ext -No pedal edema  CVS-s1s2, no murmurs      DATA:    CBC:  Recent Labs     11/29/20  0550 11/30/20  0603 12/01/20  0600   WBC 33.8* 36.1* 26.2*   RBC 3.98* 3.76* 3.68*   HGB 12.5* 12.0* 11.6*   HCT 39.8* 37.3* 35.9*    139* 111*   .0 99.2 97.6   MCH 31.4* 31.9* 31.5*   MCHC 31.4* 32.2 32.3   RDW 14.2 14.6 14.6   NRBC  --  1  --    SEGSPCT 94.0* 91.0* 79.0*   BANDSPCT 2* 5 12*      BMP:  Recent Labs     11/30/20  1900 12/01/20  0055 12/01/20  0600   * 131* 133*   K 5.9* 4.9 4.5   CL 94* 93* 92*   CO2 18* 17* 19*   * 89* 77*   CREATININE 4.1* 3.3* 3.2*   CALCIUM 7.4* 7.4* 7.8*   GLUCOSE 169* 135* 153*      ABG:  Recent Labs     11/29/20  0600 11/30/20  1145 12/01/20  0800   PH 7.23* 7.06* 7.16*   PO2ART 56* 142* 109*   QZD2LAM 42.0 63.0* 57.0*   O2SAT 66.9* 96.7 96.0     BNP  No results found for: BNP   D-Dimer:  Lab Results   Component Value Date    DDIMER >5250 (H) 12/01/2020      Radiology:   Diffuse parenchymal infiltrates are seen throughout the lungs bilaterally,    left greater than right, and overall unchanged from the previous examination    felt consistent with multifocal pneumonia     1.        Assessment/Plan     Patient Active Problem List    Diagnosis Date Noted    Acute kidney [facility] (MICEHLLE) with acute tubular necrosis (ATN) [facility] [facility]     Acidosis     MICHELLE [facility]acute kidney injury) ([facility] [facility]     Electrolyte imbalance     Isolated proteinuria with minor glomerular abnormality     Volume depletion     Acute respiratory failure with hypoxia ([facility] [address] 11/22/2020    KISHORE on CPAP 06/19/2017   Acute hypoxic resp failure sec to non Cardiogenic Pulmonary edema  VDRF  Leukocytosis - improving  Bilateral Pneumonia  Non Calcified right sided Pulmonary nodules  COVID Pneumonia  MICHELLE  AG Metabolic acidosis  Moderate malnutrition       1. Abx  2. F/u C&S  3. CRRT per renal  4. Wean off Levophed for a MAP >65 mmhg  5. Inhalers  6. Solumedrol  7. SAT and SBT trial when Fio2 is down to 50%  8. Keep sats > 92%  9. Tube feeds  10. DVT and GI Prophylaxis  11. C/w present management  No follow-ups on file.     Electronically signed by Isela Ramirez MD on 12/1/2020 at 11:59 AM

## 2020-12-01 NOTE — CARE COORDINATION
Patient remains critical; Vent and rotaprone. CM will follow for post extubation needs.  Stephen Rojas RN

## 2020-12-01 NOTE — FLOWSHEET NOTE
Patient repositioned back to supine to allow for RN to place warming blanket on both sides of patient, rectal temp down to 32.7 C. Now warming blanket and robinson hugger in use. Patient O2 sat did drop 60's to 80's during this brief episode of supine.      Chioma Monroy RN 12:12 PM

## 2020-12-01 NOTE — PROGRESS NOTES
Infectious Disease Progress Note  2020   Patient Name: Jone Germain : 1945       Reason for visit: F/u COVID-19 pneumonia, acute respiratory failure? History:? Interval history noted, in rotoprone bed  Intubated, sedated and on mechanical ventilation  Physical Exam:  Vital Signs: BP (!) 90/50   Pulse 56   Temp (S) (!) 91.6 °F (33.1 °C) (Core) Comment (Src): per temp sensing barron   Resp 24   Ht 5' 11\" (1.803 m)   Wt 221 lb 12.5 oz (100.6 kg)   SpO2 99%   BMI 30.93 kg/m²     Gen: intubated and sedated, in Rotoprone bed. Skin: no stigmata of endocarditis  Wounds: C/D/I  HEMT: AT/NC ETT  Eyes: PERRL   Neck: Supple. Trachea midline. No LAD. Chest: Deferred as use of PAPR does not allow for auscultation. Heart: Deferred as use of PAPR does not allow for auscultation. Abd: soft, non-distended, no tenderness, no hepatomegaly. Normoactive bowel sounds. Ext: no clubbing, cyanosis, or edema  Catheter Site: without erythema or tenderness  LDA: CVC: RA PICC line, Urethral catheter :  Neuro: sedated and on the ventilator. Radiologic / Imaging / TESTING  CXR 2020  Impression:   Left greater than right bilateral airspace opacities are noted without   significant change.         Labs:    Recent Results (from the past 24 hour(s))   Calcium, ionized    Collection Time: 20  7:00 PM   Result Value Ref Range    Ionized Ca 0.92 (L) 1.12 - 1.32 mMOL/L    Calcium, Ion 3.68 (L) 4.48 - 5.28 MG/DL   Critical Care Panel    Collection Time: 20  7:00 PM   Result Value Ref Range    Sodium 131 (L) 135 - 145 MMOL/L    Potassium 5.9 (HH) 3.5 - 5.1 MMOL/L    Chloride 94 (L) 99 - 110 mMol/L    CO2 18 (L) 21 - 32 MMOL/L    Anion Gap 19 (H) 4 - 16     (H) 6 - 23 MG/DL    CREATININE 4.1 (H) 0.9 - 1.3 MG/DL    Glucose 169 (H) 70 - 99 MG/DL    Calcium 7.4 (L) 8.3 - 10.6 MG/DL    GFR Non- 14 (L) >60 mL/min/1.73m2    GFR  17 (L) >60 mL/min/1.73m2    Phosphorus 10.4 (H) 2.5 - 4.9 MG/DL    Magnesium 3.2 (H) 1.8 - 2.4 mg/dl   Calcium, ionized    Collection Time: 12/01/20 12:55 AM   Result Value Ref Range    Ionized Ca 0.93 (L) 1.12 - 1.32 mMOL/L    Calcium, Ion 3.72 (L) 4.48 - 5.28 MG/DL   Critical Care Panel    Collection Time: 12/01/20 12:55 AM   Result Value Ref Range    Sodium 131 (L) 135 - 145 MMOL/L    Potassium 4.9 3.5 - 5.1 MMOL/L    Chloride 93 (L) 99 - 110 mMol/L    CO2 17 (L) 21 - 32 MMOL/L    Anion Gap 21 (H) 4 - 16    BUN 89 (H) 6 - 23 MG/DL    CREATININE 3.3 (H) 0.9 - 1.3 MG/DL    Glucose 135 (H) 70 - 99 MG/DL    Calcium 7.4 (L) 8.3 - 10.6 MG/DL    GFR Non- 18 (L) >60 mL/min/1.73m2    GFR  22 (L) >60 mL/min/1.73m2    Phosphorus 8.4 (H) 2.5 - 4.9 MG/DL    Magnesium 2.9 (H) 1.8 - 2.4 mg/dl   EKG 12 Lead    Collection Time: 12/01/20  4:46 AM   Result Value Ref Range    Ventricular Rate 68 BPM    Atrial Rate 68 BPM    P-R Interval 148 ms    QRS Duration 70 ms    Q-T Interval 398 ms    QTc Calculation (Bazett) 423 ms    P Axis 65 degrees    R Axis 84 degrees    T Axis 66 degrees    Diagnosis       Normal sinus rhythm  Low voltage QRS  Septal infarct , age undetermined  Abnormal ECG  When compared with ECG of 22-NOV-2020 15:41,  Vent.  rate has decreased BY  41 BPM  Septal infarct is now present  ST no longer depressed in Anterior leads  Nonspecific T wave abnormality now evident in Anterior leads     EKG 12 Lead    Collection Time: 12/01/20  4:56 AM   Result Value Ref Range    Ventricular Rate 72 BPM    Atrial Rate 153 BPM    QRS Duration 132 ms    Q-T Interval 464 ms    QTc Calculation (Bazett) 508 ms    R Axis -64 degrees    T Axis 111 degrees    Diagnosis       Wide QRS rhythm  Left axis deviation  Left ventricular hypertrophy with QRS widening and repolarization abnormality  Inferior infarct , age undetermined  Abnormal ECG  When compared with ECG of 01-DEC-2020 04:46,  Wide QRS rhythm has replaced Sinus rhythm     CBC Auto Differential Collection Time: 12/01/20  6:00 AM   Result Value Ref Range    WBC 26.2 (H) 4.0 - 10.5 K/CU MM    RBC 3.68 (L) 4.6 - 6.2 M/CU MM    Hemoglobin 11.6 (L) 13.5 - 18.0 GM/DL    Hematocrit 35.9 (L) 42 - 52 %    MCV 97.6 78 - 100 FL    MCH 31.5 (H) 27 - 31 PG    MCHC 32.3 32.0 - 36.0 %    RDW 14.6 11.7 - 14.9 %    Platelets 676 (L) 033 - 440 K/CU MM    MPV 11.1 7.5 - 11.1 FL    Differential Type MANUAL DIFFERENTIAL     Segs Relative 79.0 (H) 36 - 66 %    Lymphocytes % 1.0 (L) 24 - 44 %    Monocytes % 5.0 (H) 0 - 4 %    Eosinophils % 1.0 0 - 3 %    Segs Absolute 20.6 K/CU MM    Lymphocytes Absolute 0.3 K/CU MM    Monocytes Absolute 1.3 K/CU MM    Eosinophils Absolute 0.3 K/CU MM    Nucleated RBC % 0.3 %    Total Nucleated RBC 0.1 K/CU MM    Myelocyte Percent 1 (H) 0.0 %    Metamyelocytes Relative 1 (H) 0.0 %    Bands Relative 12 (H) 5 - 11 %    Myelocytes Absolute 0.26 K/CU MM    Metamyelocytes Absolute 0.26 K/CU MM    Bands Absolute 3.14 K/CU MM    RBC Morphology OCCASIONAL     Anisocytosis 1+     Hanson Cells 1+     Toxic Granulation PRESENT     Dohle Bodies PRESENT    Protime-INR    Collection Time: 12/01/20  6:00 AM   Result Value Ref Range    Protime 14.5 11.7 - 14.5 SECONDS    INR 1.20 INDEX   APTT    Collection Time: 12/01/20  6:00 AM   Result Value Ref Range    aPTT 81.2 (H) 25.1 - 37.1 SECONDS   Fibrinogen    Collection Time: 12/01/20  6:00 AM   Result Value Ref Range    Fibrinogen 174 (L) 196.9 - 442.1 MG/DL   D-Dimer, Quantitative    Collection Time: 12/01/20  6:00 AM   Result Value Ref Range    D-Dimer, Quant >5250 (H) <230 NG/mL(DDU)   Triglyceride    Collection Time: 12/01/20  6:00 AM   Result Value Ref Range    Triglycerides 384 (H) <150 MG/DL   Calcium, ionized    Collection Time: 12/01/20  6:00 AM   Result Value Ref Range    Ionized Ca 0.97 (L) 1.12 - 1.32 mMOL/L    Calcium, Ion 3.88 (L) 4.48 - 5.28 MG/DL   Critical Care Panel    Collection Time: 12/01/20  6:00 AM   Result Value Ref Range    Sodium 133 (L) 135 - 145 MMOL/L    Potassium 4.5 3.5 - 5.1 MMOL/L    Chloride 92 (L) 99 - 110 mMol/L    CO2 19 (L) 21 - 32 MMOL/L    Anion Gap 22 (H) 4 - 16    BUN 77 (H) 6 - 23 MG/DL    CREATININE 3.2 (H) 0.9 - 1.3 MG/DL    Glucose 153 (H) 70 - 99 MG/DL    Calcium 7.8 (L) 8.3 - 10.6 MG/DL    GFR Non- 19 (L) >60 mL/min/1.73m2    GFR  23 (L) >60 mL/min/1.73m2    Phosphorus 8.6 (H) 2.5 - 4.9 MG/DL    Magnesium 2.6 (H) 1.8 - 2.4 mg/dl   Blood gas, arterial    Collection Time: 12/01/20  8:00 AM   Result Value Ref Range    pH, Bld 7.16 (L) 7.34 - 7.45    pCO2, Arterial 57.0 (H) 32 - 45 MMHG    pO2, Arterial 109 (H) 75 - 100 MMHG    Base Excess 9 (H) 0 - 3.3    HCO3, Arterial 20.3 18 - 23 MMOL/L    CO2 Content 22.0 19 - 24 MMOL/L    O2 Sat 96.0 96 - 97 %    Carbon Monoxide, Blood 1.7 0 - 5 %    Methemoglobin, Arterial 1.4 <1.5 %    Comment ACPC 20 PI20 70% +15    Blood gas, arterial    Collection Time: 12/01/20 11:00 AM   Result Value Ref Range    pH, Bld 7.19 (L) 7.34 - 7.45    pCO2, Arterial 54.0 (H) 32 - 45 MMHG    pO2, Arterial 60 (L) 75 - 100 MMHG    Base Excess 8 (H) 0 - 3.3    HCO3, Arterial 20.6 18 - 23 MMOL/L    CO2 Content 22.3 19 - 24 MMOL/L    O2 Sat 89.6 (L) 96 - 97 %    Carbon Monoxide, Blood 2.2 0 - 5 %    Methemoglobin, Arterial 1.1 <1.5 %    Comment PC PI20 24 60% +13    Calcium, ionized    Collection Time: 12/01/20  2:50 PM   Result Value Ref Range    Ionized Ca 1.01 (L) 1.12 - 1.32 mMOL/L    Calcium, Ion 4.04 (L) 4.48 - 5.28 MG/DL   Critical Care Panel    Collection Time: 12/01/20  2:50 PM   Result Value Ref Range    Sodium 133 (L) 135 - 145 MMOL/L    Potassium 3.8 3.5 - 5.1 MMOL/L    Chloride 94 (L) 99 - 110 mMol/L    CO2 18 (L) 21 - 32 MMOL/L    Anion Gap 21 (H) 4 - 16    BUN 57 (H) 6 - 23 MG/DL    CREATININE 2.5 (H) 0.9 - 1.3 MG/DL    Glucose 143 (H) 70 - 99 MG/DL    Calcium 7.9 (L) 8.3 - 10.6 MG/DL    GFR Non- 25 (L) >60 mL/min/1.73m2    GFR African American 31 (L) >60 mL/min/1.73m2    Phosphorus 7.4 (H) 2.5 - 4.9 MG/DL    Magnesium 2.4 1.8 - 2.4 mg/dl   basic to comprehensive upgrade    Collection Time: 12/01/20  2:50 PM   Result Value Ref Range    Alb 2.4 (L) 3.4 - 5.0 GM/DL    Total Protein 5.4 (L) 6.4 - 8.2 GM/DL    Total Bilirubin 0.4 0.0 - 1.0 MG/DL    ALT 42 (H) 10 - 40 U/L    AST 82 (H) 15 - 37 IU/L    Alkaline Phosphatase 164 (H) 40 - 129 IU/L   C-Reactive Protein    Collection Time: 12/01/20  2:50 PM   Result Value Ref Range    CRP, High Sensitivity 153.3 mg/L     CULTURE results: Invalid input(s): BLOOD CULTURE,  URINE CULTURE, SURGICAL CULTURE    Diagnosis:  Patient Active Problem List   Diagnosis    KISHORE on CPAP    Acute [facility] failure with hypoxia [facility] [address]    MICHELLE (acute kidney injury) ([facility] [address]    Electrolyte imbalance    Isolated proteinuria with minor glomerular abnormality    Volume depletion    Acute kidney injury (MICHELLE) with acute tubular necrosis (ATN) (HCC)    Acidosis    AIVR (accelerated idioventricular rhythm) (HCC)       Active Problems  Active Problems:    Acute respiratory failure with hypoxia (HCC)    MICHELLE (acute kidney injury) (HCC)    Electrolyte imbalance    Isolated proteinuria with minor glomerular abnormality    Volume depletion    Acute kidney injury (MICHELLE) with acute tubular necrosis (ATN) (HCC)    Acidosis    AIVR (accelerated idioventricular rhythm) (HCC)  Resolved Problems:    * No resolved hospital problems. *      Impression and plan   Summary and rationale: Patient is a 76 y.o.   male critical COVID-19 pneumonia with acute kidney injury, transaminitis, Covid associated coagulopathy. On CRRT    Clinical status: continues to worsen, hypothermic, sadly he is actively dying .    Therapeutic:  o Ongoing antibiotics: Meropenem 11/28-  o Antiviral agent: remdesivir 11/24-28  o Anti-inflammatory agents:  - Dexamethasone: 11/22-24  - Solu-Medrol: 11/25-12/1  - Start hydrocortisone on 12/1  o Completed antibiotics: o Convalescent plasma receipt:  o Other agents:    Diagnostic:   F/u: Interleukin-6, beta-1 3D glucan ,blood culture 11/28, 0/2   Other: Palliative care consult.        Electronically signed by: Electronically signed by Shana Wilson MD on 12/1/2020 at 5:19 PM

## 2020-12-01 NOTE — PROGRESS NOTES
notified of spike in CRRT TMP and pressure drop, RN suggested heparin for CRRT filter. This RN received instructions to put fluid removal at 0.

## 2020-12-01 NOTE — PROGRESS NOTES
Patient began having frequent long rungs of ventricular rhythm. Pulse present, rate 60-80. Posterior EKG obtained as he is too unstable to be supinated. Dr. Tete Johnson reviewed, stated accelerated idioventricular rhythm, to continue to observe and check electrolytes. Labs have just been drawn. Dr. Janeth Benitez notified.     Tha James PA-C  Hospitalist  12/1/2020, 5:16 AM

## 2020-12-01 NOTE — PROGRESS NOTES
At 2220 CRRT reading TMP too high, RN changed filter and had difficulty with new cartridge- did not get CRRT restarted till 0030.

## 2020-12-01 NOTE — FLOWSHEET NOTE
Prone therapy resumed at this time per Rotoprone bed. Reverse trendelenburg -8 degrees at all times, prone therapy settings: 10 minutes on right and left with 5 minutes center, rotation right and left 35 degrees at this time.      Gary Davis RN 10:00 AM

## 2020-12-01 NOTE — FLOWSHEET NOTE
Dr. Floyd Aguirre notified of repeat ABGs with some slight improvement     Chioma Monroy RN 12:14 PM

## 2020-12-02 NOTE — PROGRESS NOTES
Pulmonary and Critical Care  Progress Note      VITALS:  BP (!) 76/34   Pulse 61   Temp 97.7 °F (36.5 °C) (Rectal)   Resp 24   Ht 5' 11\" (1.803 m)   Wt 221 lb 12.5 oz (100.6 kg)   SpO2 94%   BMI 30.93 kg/m²     Subjective:   CHIEF COMPLAINT :SOB     HPI:                The patient is sedated and on the vent . He is getting CRRT. He is on paralytics with no response to painful stimuli. Objective:   PHYSICAL EXAM:    LUNGS:Bilateral basal crackles  Abd-soft, BS+,NT  Ext -No pedal edema  CVS-s1s2, no murmurs      DATA:    CBC:  Recent Labs     11/30/20  0603 12/01/20  0600 12/02/20  0645   WBC 36.1* 26.2* 15.7*   RBC 3.76* 3.68* 3.25*   HGB 12.0* 11.6* 10.0*   HCT 37.3* 35.9* 30.9*   * 111* 169   MCV 99.2 97.6 95.1   MCH 31.9* 31.5* 30.8   MCHC 32.2 32.3 32.4   RDW 14.6 14.6 14.4   NRBC 1  --   --    SEGSPCT 91.0* 79.0* 84.0*   BANDSPCT 5 12* 5      BMP:  Recent Labs     12/01/20 2000 12/02/20  0030 12/02/20  0600   * 131* 133*   K 3.7 4.2 4.0   CL 93* 94* 96*   CO2 23 19* 20*   BUN 51* 44* 46*   CREATININE 2.3* 2.0* 1.9*   CALCIUM 7.9* 7.6* 7.4*   GLUCOSE 128* 136* 148*      ABG:  Recent Labs     12/01/20 2000 12/01/20  2200 12/02/20  0800   PH 7.13* 7.26* 7.33*   PO2ART 85 124* 71*   HGG0TPA 69.0* 47.0* 39.0   O2SAT 94.9* 96.7 93.8*     BNP  No results found for: BNP   D-Dimer:  Lab Results   Component Value Date    DDIMER 4319 (H) 12/02/2020      1.  Radiology: Diffuse bilateral airspace opacities without significant change      Assessment/Plan     Patient Active Problem List    Diagnosis Date Noted    AIVR (accelerated idioventricular rhythm) (HCC)     Acute kidney injury (MICHELLE) with acute tubular necrosis (ATN) (HCC)     Acidosis     MICHELLE [facility]) ([facility] [address]     Electrolyte imbalance     Isolated proteinuria with minor glomerular abnormality     Volume depletion     Acute respiratory failure with hypoxia ([facility] [address] 11/22/2020    KISHORE on CPAP 06/19/2017     Acute hypoxic resp failure sec to non Cardiogenic Pulmonary edema  VDRF  Leukocytosis - improving  Bilateral Pneumonia  Non Calcified right sided Pulmonary nodules  COVID Pneumonia  MICHELLE  AG Metabolic acidosis  Moderate malnutrition     1. CRRT per renal  2. Abx  3. F/u C&S  4. Inhalers  5. D/c Solumedrol as patient is on Hydrocortisone  6. Wean off Pressors for a MAP > 65 mmhg  7. Keep sats > 88% with permissive hypercapnea  8. Prognosis guarded  9. CXR in am  10. C/w present management  11. Tube feeds once off rotapro  No follow-ups on file.     Electronically signed by Antwan Mills MD on 12/2/2020 at 11:38 AM

## 2020-12-02 NOTE — PROGRESS NOTES
Hospitalist Progress Note      Name:  Kiel Haider /Age/Sex: 1945  (76 y.o. male)   MRN & CSN:  [id number] & [id number] Admission Date/Time: 2020  3:03 AM   Location:  -A PCP: Kimberley Perdue MD         Hospital Day: 10    Subjective:   Pt S&E    Continues to do poorly  Arrhythmias overnight    No ROS as he is intubated and sedated. Objective: Intake/Output Summary (Last 24 hours) at 2020 0437  Last data filed at 2020 0412  Gross per 24 hour   Intake 2131.47 ml   Output 3238 ml   Net -1106.53 ml      Vitals:   Vitals:    20 0345   BP:    Pulse: 52   Resp:    Temp:    SpO2: 97%     Physical Exam:   GEN sedated male, laying in bed in no apparent distress. EYES Pupils are equally round. No scleral erythema, discharge, or conjunctivitis. HENT Mucous membranes are moist. +ETT +NGT  NECK No apparent thyromegaly or masses. RESP +rales or rhonchi. Symmetric chest movement while on room air. CARDIO/VASC S1/S2 auscultated. Regular rate without appreciable murmurs, rubs, or gallops. Peripheral pulses equal bilaterally and palpable. +1 pitting edema in extremities   GI Abdomen is soft without significant tenderness, masses, or guarding. Bowel sounds are normoactive. Rectal exam deferred.  Joy catheter is present. MSK No gross joint deformities. No spontaneous movement noted  SKIN Normal coloration, warm, dry. NEURO Does not follow directions.  No response to verbal or painful stimuli  PSYCH Sedated on propofol, nimbex    Medications:   Medications:    enoxaparin  30 mg Subcutaneous Daily    hydrocortisone sodium succinate PF  100 mg Intravenous Q8H    meropenem  1 g Intravenous Q8H    famotidine (PEPCID) injection  10 mg Intravenous BID    sodium chloride flush  10 mL Intravenous 2 times per day    albuterol sulfate HFA  4 puff Inhalation Q4H    And    ipratropium  4 puff Inhalation Q4H    chlorhexidine  15 mL Mouth/Throat BID    lidocaine PF  5 mL Intradermal See Admin Instructions    sodium chloride flush  10 mL Intravenous 2 times per day    sodium chloride flush  10 mL Intravenous 2 times per day    vitamin D  2,000 Units Oral Daily    atorvastatin  40 mg Oral Daily      Infusions:    heparin 5000 units CRRT syringe 0.5 mL/hr at 12/01/20 1013    prismaSATE BGK 4/2.5 250 mL/hr (12/01/20 1951)    prismaSATE BGK 4/2.5 1,500 mL/hr (12/02/20 0250)    prismaSATE BGK 4/2.5 1,000 mL/hr (12/02/20 0100)    vasopressin (Septic Shock) infusion 0.04 Units/min (12/02/20 0121)    norepinephrine 2 mcg/min (12/02/20 0330)    cisatracurium (NIMBEX) infusion 2 mcg/kg/min (12/02/20 0209)    fentanyl 200 mcg/hr (12/02/20 0345)    propofol 80 mcg/kg/min (12/02/20 0325)     PRN Meds: heparin (porcine), 2,000 Units, PRN  Artificial Tears, , Q4H PRN  sodium chloride, 2,000 mL, PRN  potassium chloride, 20 mEq, PRN  magnesium sulfate, 1 g, PRN  calcium gluconate, 1 g, PRN    Or  calcium gluconate, 2 g, PRN    Or  calcium gluconate, 3 g, PRN    Or  calcium gluconate, 4 g, PRN  sodium phosphate IVPB, 6 mmol, PRN    Or  sodium phosphate IVPB, 12 mmol, PRN    Or  sodium phosphate IVPB, 18 mmol, PRN    Or  sodium phosphate IVPB, 24 mmol, PRN  sodium chloride flush, 10 mL, PRN  hydrOXYzine, 50 mg, Q6H PRN  sodium chloride, 30 mL, PRN  sodium chloride flush, 10 mL, PRN  sodium chloride flush, 10 mL, PRN  acetaminophen, 650 mg, Q6H PRN    Or  acetaminophen, 650 mg, Q6H PRN  polyethylene glycol, 17 g, Daily PRN  promethazine, 12.5 mg, Q6H PRN    Or  ondansetron, 4 mg, Q6H PRN  guaiFENesin-dextromethorphan, 5 mL, Q4H PRN            Pertinent New Labs & Imaging Studies     CBC with Differential:    Lab Results   Component Value Date    WBC 26.2 12/01/2020    RBC 3.68 12/01/2020    HGB 11.6 12/01/2020    HCT 35.9 12/01/2020     12/01/2020    MCV 97.6 12/01/2020    MCH 31.5 12/01/2020    MCHC 32.3 12/01/2020    RDW 14.6 12/01/2020    NRBC 1 11/30/2020    SEGSPCT 79.0 12/01/2020    BANDSPCT 12 12/01/2020    LYMPHOPCT 1.0 12/01/2020    PROMYELOPCT 9 11/27/2020    MONOPCT 5.0 12/01/2020    MYELOPCT 1 12/01/2020    BASOPCT 0.2 11/24/2020    MONOSABS 1.3 12/01/2020    LYMPHSABS 0.3 12/01/2020    EOSABS 0.3 12/01/2020    BASOSABS 0.0 11/24/2020    DIFFTYPE MANUAL DIFFERENTIAL 12/01/2020     CMP:    Lab Results   Component Value Date     12/02/2020    K 4.2 12/02/2020    CL 94 12/02/2020    CO2 19 12/02/2020    BUN 44 12/02/2020    CREATININE 2.0 12/02/2020    GFRAA 40 12/02/2020    LABGLOM 33 12/02/2020    GLUCOSE 136 12/02/2020    PROT 5.4 12/01/2020    LABALBU 2.4 12/01/2020    CALCIUM 7.6 12/02/2020    BILITOT 0.4 12/01/2020    ALKPHOS 164 12/01/2020    AST 82 12/01/2020    ALT 42 12/01/2020     No results found. Assessment and Plan:   Margareth Pearce is a 76 y.o.  male  who presents with SOB    Acute respiratory failure with hypoxia  Sepsis  COVID-19 pneumonia  Likely superimposed bacterial pneumonia  Concern for VAP  S/p intubation on 11/25/2020  Repeat Chest X ray with persistent infiltrates  Dexamethasone>>changed to Soulmedrol--Continue  Continue Remdesivir  S/p Convalescent plasma  Continue monitoring inflammatory markers  Improved Procalcitonin but still elevated  Continue Meropenem and added Vancomycin, repeated cultures pending  Urine Legionella and Strep ag negative  MRSA negative D/C Vancomycin  Pulmonary consulted, appreciated recommendations  Respiratory acidosis. Needed vent change, will defer to Pulmonary  WBC remains to be high, continues to trending up  ID recommendations appreciated, noted orders     MICHELLE likely ATN  Metabolic acidosis and Respiratory acidosis  Likely pre renal  Creatinine worsening  Started on Fluids  Nephrology recommendations appreciated-To start  Added Albumin    Hyponatreia and Hypokalemia-Resolved     Hypertension  Currently blood pressure, holding medications      --> discussed goals of care with wife, agreeable to DNRCCA/DNI. Requesting to see him with their daughter.  She is aware of the grim prognosis  --> emotional supported    Diet No diet orders on file   DVT Prophylaxis [x] Lovenox, []  Heparin, [] SCDs, [] Ambulation   GI Prophylaxis [x] PPI,  [] H2 Blocker,  [] Carafate,  [] Diet/Tube Feeds   Code Status Limited   Disposition Outcome grim   MDM [] Low, [] Moderate,[x]  High    [] One or more chronic illnesses with severe exacerbation or progression    [x] Acute or chronic illnesses or injuries that pose a threat to life or bodily function - requiring invasive ventilation     [] An abrupt change in neurological status    [] Decision not to resuscitate     [x] Drug therapy requiring intensive monitoring for toxicity - nimbex, propofol      Electronically signed by Myra Arriaza DO on 12/2/2020 at 4:37 AM

## 2020-12-02 NOTE — PROGRESS NOTES
CARDIOLOGY PROGRESS NOTE                                                  Name:  Keron Mills /Age/Sex: 1945  (76 y.o. male)   MRN & CSN:  [id number] & [id number] Admission Date/Time: 2020  3:03 AM   Location:  -A PCP: Carmita Nation MD         Admit Date:  2020  Hospital Day: 10      SUBJECTIVE:   Seen patient as followup as consultation for abnormal EKG    Intubated, pronator, critically ill     TELEMETRY: Sinus / AIVR       Intake/Output Summary (Last 24 hours) at 2020 1620  Last data filed at 2020 1600  Gross per 24 hour   Intake 1852.16 ml   Output 3877 ml   Net -2024.84 ml       Assessment/Plan:         1. Accelerated idioventricular rhythm / with alternating NSR . Secondary to underlying deconditioning. Patient is critically sick on IV pressors. Continue to closely observe. Keep magnesium and potassium within limits. No further work-up planned at this point. 2. COVID-19 pneumonia/sepsis. IV antibiotics. Steroids. Medical management. 3. Acute renal failure: On CRRT. Nephrology follow-up. 4. Hyperlipidemia: On statin therapy. 5. Acute respiratory failure: Intubated on vent.     Patient remains critically sick, poor prognosis.     Please call us with further questions       Past medical history:    has a past medical history of GERD (gastroesophageal reflux disease), Hyperlipidemia, and Hypertension. Past surgical history:   has a past surgical history that includes Appendectomy and IR NONTUNNELED VASCULAR CATHETER > 5 YEARS (2020). Social History:   reports that he has never smoked. He has never used smokeless tobacco. He reports that he does not drink alcohol or use drugs. Family history:  family history is not on file.     OBJECTIVE:     /73   Pulse 61   Temp 98.4 °F (36.9 °C) (Rectal)   Resp 24   Ht 5' 11\" (1.803 m)   Wt 221 lb 12.5 oz (100.6 kg)   SpO2 97%   BMI 30.93 kg/m²       Intake/Output Summary (Last 24 hours) at 12/2/2020 1620  Last data filed at 12/2/2020 1600  Gross per 24 hour   Intake 1852.16 ml   Output 3877 ml   Net -2024.84 ml       Physical Exam:    General Appearance: Sedated and intubated   Constitutional:  Well developed, Well nourished  HEENT:  Normocephalic, Atraumatic, Oropharynx moist, No oral exudates,   Nose normal. Neck Supple Carotid: no carotid bruit  Eyes:  Conjunctiva normal, No discharge. Respiratory:    Coarse breath sounds. Mechanical ventilator support  Cardiovascular: S1-S2 No murmurs auscultated   GI:  Soft   non distended. Musculoskeletal:    No cyanosis, No clubbing. Integument:  Warm, Dry, No erythema, No rash. Lymphatic:  No lymphadenopathy noted.    Neurologic:  Sedated   Psychiatric:  Could not be assessed       MEDICATIONS:     enoxaparin  30 mg Subcutaneous Daily    hydrocortisone sodium succinate PF  100 mg Intravenous Q8H    meropenem  1 g Intravenous Q8H    famotidine (PEPCID) injection  10 mg Intravenous BID    sodium chloride flush  10 mL Intravenous 2 times per day    albuterol sulfate HFA  4 puff Inhalation Q4H    And    ipratropium  4 puff Inhalation Q4H    chlorhexidine  15 mL Mouth/Throat BID    lidocaine PF  5 mL Intradermal See Admin Instructions    sodium chloride flush  10 mL Intravenous 2 times per day    sodium chloride flush  10 mL Intravenous 2 times per day    vitamin D  2,000 Units Oral Daily    atorvastatin  40 mg Oral Daily      heparin 5000 units CRRT syringe 0.5 mL/hr at 12/01/20 1013    prismaSATE BGK 4/2.5 250 mL/hr (12/01/20 1951)    prismaSATE BGK 4/2.5 2,000 mL/hr (12/02/20 1619)    prismaSATE BGK 4/2.5 1,000 mL/hr (12/02/20 1240)    vasopressin (Septic Shock) infusion 0.04 Units/min (12/02/20 1032)    norepinephrine 2 mcg/min (12/02/20 0330)    cisatracurium (NIMBEX) infusion 2 mcg/kg/min (12/02/20 1414)    fentanyl 200 mcg/hr (12/02/20 1444)    propofol 80 mcg/kg/min (12/02/20 1618)     heparin (porcine), Artificial Tears, sodium chloride, potassium chloride, magnesium sulfate, calcium gluconate **OR** calcium gluconate **OR** calcium gluconate **OR** calcium gluconate, sodium phosphate IVPB **OR** sodium phosphate IVPB **OR** sodium phosphate IVPB **OR** sodium phosphate IVPB, sodium chloride flush, hydrOXYzine, sodium chloride, sodium chloride flush, sodium chloride flush, acetaminophen **OR** acetaminophen, polyethylene glycol, promethazine **OR** ondansetron, guaiFENesin-dextromethorphan  No Known Allergies    Lab Data:  CBC:   Recent Labs     11/30/20  0603 12/01/20  0600 12/02/20  0645   WBC 36.1* 26.2* 15.7*   HGB 12.0* 11.6* 10.0*   HCT 37.3* 35.9* 30.9*   MCV 99.2 97.6 95.1   * 111* 169     BMP:   Recent Labs     12/02/20  0030 12/02/20  0600 12/02/20  1414   * 133* 133*   K 4.2 4.0 3.4*   CL 94* 96* 103   CO2 19* 20* 16*   PHOS 4.9 4.4 2.8   BUN 44* 46* 36*   CREATININE 2.0* 1.9* 1.7*     LIVER PROFILE:   Recent Labs     11/30/20  0603 12/01/20  1450 12/02/20  0600   AST 92* 82* 83*   ALT 50* 42* 40   BILITOT 0.4 0.4 0.3   ALKPHOS 175* 164* 149*     PT/INR:   Recent Labs     11/30/20  0603 12/01/20  0600 12/02/20  0600   PROTIME 15.2* 14.5 12.2   INR 1.25 1.20 1.01     APTT:   Recent Labs     11/30/20  0603 12/01/20  0600 12/02/20  0600   APTT 31.2 81.2* 29.7        Andrew Avendaño MD 12/2/2020 4:20 PM

## 2020-12-02 NOTE — FLOWSHEET NOTE
Gold top and green top drawn and sent to lab     ABG drawn and sent to RT to run     [employer] 7:59 PM     RN had long discussion with wife Paz Orourke and daughter. They are very understanding of DNR CCA status and aware of critical illness. They would like to proceed with vnetilator, rotoprone, CRRT therapy at this time. Emotional support given.      [employer] RN 8:00 PM     All dialysate bags changed to 4K 2.5 Ca at this time as ordered per Jhony Dillon RN, no rate changes made per Dr. Kinsey Gó[place] RN 8:02 PM

## 2020-12-02 NOTE — PROGRESS NOTES
Nephrology  Dialysis Note        [address], [address] [address]  Phone: [phone]  Office Hours: 8:30AM - 4:30PM  Monday - Friday          PROCEDURE:  Patient seen during CRRT      PHYSICIAN:  OLEG      INDICATION:  Acute tubular necrosis      RX:  See dialysis flowsheet for specifics on access, blood flow rate, dialysate baths, duration of dialysis, anticoagulation and other technical information.       COMMENTS:    CONTINUE CRRT  CRITICALLY ILL    Electronically signed by Roland Gomez DO on 12/2/2020 at 9:01 AM    [address],  [address]  [place], [place] [address]  PHONE: [phone]  FAX: [phone]

## 2020-12-02 NOTE — PROGRESS NOTES
Comprehensive Nutrition Assessment    Type and Reason for Visit:  Reassess    Nutrition Recommendations/Plan:   · D/c current EN    Nutrition Assessment:  Pt EN was stopped due to the pt having a residual of 1040 mL. Will d/c the tube feed and follow up in a day or two for goals of care. Malnutrition Assessment:  Malnutrition Status: At risk for malnutrition (Comment)    Context:  Acute Illness       Estimated Daily Nutrient Needs:  Energy (kcal):  2179; Weight Used for Energy Requirements:  Current     Protein (g):  117; Weight Used for Protein Requirements:  Ideal        Fluid (ml/day):  2179; Method Used for Fluid Requirements:  1 ml/kcal      Wounds:  None       Current Nutrition Therapies:    DIET TUBE FEED CONTINUOUS/CYCLIC NPO; Low Calorie High Protein (Vital HP); Nasogastric; Continuous; 25; 60; 24    Anthropometric Measures:  · Height: 5' 11\" (180.3 cm)  · Current Body Weight: 212 lb (96.2 kg)   · Admission Body Weight: 212 lb (96.2 kg)    · Usual Body Weight: 215 lb (97.5 kg)     · Ideal Body Weight: 172 lbs; % Ideal Body Weight 123.3 %   · BMI: 29.6   · BMI Categories: Overweight (BMI 25.0-29. 9)       Nutrition Diagnosis:   · Inadequate oral intake related to impaired respiratory function as evidenced by NPO or clear liquid status due to medical condition, intubation      Nutrition Interventions:   Food and/or Nutrient Delivery:  Discontinue Tube Feeding  Nutrition Education/Counseling:  Education not indicated   Coordination of Nutrition Care:  Continue to monitor while inpatient    Goals:  pt will tolerate the start of EN       Nutrition Monitoring and Evaluation:   Food/Nutrient Intake Outcomes:  Enteral Nutrition Intake/Tolerance  Physical Signs/Symptoms Outcomes:  Biochemical Data, Skin, Weight, Hemodynamic Status     Discharge Planning:     Too soon to determine     Electronically signed by Telly Vail RD, LD on 64/6/73 at 7:15 PM EST    Contact: [phone]

## 2020-12-02 NOTE — FLOWSHEET NOTE
0500: changes made to prone settings. Right side increased to 50 degrees x10 minutes. Left side and center remains the same. 0550: Pt tolerating changes to prone settings. Left side increased to 50 degrees x10 minutes. With continue to monitor.  O2 sat currently 95-97%

## 2020-12-02 NOTE — PLAN OF CARE
Problem: Airway Clearance - Ineffective  Goal: Achieve or maintain patent airway  Outcome: Ongoing     Problem: Gas Exchange - Impaired  Goal: Absence of hypoxia  Outcome: Ongoing  Goal: Promote optimal lung function  Outcome: Ongoing     Problem: Breathing Pattern - Ineffective  Goal: Ability to achieve and maintain a regular respiratory rate  Outcome: Ongoing     Problem:  Body Temperature -  Risk of, Imbalanced  Goal: Ability to maintain a body temperature within defined limits  Outcome: Ongoing  Goal: Will regain or maintain usual level of consciousness  Outcome: Ongoing  Goal: Complications related to the disease process, condition or treatment will be avoided or minimized  Outcome: Ongoing     Problem: Isolation Precautions - Risk of Spread of Infection  Goal: Prevent transmission of infection  Outcome: Ongoing     Problem: Nutrition Deficits  Goal: Optimize nutrtional status  Outcome: Ongoing     Problem: Risk for Fluid Volume Deficit  Goal: Maintain normal heart rhythm  Outcome: Ongoing  Goal: Maintain absence of muscle cramping  Outcome: Ongoing  Goal: Maintain normal serum potassium, sodium, calcium, phosphorus, and pH  Outcome: Ongoing     Problem: Loneliness or Risk for Loneliness  Goal: Demonstrate positive use of time alone when socialization is not possible  Outcome: Ongoing     Problem: Fatigue  Goal: Verbalize increase energy and improved vitality  Outcome: Ongoing     Problem: Patient Education: Go to Patient Education Activity  Goal: Patient/Family Education  Outcome: Ongoing     Problem: Falls - Risk of:  Goal: Will remain free from falls  Description: Will remain free from falls  Outcome: Ongoing  Goal: Absence of physical injury  Description: Absence of physical injury  Outcome: Ongoing     Problem: Skin Integrity:  Goal: Will show no infection signs and symptoms  Description: Will show no infection signs and symptoms  Outcome: Ongoing  Goal: Absence of new skin breakdown  Description: Absence of new skin breakdown  Outcome: Ongoing     Problem: Discharge Planning:  Goal: Participates in care planning  Description: Participates in care planning  Outcome: Ongoing  Goal: Discharged to appropriate level of care  Description: Discharged to appropriate level of care  Outcome: Ongoing     Problem: Airway Clearance - Ineffective:  Goal: Ability to maintain a clear airway will improve  Description: Ability to maintain a clear airway will improve  Outcome: Ongoing     Problem: Anxiety/Stress:  Goal: Level of anxiety will decrease  Description: Level of anxiety will decrease  Outcome: Ongoing     Problem: Aspiration:  Goal: Absence of aspiration  Description: Absence of aspiration  Outcome: Ongoing     Problem:  Bowel Function - Altered:  Goal: Bowel elimination is within specified parameters  Description: Bowel elimination is within specified parameters  Outcome: Ongoing     Problem: Cardiac Output - Decreased:  Goal: Hemodynamic stability will improve  Description: Hemodynamic stability will improve  Outcome: Ongoing     Problem: Fluid Volume - Imbalance:  Goal: Absence of imbalanced fluid volume signs and symptoms  Description: Absence of imbalanced fluid volume signs and symptoms  Outcome: Ongoing     Problem: Gas Exchange - Impaired:  Goal: Levels of oxygenation will improve  Description: Levels of oxygenation will improve  Outcome: Ongoing     Problem: Mental Status - Impaired:  Goal: Mental status will be restored to baseline  Description: Mental status will be restored to baseline  Outcome: Ongoing     Problem: Nutrition Deficit:  Goal: Ability to achieve adequate nutritional intake will improve  Description: Ability to achieve adequate nutritional intake will improve  Outcome: Ongoing     Problem: Pain:  Goal: Pain level will decrease  Description: Pain level will decrease  Outcome: Ongoing  Goal: Recognizes and communicates pain  Description: Recognizes and communicates pain  Outcome: Ongoing  Goal: Control of acute pain  Description: Control of acute pain  Outcome: Ongoing  Goal: Control of chronic pain  Description: Control of chronic pain  Outcome: Ongoing     Problem: Serum Glucose Level - Abnormal:  Goal: Ability to maintain appropriate glucose levels will improve to within specified parameters  Description: Ability to maintain appropriate glucose levels will improve to within specified parameters  Outcome: Ongoing     Problem: Skin Integrity - Impaired:  Goal: Will show no infection signs and symptoms  Description: Will show no infection signs and symptoms  Outcome: Ongoing  Goal: Absence of new skin breakdown  Description: Absence of new skin breakdown  Outcome: Ongoing     Problem: Sleep Pattern Disturbance:  Goal: Appears well-rested  Description: Appears well-rested  Outcome: Ongoing     Problem: Tissue Perfusion, Altered:  Goal: Circulatory function within specified parameters  Description: Circulatory function within specified parameters  Outcome: Ongoing     Problem: Tissue Perfusion - Cardiopulmonary, Altered:  Goal: Absence of angina  Description: Absence of angina  Outcome: Ongoing  Goal: Hemodynamic stability will improve  Description: Hemodynamic stability will improve  Outcome: Ongoing

## 2020-12-02 NOTE — FLOWSHEET NOTE
2030: ABG results sent to Dr Kaitlyn Blancas via [employer]. Dr Kaitlyn Blancas immediately called this nurse back and new orders rec'd to have RT increase Pi on vent from 20 to 28. States that ideal goal is for Vt to be around 550. Pt is currentlyl at 476. Also states that OK for pt to have O2sat 88% or greater, OK to wean Fio2. New orders placed and RT made aware. Family at bedside and notified of new orders. Questions offered and answered. 2045: Family left at this time. Wife given this nurses phone number and encouraged to call in a couple hours for update. States that she will call before going to bed.    2100: RT at bedside and changes made to vent as ordered. 2125Geosman Lema RN at bedside. Pt placed placed in prone position. Reverse trendelenburg at -8 degrees at all times. Prone settings are: 10 minutes on right and left with 45 degrees rotation and 5 minutes in center. [id number]: Pt Fio2 slowly weaned from 100% to 60% while prone. O2sat % Tolerating well. Remains on PEEP 15.     2235: Dr Kaitlyn Blancas noified of repeat ABG's with slight improvement noted. No new orders rec'd. 0015: Pt wife, Lorelei Patel called for update. Security code provided accurately. Updated on pt ABG results. Allowed Lorelei Patel to talk to pt on speaker phone. Emotional support given. 0045: Labs drawn. Sent a gold top and green no gel. Continues to tolerate CRRT.     0150: Pt placed supine with Yas RN at bedside. Pt tolerated supine well. Pt left at Fio2 60% during supine period. Able to maintain O2 sat 95%    0210: TOF 4/4 twitches on temporal area. Nimbex increased at this time. 0310: TOF reassessed. 2/4 twitches noted to temporal area. Pt placed back to proning position at this time. No changes in rotation settings. Continues to tolerate at this time. 0430: Pt placed supine for chest xray. Tolerated well. Placed back to prone position without changes to therapy settings or vent. CRRT continues without issues.

## 2020-12-02 NOTE — PROGRESS NOTES
Infectious Disease Progress Note  2020   Patient Name: Grant Riley : 1945       Reason for visit: F/u COVID-19 pneumonia, acute respiratory failure? History:? Interval history noted, in rotoprone bed  Intubated, sedated and on mechanical ventilation  Physical Exam:  Vital Signs: /73   Pulse 61   Temp 98.4 °F (36.9 °C) (Rectal)   Resp 24   Ht 5' 11\" (1.803 m)   Wt 221 lb 12.5 oz (100.6 kg)   SpO2 97%   BMI 30.93 kg/m²     Gen: intubated and sedated, in Rotoprone bed. Skin: no stigmata of endocarditis  Wounds: C/D/I  HEMT: AT/NC ETT  Eyes: PERRL   Neck: Supple. Trachea midline. No LAD. Chest: Deferred as use of PAPR does not allow for auscultation. Heart: Deferred as use of PAPR does not allow for auscultation. Abd: soft, non-distended, no tenderness, no hepatomegaly. Normoactive bowel sounds. Ext: no clubbing, cyanosis, or edema  Catheter Site: without erythema or tenderness  LDA: CVC: RA PICC line, Urethral catheter :  Neuro: sedated and on the ventilator. Radiologic / Imaging / TESTING  CXR 2020  Impression:   Left greater than right bilateral airspace opacities are noted without   significant change.         Labs:    Recent Results (from the past 24 hour(s))   Calcium, ionized    Collection Time: 20  8:00 PM   Result Value Ref Range    Ionized Ca 1.09 (L) 1.12 - 1.32 mMOL/L    Calcium, Ion 4.36 (L) 4.48 - 5.28 MG/DL   Critical Care Panel    Collection Time: 20  8:00 PM   Result Value Ref Range    Sodium 131 (L) 135 - 145 MMOL/L    Potassium 3.7 3.5 - 5.1 MMOL/L    Chloride 93 (L) 99 - 110 mMol/L    CO2 23 21 - 32 MMOL/L    Anion Gap 15 4 - 16    BUN 51 (H) 6 - 23 MG/DL    CREATININE 2.3 (H) 0.9 - 1.3 MG/DL    Glucose 128 (H) 70 - 99 MG/DL    Calcium 7.9 (L) 8.3 - 10.6 MG/DL    GFR Non-African American 28 (L) >60 mL/min/1.73m2    GFR  34 (L) >60 mL/min/1.73m2    Phosphorus 7.0 (H) 2.5 - 4.9 MG/DL    Magnesium 2.2 1.8 - 2.4 mg/dl   Blood gas, arterial    Collection Time: 12/01/20  8:00 PM   Result Value Ref Range    pH, Bld 7.13 (L) 7.34 - 7.45    pCO2, Arterial 69.0 (H) 32 - 45 MMHG    pO2, Arterial 85 75 - 100 MMHG    Base Excess 7 (H) 0 - 3.3    HCO3, Arterial 23.0 18 - 23 MMOL/L    CO2 Content 25.1 (H) 19 - 24 MMOL/L    O2 Sat 94.9 (L) 96 - 97 %    Carbon Monoxide, Blood 1.7 0 - 5 %    Methemoglobin, Arterial 1.1 <1.5 %    Comment AC/PC 20 RR 24 PEEP 15 100%    Blood gas, arterial    Collection Time: 12/01/20 10:00 PM   Result Value Ref Range    pH, Bld 7.26 (L) 7.34 - 7.45    pCO2, Arterial 47.0 (H) 32 - 45 MMHG    pO2, Arterial 124 (H) 75 - 100 MMHG    Base Excess 6 (H) 0 - 3.3    HCO3, Arterial 21.1 18 - 23 MMOL/L    CO2 Content 22.5 19 - 24 MMOL/L    O2 Sat 96.7 96 - 97 %    Carbon Monoxide, Blood 1.6 0 - 5 %    Methemoglobin, Arterial 1.5 (H) <1.5 %    Comment PC28/100%+15/RR24    Calcium, ionized    Collection Time: 12/02/20 12:30 AM   Result Value Ref Range    Ionized Ca 1.02 (L) 1.12 - 1.32 mMOL/L    Calcium, Ion 4.08 (L) 4.48 - 5.28 MG/DL   Critical Care Panel    Collection Time: 12/02/20 12:30 AM   Result Value Ref Range    Sodium 131 (L) 135 - 145 MMOL/L    Potassium 4.2 3.5 - 5.1 MMOL/L    Chloride 94 (L) 99 - 110 mMol/L    CO2 19 (L) 21 - 32 MMOL/L    Anion Gap 18 (H) 4 - 16    BUN 44 (H) 6 - 23 MG/DL    CREATININE 2.0 (H) 0.9 - 1.3 MG/DL    Glucose 136 (H) 70 - 99 MG/DL    Calcium 7.6 (L) 8.3 - 10.6 MG/DL    GFR Non- 33 (L) >60 mL/min/1.73m2    GFR  40 (L) >60 mL/min/1.73m2    Phosphorus 4.9 2.5 - 4.9 MG/DL    Magnesium 2.3 1.8 - 2.4 mg/dl   Comprehensive Metabolic Panel w/ Reflex to MG    Collection Time: 12/02/20  6:00 AM   Result Value Ref Range    Sodium 133 (L) 135 - 145 MMOL/L    Potassium 4.0 3.5 - 5.1 MMOL/L    Chloride 96 (L) 99 - 110 mMol/L    CO2 20 (L) 21 - 32 MMOL/L    BUN 46 (H) 6 - 23 MG/DL    CREATININE 1.9 (H) 0.9 - 1.3 MG/DL    Glucose 148 (H) 70 - 99 MG/DL    Calcium 7.4 (L) 8.3 - 10.6 MG/DL    Alb 2.5 (L) 3.4 - 5.0 GM/DL    Total Protein 5.2 (L) 6.4 - 8.2 GM/DL    Total Bilirubin 0.3 0.0 - 1.0 MG/DL    ALT 40 10 - 40 U/L    AST 83 (H) 15 - 37 IU/L    Alkaline Phosphatase 149 (H) 40 - 128 IU/L    GFR Non- 35 (L) >60 mL/min/1.73m2    GFR  42 (L) >60 mL/min/1.73m2    Anion Gap 17 (H) 4 - 16   Protime-INR    Collection Time: 12/02/20  6:00 AM   Result Value Ref Range    Protime 12.2 11.7 - 14.5 SECONDS    INR 1.01 INDEX   APTT    Collection Time: 12/02/20  6:00 AM   Result Value Ref Range    aPTT 29.7 25.1 - 37.1 SECONDS   Fibrinogen    Collection Time: 12/02/20  6:00 AM   Result Value Ref Range    Fibrinogen 181 (L) 196.9 - 442.1 MG/DL   D-Dimer, Quantitative    Collection Time: 12/02/20  6:00 AM   Result Value Ref Range    D-Dimer, Quant 4319 (H) <230 NG/mL(DDU)   C-Reactive Protein    Collection Time: 12/02/20  6:00 AM   Result Value Ref Range    CRP, High Sensitivity 121.5 mg/L   Calcium, ionized    Collection Time: 12/02/20  6:00 AM   Result Value Ref Range    Ionized Ca 1.00 (L) 1.12 - 1.32 mMOL/L    Calcium, Ion 4.00 (L) 4.48 - 5.28 MG/DL   SPECIMEN REJECTION    Collection Time: 12/02/20  6:00 AM   Result Value Ref Range    Rejected Test CD     Reason for Rejection UNABLE TO PERFORM TESTING:    Magnesium    Collection Time: 12/02/20  6:00 AM   Result Value Ref Range    Magnesium 2.6 (H) 1.8 - 2.4 mg/dl   Phosphorus    Collection Time: 12/02/20  6:00 AM   Result Value Ref Range    Phosphorus 4.4 2.5 - 4.9 MG/DL   CBC Auto Differential    Collection Time: 12/02/20  6:45 AM   Result Value Ref Range    WBC 15.7 (H) 4.0 - 10.5 K/CU MM    RBC 3.25 (L) 4.6 - 6.2 M/CU MM    Hemoglobin 10.0 (L) 13.5 - 18.0 GM/DL    Hematocrit 30.9 (L) 42 - 52 %    MCV 95.1 78 - 100 FL    MCH 30.8 27 - 31 PG    MCHC 32.4 32.0 - 36.0 %    RDW 14.4 11.7 - 14.9 %    Platelets 116 165 - 952 K/CU MM    MPV 11.8 (H) 7.5 - 11.1 FL    Myelocyte Percent 1 (H) 0.0 %    Metamyelocytes Relative with minor glomerular abnormality    Volume depletion    Acute kidney injury (MICHELLE) with acute tubular necrosis (ATN) (HCC)    Acidosis    AIVR (accelerated idioventricular rhythm) (HCC)       Active Problems  Active Problems:    Acute respiratory failure with hypoxia (HCC)    MICHELLE (acute kidney injury) (HCC)    Electrolyte imbalance    Isolated proteinuria with minor glomerular abnormality    Volume depletion    Acute kidney injury (MICHELLE) with acute tubular necrosis (ATN) (HCC)    Acidosis    AIVR (accelerated idioventricular rhythm) (HCC)  Resolved Problems:    * No resolved hospital problems. *      Impression and plan   Summary and rationale: Patient is a 76 y.o.   male critical COVID-19 pneumonia with acute kidney injury, transaminitis, Covid associated coagulopathy. On CRRT    Clinical status: continues to worsen, hypothermic, leukocytosis is on downward trend, may be improving or could be a sign of bone marrow depression (worsening)   Therapeutic:  o Ongoing antibiotics: Meropenem 11/28-  o Antiviral agent: remdesivir 11/24-28  o Anti-inflammatory agents:  - Dexamethasone: 11/22-24  - Solu-Medrol: 11/25-12/1  - Start hydrocortisone on 12/1  o Completed antibiotics:   o Convalescent plasma receipt:  o Other agents:    Diagnostic:   F/u: Interleukin-6, beta-1 3D glucan ,blood culture 11/28, 0/2   Other: Palliative care on consult.         Electronically signed by: Electronically signed by Zelalem Costa MD on 12/2/2020 at 4:25 PM

## 2020-12-02 NOTE — PROGRESS NOTES
Pt rotated supine w/ Stephane Tejeda. Pt supplemented with 100% fio2 in preparation for rotation but then resumed vent settings of ACPC 24 FiO2 60% and PEEP 15. Eye care and oral care provided. Echo completed while supine. Pt in supine position for total time of 1 hour with O2 saturation 92-95%. Pt returned to prone position w/ Sg Adame RN at bedside. Oxygen saturation 96-99%. Current rotoprone settings are 50 degrees for 10 minutes on each side.

## 2020-12-02 NOTE — FLOWSHEET NOTE
Patient positioned supine to allow for 2 family members to come visit     Brandon Santiago RN 7:02 PM

## 2020-12-02 NOTE — PROGRESS NOTES
and hypokalemia, IV replacement   Hypertension, essential    DNR CCA/DNI  Wife and daughter saw him yesterday evening  No changes in management  Attempt supine trial once he qualifies - peep too high    Diet No diet orders on file   DVT Prophylaxis [] Lovenox, []  Heparin, [] SCDs, []No VTE prophylaxis, patient ambulating   GI Prophylaxis [] PPI, [] H2 Blocker, [] No GI prophylaxis, patient is receiving diet/Tube Feeds   Code Status Limited   Disposition Poor prognosis   MDM [] Low, [] Moderate,[x]  High  Patient's risk as above due to     [] One or more chronic illnesses with severe exacerbation or progression    [] Acute or chronic illnesses or injuries that pose a threat to life or bodily function    [] An abrupt change in neurological status    [] Decision not to resuscitate     [x] Drug therapy requiring intensive monitoring for toxicity - iv propofol     Subjective:     Pt S&E. No improvement overnight. Still not tolerating supine at all  No fevers overnight     No ROS as he is intubated and sedated. Objective: Intake/Output Summary (Last 24 hours) at 12/2/2020 0813  Last data filed at 12/2/2020 0704  Gross per 24 hour   Intake 2803.16 ml   Output 3842 ml   Net -1038.84 ml      Vitals:   Vitals:    12/02/20 0700   BP:    Pulse: 56   Resp:    Temp: 97.2 °F (36.2 °C)   SpO2: 97%     Physical Exam:    GEN sedated male, laying in bed in no apparent distress. EYES Pupils are equally round. No scleral erythema, discharge, or conjunctivitis. HENT Mucous membranes are moist. +ETT +NGT  NECK No apparent thyromegaly or masses. RESP +rales or rhonchi. Symmetric chest movement while on room air. CARDIO/VASC S1/S2 auscultated. Regular rate without appreciable murmurs, rubs, or gallops. Peripheral pulses equal bilaterally and palpable. +1 pitting edema in extremities   GI Abdomen is soft without significant tenderness, masses, or guarding. Bowel sounds are normoactive. Rectal exam deferred.    SERGIO Joy catheter is present. MSK No gross joint deformities. No spontaneous movement noted  SKIN Normal coloration, warm, dry. NEURO Does not follow directions. No response to verbal or painful stimuli  PSYCH Sedated on propofol.  Fentanyl    In rotoprone     Medications:   Medications:    enoxaparin  30 mg Subcutaneous Daily    hydrocortisone sodium succinate PF  100 mg Intravenous Q8H    meropenem  1 g Intravenous Q8H    famotidine (PEPCID) injection  10 mg Intravenous BID    sodium chloride flush  10 mL Intravenous 2 times per day    albuterol sulfate HFA  4 puff Inhalation Q4H    And    ipratropium  4 puff Inhalation Q4H    chlorhexidine  15 mL Mouth/Throat BID    lidocaine PF  5 mL Intradermal See Admin Instructions    sodium chloride flush  10 mL Intravenous 2 times per day    sodium chloride flush  10 mL Intravenous 2 times per day    vitamin D  2,000 Units Oral Daily    atorvastatin  40 mg Oral Daily      Infusions:    heparin 5000 units CRRT syringe 0.5 mL/hr at 12/01/20 1013    prismaSATE BGK 4/2.5 250 mL/hr (12/01/20 1951)    prismaSATE BGK 4/2.5 1,500 mL/hr (12/02/20 0623)    prismaSATE BGK 4/2.5 1,000 mL/hr (12/02/20 8299)    vasopressin (Septic Shock) infusion 0.04 Units/min (12/02/20 0121)    norepinephrine 2 mcg/min (12/02/20 0330)    cisatracurium (NIMBEX) infusion 2 mcg/kg/min (12/02/20 0209)    fentanyl 200 mcg/hr (12/02/20 0345)    propofol 80 mcg/kg/min (12/02/20 0738)     PRN Meds: heparin (porcine), 2,000 Units, PRN  Artificial Tears, , Q4H PRN  sodium chloride, 2,000 mL, PRN  potassium chloride, 20 mEq, PRN  magnesium sulfate, 1 g, PRN  calcium gluconate, 1 g, PRN    Or  calcium gluconate, 2 g, PRN    Or  calcium gluconate, 3 g, PRN    Or  calcium gluconate, 4 g, PRN  sodium phosphate IVPB, 6 mmol, PRN    Or  sodium phosphate IVPB, 12 mmol, PRN    Or  sodium phosphate IVPB, 18 mmol, PRN    Or  sodium phosphate IVPB, 24 mmol, PRN  sodium chloride flush, 10 mL, PRN  hydrOXYzine, 50 mg, Q6H PRN  sodium chloride, 30 mL, PRN  sodium chloride flush, 10 mL, PRN  sodium chloride flush, 10 mL, PRN  acetaminophen, 650 mg, Q6H PRN    Or  acetaminophen, 650 mg, Q6H PRN  polyethylene glycol, 17 g, Daily PRN  promethazine, 12.5 mg, Q6H PRN    Or  ondansetron, 4 mg, Q6H PRN  guaiFENesin-dextromethorphan, 5 mL, Q4H PRN          Electronically signed by Ruth Alston DO on 12/2/2020 at 8:13 AM

## 2020-12-03 NOTE — PROGRESS NOTES
Lab called by RN to inquire about ionized calcium drawn at 1500. Lab not resulted in the computer. Will await lab result and order replacement per eMAR.

## 2020-12-03 NOTE — PROGRESS NOTES
Rotoprone settings increased to:  L side up 55 degrees for 10 min  R side up 45 degrees for 10 min. FiO2 decreased to 85%. Patient tolerating well w/ O2 saturation 94-96% with the goal to keep saturations >88%.

## 2020-12-03 NOTE — PROGRESS NOTES
Pulmonary and Critical Care  Progress Note      VITALS:  /68   Pulse 63   Temp 97.7 °F (36.5 °C) (Rectal)   Resp 24   Ht 5' 11\" (1.803 m)   Wt 221 lb 12.5 oz (100.6 kg)   SpO2 97%   BMI 30.93 kg/m²     Subjective:   CHIEF COMPLAINT :SOB     HPI:                The patient is on the vent and sedated. He had CRRT, He has minimal response to painful stimuli. Objective:   PHYSICAL EXAM:    LUNGS:Bilateral basal crackles  Abd-soft, BS+,NT  Ext -No pedal edema  CVS-s1s2, no murmurs      DATA:    CBC:  Recent Labs     12/01/20  0600 12/02/20  0645 12/03/20  0700   WBC 26.2* 15.7* 18.5*   RBC 3.68* 3.25* 3.11*   HGB 11.6* 10.0* 10.0*   HCT 35.9* 30.9* 29.7*   * 169 126*   MCV 97.6 95.1 95.5   MCH 31.5* 30.8 32.2*   MCHC 32.3 32.4 33.7   RDW 14.6 14.4 14.5   NRBC  --   --  1   SEGSPCT 79.0* 84.0* 84.0*   BANDSPCT 12* 5 9      BMP:  Recent Labs     12/02/20 2015 12/03/20  0130 12/03/20  0700   * 130* 131*   K 4.2 4.9 4.7   CL 96* 97* 97*   CO2 22 19* 19*   BUN 38* 37* 36*   CREATININE 1.8* 1.6* 1.7*   CALCIUM 7.6* 7.3* 7.3*   GLUCOSE 117* 131* 123*      ABG:  Recent Labs     12/02/20  0800 12/02/20 2000 12/03/20  0800   PH 7.33* 7.40 7.31*   PO2ART 71* 126* 120*   [id number] 39.0 36.0 44.0   O2SAT 93.8* 96.4 96.4     BNP  No results found for: BNP   D-Dimer:  Lab Results   Component Value Date    DDIMER 4995 (H) 12/03/2020      Radiology:   1. Support devices appear unchanged in position. 2. Worsening opacification within the lungs bilaterally with a somewhat    peripheral predominance.  Findings may represent an atypical or viral    pneumonia.       1.       Assessment/Plan     Patient Active Problem List    Diagnosis Date Noted    AIVR (accelerated idioventricular rhythm) (HCC)     Acute kidney injury (MICHELLE) with acute tubular necrosis (ATN) (HCC)     Acidosis     [facility] ([facility] [address]     Electrolyte imbalance     Isolated proteinuria with minor glomerular abnormality     Volume depletion     Acute respiratory failure with hypoxia ([facility] [address] 11/22/2020    KISHORE on CPAP 06/19/2017   Acute hypoxic resp failure sec to non Cardiogenic Pulmonary edema  VDRF  Leukocytosis - improving  Bilateral Pneumonia  Non Calcified right sided Pulmonary nodules  COVID Pneumonia  MICHELLE- improving  AG Metabolic acidosis  Moderate malnutrition       1. Abx  2. F/u C&S  3. CRRT per renal  4. Wean fio2 down to sats >88%  5. Decrease Hydrocortisone  6. CXR in am  7. Tube feeds when off Rotapro  8. Inhalers  9. Prognosis guarded  10. C/w present management  No follow-ups on file.     Electronically signed by Clare Mccann MD on 12/3/2020 at 12:29 PM

## 2020-12-03 NOTE — PROGRESS NOTES
12/03/20 0311   Vent Information   Vent Type 980   Vent Mode AC/PC   Vt Ordered 0 mL   Rate Set 24 bmp   Peak Flow 0 L/min   Pressure Support 0 cmH20   FiO2  60 %   SpO2 93 %   SpO2/FiO2 ratio 155   Sensitivity 3   PEEP/CPAP 10   I Time/ I Time % 0 s   Humidification Source HME   Vent Patient Data   High Peep/I Pressure 28   Peak Inspiratory Pressure 39 cmH2O   Mean Airway Pressure 20 cmH20   Rate Measured 24 br/min   Vt Exhaled 720 mL   Minute Volume 17.3 Liters   I:E Ratio 1:2.00   Plateau Pressure 29 LUQ80   Static Compliance 38 mL/cmH2O   Total PEEP 12 cmH20   Auto PEEP 1.2 cmH20   Cough/Sputum   Sputum How Obtained Endotracheal;Suctioned   $Obtained Sample $Nasotracheal Suction   Cough Productive   Sputum Amount Small   Sputum Color Creamy;Tan;Yellow   Tenacity Thick   Spontaneous Breathing Trial (SBT) RT Doc   Pulse 57   Breath Sounds   Right Upper Lobe Diminished   Right Middle Lobe Diminished   Right Lower Lobe Diminished   Left Upper Lobe Diminished   Left Lower Lobe Diminished   Alarm Settings   High Pressure Alarm 50 cmH2O   Delay Alarm 20 sec(s)   Low Minute Volume Alarm 2.5 L/min   Apnea (secs) 20 secs   High Respiratory Rate 40 br/min   Low Exhaled Vt  250 mL   ETT (adult)   Placement Date/Time: 11/25/20 1750   Timeout: Patient;Procedure; Appropriate Equipment  Preoxygenation: Yes  Tube Size: 7.5 mm  Location: Oral  Insertion attempts: 1  Secured at: (c) 24 cm  Placed By: (c) Other (Comment)   Secured at 26 cm   Measured From [address] [place] [address],[address] By [employer] tube jones   Site Condition Dry   Cuff Pressure   (mlt)

## 2020-12-03 NOTE — PROGRESS NOTES
Patient placed flat supine for 1 hour during assessment and bath   Vent Settings while supine   Fi02 100%  PEEP 10  With Sp02 sustaining 94%

## 2020-12-03 NOTE — PROGRESS NOTES
Currently Rotoprone bed settings with Sp02 sustaining between 88-92%    L side up 60 degrees for 10 mins   R side up 45 degrees for 10 mins  Center 0 mins    Pt does not tolerate R side up as well.

## 2020-12-03 NOTE — PROGRESS NOTES
Infectious Disease Progress Note  12/3/2020   Patient Name: Germán Mobley : 1945       Reason for visit: F/u COVID-19 pneumonia, acute respiratory failure? History:? Interval history noted, in rotoprone bed  Intubated, sedated and on mechanical ventilation  Physical Exam:  Vital Signs: /68   Pulse 61   Temp 98.4 °F (36.9 °C) (Rectal)   Resp 24   Ht 5' 11\" (1.803 m)   Wt 221 lb 12.5 oz (100.6 kg)   SpO2 96%   BMI 30.93 kg/m²     Gen: intubated and sedated, in Rotoprone bed. Skin: no stigmata of endocarditis  Wounds: C/D/I  HEMT: AT/NC ETT  Eyes: PERRL   Neck: Supple. Trachea midline. No LAD. Chest: Deferred as use of PAPR does not allow for auscultation. Heart: Deferred as use of PAPR does not allow for auscultation. Abd: soft, non-distended, no tenderness, no hepatomegaly. Normoactive bowel sounds. Ext: no clubbing, cyanosis, or edema  Catheter Site: without erythema or tenderness  LDA: CVC: RA PICC line, Urethral catheter :  Neuro: sedated and on the ventilator. Radiologic / Imaging / TESTING  CXR 2020  Impression:   Left greater than right bilateral airspace opacities are noted without   significant change.         Labs:    Recent Results (from the past 24 hour(s))   Calcium, ionized    Collection Time: 20  2:14 PM   Result Value Ref Range    Ionized Ca 0.87 (L) 1.12 - 1.32 mMOL/L    Calcium, Ion 3.48 (L) 4.48 - 5.28 MG/DL   Critical Care Panel    Collection Time: 20  2:14 PM   Result Value Ref Range    Sodium 133 (L) 135 - 145 MMOL/L    Potassium 3.4 (L) 3.5 - 5.1 MMOL/L    Chloride 103 99 - 110 mMol/L    CO2 16 (L) 21 - 32 MMOL/L    Anion Gap 14 4 - 16    BUN 36 (H) 6 - 23 MG/DL    CREATININE 1.7 (H) 0.9 - 1.3 MG/DL    Glucose 114 (H) 70 - 99 MG/DL    Calcium 5.7 (LL) 8.3 - 10.6 MG/DL    GFR Non- 39 (L) >60 mL/min/1.73m2    GFR  48 (L) >60 mL/min/1.73m2    Phosphorus 2.8 2.5 - 4.9 MG/DL    Magnesium 2.0 1.8 - 2.4 mg/dl Blood gas, arterial    Collection Time: 12/02/20  8:00 PM   Result Value Ref Range    pH, Bld 7.40 7.34 - 7.45    pCO2, Arterial 36.0 32 - 45 MMHG    pO2, Arterial 126 (H) 75 - 100 MMHG    Base Excess 2 0 - 3.3    HCO3, Arterial 22.3 18 - 23 MMOL/L    CO2 Content 23.4 19 - 24 MMOL/L    O2 Sat 96.4 96 - 97 %    Carbon Monoxide, Blood 1.8 0 - 5 %    Methemoglobin, Arterial 1.6 (H) <1.5 %    Comment AC24 PC 28 60% 10peep    Calcium, ionized    Collection Time: 12/02/20  8:15 PM   Result Value Ref Range    Ionized Ca 1.06 (L) 1.12 - 1.32 mMOL/L    Calcium, Ion 4.24 (L) 4.48 - 5.28 MG/DL   Critical Care Panel    Collection Time: 12/02/20  8:15 PM   Result Value Ref Range    Sodium 130 (L) 135 - 145 MMOL/L    Potassium 4.2 3.5 - 5.1 MMOL/L    Chloride 96 (L) 99 - 110 mMol/L    CO2 22 21 - 32 MMOL/L    Anion Gap 12 4 - 16    BUN 38 (H) 6 - 23 MG/DL    CREATININE 1.8 (H) 0.9 - 1.3 MG/DL    Glucose 117 (H) 70 - 99 MG/DL    Calcium 7.6 (L) 8.3 - 10.6 MG/DL    GFR Non- 37 (L) >60 mL/min/1.73m2    GFR  45 (L) >60 mL/min/1.73m2    Phosphorus 2.5 2.5 - 4.9 MG/DL    Magnesium 2.5 (H) 1.8 - 2.4 mg/dl   Calcium, ionized    Collection Time: 12/03/20  1:30 AM   Result Value Ref Range    Ionized Ca 0.96 (L) 1.12 - 1.32 mMOL/L    Calcium, Ion 3.84 (L) 4.48 - 5.28 MG/DL   Critical Care Panel    Collection Time: 12/03/20  1:30 AM   Result Value Ref Range    Sodium 130 (L) 135 - 145 MMOL/L    Potassium 4.9 3.5 - 5.1 MMOL/L    Chloride 97 (L) 99 - 110 mMol/L    CO2 19 (L) 21 - 32 MMOL/L    Anion Gap 14 4 - 16    BUN 37 (H) 6 - 23 MG/DL    CREATININE 1.6 (H) 0.9 - 1.3 MG/DL    Glucose 131 (H) 70 - 99 MG/DL    Calcium 7.3 (L) 8.3 - 10.6 MG/DL    GFR Non- 42 (L) >60 mL/min/1.73m2    GFR  51 (L) >60 mL/min/1.73m2    Phosphorus 3.4 2.5 - 4.9 MG/DL    Magnesium 2.5 (H) 1.8 - 2.4 mg/dl   CBC Auto Differential    Collection Time: 12/03/20  7:00 AM   Result Value Ref Range    WBC 18.5 (H) 4.0 - 10.5 K/CU MM    RBC 3.11 (L) 4.6 - 6.2 M/CU MM    Hemoglobin 10.0 (L) 13.5 - 18.0 GM/DL    Hematocrit 29.7 (L) 42 - 52 %    MCV 95.5 78 - 100 FL    MCH 32.2 (H) 27 - 31 PG    MCHC 33.7 32.0 - 36.0 %    RDW 14.5 11.7 - 14.9 %    MPV 12.7 (H) 7.5 - 11.1 FL   Comprehensive Metabolic Panel w/ Reflex to MG    Collection Time: 12/03/20  7:00 AM   Result Value Ref Range    Sodium 131 (L) 135 - 145 MMOL/L    Potassium 4.7 3.5 - 5.1 MMOL/L    Chloride 97 (L) 99 - 110 mMol/L    CO2 19 (L) 21 - 32 MMOL/L    BUN 36 (H) 6 - 23 MG/DL    CREATININE 1.7 (H) 0.9 - 1.3 MG/DL    Glucose 123 (H) 70 - 99 MG/DL    Calcium 7.3 (L) 8.3 - 10.6 MG/DL    Alb 2.5 (L) 3.4 - 5.0 GM/DL    Total Protein 5.2 (L) 6.4 - 8.2 GM/DL    Total Bilirubin 0.4 0.0 - 1.0 MG/DL    ALT 55 (H) 10 - 40 U/L     (H) 15 - 37 IU/L    Alkaline Phosphatase 156 (H) 40 - 128 IU/L    GFR Non- 39 (L) >60 mL/min/1.73m2    GFR  48 (L) >60 mL/min/1.73m2    Anion Gap 15 4 - 16   Blood gas, arterial    Collection Time: 12/03/20  8:00 AM   Result Value Ref Range    pH, Bld 7.31 (L) 7.34 - 7.45    pCO2, Arterial 44.0 32 - 45 MMHG    pO2, Arterial 120 (H) 75 - 100 MMHG    Base Excess 4 (H) 0 - 3.3    HCO3, Arterial 22.2 18 - 23 MMOL/L    CO2 Content 23.6 19 - 24 MMOL/L    O2 Sat 96.4 96 - 97 %    Carbon Monoxide, Blood 1.5 0 - 5 %    Methemoglobin, Arterial 1.5 (H) <1.5 %    Comment ACPC24 P12 .90       CULTURE results: Invalid input(s): BLOOD CULTURE,  URINE CULTURE, SURGICAL CULTURE    Diagnosis:  Patient Active Problem List   Diagnosis    KISHORE on CPAP    Acute respiratory failure with hypoxia (HCC)    MICHELLE [facility]) ([facility] [address]    Electrolyte imbalance    Isolated proteinuria with minor glomerular abnormality    Volume depletion    Acute kidney injury (MICHELLE) with acute tubular necrosis (ATN) (HCC)    Acidosis    AIVR (accelerated idioventricular rhythm) ([facility])       Active Problems  Active Problems:    Acute

## 2020-12-03 NOTE — PROGRESS NOTES
Nephrology  Dialysis Note        [address] [facility]  Phone: [phone]  Office Hours: 8:30AM - 4:30PM  Monday - Friday          PROCEDURE:  Patient seen during CRRT      PHYSICIAN:  OLEG      INDICATION:  Acute tubular necrosis      RX:  See dialysis flowsheet for specifics on access, blood flow rate, dialysate baths, duration of dialysis, anticoagulation and other technical information.       COMMENTS:    CRITICALLY ILL, BACK %FIO2 AND PEEP OF 10  CONTINUE CRRT    Electronically signed by Gurmeet Laureano DO on 12/3/2020 at 8:29 AM    [address]  [place] [address],  [address]  [place], [place] [address]  PHONE: [phone]  FAX: [phone]

## 2020-12-03 NOTE — PROGRESS NOTES
Pts oxygen saturation sustaining 85-88%. RT at bedside. PEEP increased to 12, FiO2 increased to 100%. Rotoprone settings changed to:  L side up 55 degrees for 10 min  R side up 40 degrees for 5 min. O2 saturation 92-95% at this time.

## 2020-12-03 NOTE — PLAN OF CARE
Problem: Airway Clearance - Ineffective  Goal: Achieve or maintain patent airway  Outcome: Ongoing     Problem: Gas Exchange - Impaired  Goal: Absence of hypoxia  Outcome: Ongoing  Goal: Promote optimal lung function  Outcome: Ongoing     Problem: Breathing Pattern - Ineffective  Goal: Ability to achieve and maintain a regular respiratory rate  Outcome: Ongoing     Problem:  Body Temperature -  Risk of, Imbalanced  Goal: Ability to maintain a body temperature within defined limits  Outcome: Ongoing  Goal: Will regain or maintain usual level of consciousness  Outcome: Ongoing  Goal: Complications related to the disease process, condition or treatment will be avoided or minimized  Outcome: Ongoing     Problem: Isolation Precautions - Risk of Spread of Infection  Goal: Prevent transmission of infection  Outcome: Ongoing     Problem: Nutrition Deficits  Goal: Optimize nutrtional status  Outcome: Ongoing     Problem: Risk for Fluid Volume Deficit  Goal: Maintain normal heart rhythm  Outcome: Ongoing  Goal: Maintain absence of muscle cramping  Outcome: Ongoing  Goal: Maintain normal serum potassium, sodium, calcium, phosphorus, and pH  Outcome: Ongoing     Problem: Loneliness or Risk for Loneliness  Goal: Demonstrate positive use of time alone when socialization is not possible  Outcome: Ongoing     Problem: Fatigue  Goal: Verbalize increase energy and improved vitality  Outcome: Ongoing     Problem: Patient Education: Go to Patient Education Activity  Goal: Patient/Family Education  Outcome: Ongoing     Problem: Falls - Risk of:  Goal: Will remain free from falls  Description: Will remain free from falls  Outcome: Ongoing  Goal: Absence of physical injury  Description: Absence of physical injury  Outcome: Ongoing     Problem: Skin Integrity:  Goal: Will show no infection signs and symptoms  Description: Will show no infection signs and symptoms  Outcome: Ongoing  Goal: Absence of new skin breakdown  Description: Absence Control of acute pain  Description: Control of acute pain  Outcome: Ongoing  Goal: Control of chronic pain  Description: Control of chronic pain  Outcome: Ongoing     Problem: Serum Glucose Level - Abnormal:  Goal: Ability to maintain appropriate glucose levels will improve to within specified parameters  Description: Ability to maintain appropriate glucose levels will improve to within specified parameters  Outcome: Ongoing     Problem: Skin Integrity - Impaired:  Goal: Will show no infection signs and symptoms  Description: Will show no infection signs and symptoms  Outcome: Ongoing  Goal: Absence of new skin breakdown  Description: Absence of new skin breakdown  Outcome: Ongoing     Problem: Sleep Pattern Disturbance:  Goal: Appears well-rested  Description: Appears well-rested  Outcome: Ongoing     Problem: Tissue Perfusion, Altered:  Goal: Circulatory function within specified parameters  Description: Circulatory function within specified parameters  Outcome: Ongoing     Problem: Tissue Perfusion - Cardiopulmonary, Altered:  Goal: Absence of angina  Description: Absence of angina  Outcome: Ongoing  Goal: Hemodynamic stability will improve  Description: Hemodynamic stability will improve  Outcome: Ongoing

## 2020-12-04 NOTE — PROGRESS NOTES
Pulmonary and Critical Care  Progress Note      VITALS:  BP (!) 139/45   Pulse 74   Temp 94.7 °F (34.8 °C) (Rectal)   Resp 24   Ht 5' 11\" (1.803 m)   Wt 221 lb 12.5 oz (100.6 kg)   SpO2 96%   BMI 30.93 kg/m²     Subjective:   CHIEF COMPLAINT :SOB     HPI:                The patient is on the vent and sedated. He has minimal response to the painful stimuli. His Fio2 is slowly coming down    Objective:   PHYSICAL EXAM:    LUNGS:Bilateral basal crackles  Abd-soft, BS+,NT  Ext -No pedal edema  CVS-s1s2, no murmurs      DATA:    CBC:  Recent Labs     12/02/20  0645 12/03/20  0700 12/04/20  0600   WBC 15.7* 18.5* 18.0*   RBC 3.25* 3.11* 2.84*   HGB 10.0* 10.0* 8.9*   HCT 30.9* 29.7* 27.6*    126* 61*   MCV 95.1 95.5 97.2   MCH 30.8 32.2* 31.3*   MCHC 32.4 33.7 32.2   RDW 14.4 14.5 14.2   NRBC  --  1 1   SEGSPCT 84.0* 84.0* 81.0*   BANDSPCT 5 9 9      BMP:  Recent Labs     12/04/20  0300 12/04/20  0600 12/04/20  0842   * 133* 135   K 4.3 4.4 4.6   CL 98* 99 97*   CO2 23 23 20*   BUN 28* 28* 29*   CREATININE 1.3 1.3 1.4*   CALCIUM 7.4* 7.1* 7.3*   GLUCOSE 126* 110* 111*      ABG:  Recent Labs     12/03/20  0800 12/03/20  2145 12/04/20  0800   PH 7.31* 7.32* 7.33*   PO2ART 120* 157* 80   [id number] 44.0 43.0 42.0   O2SAT 96.4 97.0 94.4*     BNP  No results found for: BNP   D-Dimer:  Lab Results   Component Value Date    DDIMER >5250 (H) 12/04/2020      Radiology:   . The ET tube is 7 cm above the cindy. 2. No significant change in bilateral airspace disease, concerning for    multifocal pneumonia and/or edema.       1.       Assessment/Plan     Patient Active Problem List    Diagnosis Date Noted    [facility]     Acute kidney injury (MICHELLE) with acute tubular necrosis (ATN) ([facility]     Acidosis     MICHELLE [facility]) ([facility] [address]     Electrolyte imbalance     Isolated proteinuria with minor glomerular abnormality     Volume depletion     Acute respiratory failure with hypoxia ([facility] [address] 11/22/2020    KISHORE on CPAP 06/19/2017   Acute hypoxic resp failure sec to non Cardiogenic Pulmonary edema  VDRF  Leukocytosis - improving  Bilateral Pneumonia  Non Calcified right sided Pulmonary nodules  COVID Pneumonia  MICHELLE- improving  AG Metabolic acidosis  Moderate malnutrition       1. CRRT per renal  2. Abx  3. F/u C&S  4. Keep sats > 88%  5. Tube feeds if not TPN  6. Inhalers  7. Prognosis guarded  8. C.w present management  No follow-ups on file.     Electronically signed by Frandy Roberts MD on 12/4/2020 at 11:57 AM

## 2020-12-04 NOTE — PROGRESS NOTES
Infectious Disease Progress Note  2020   Patient Name: Kiel Haider : 1945       Reason for visit: F/u COVID-19 pneumonia, acute respiratory failure? History:? Interval history noted, in rotoprone bed  Intubated, sedated and on mechanical ventilation  Physical Exam:  Vital Signs: BP (!) 139/45   Pulse 53   Temp 94.7 °F (34.8 °C) (Rectal)   Resp 24   Ht 5' 11\" (1.803 m)   Wt 221 lb 12.5 oz (100.6 kg)   SpO2 99%   BMI 30.93 kg/m²     Gen: intubated and sedated, in Rotoprone bed. Skin: no stigmata of endocarditis  Wounds: C/D/I  HEMT: AT/NC ETT  Eyes: PERRL   Neck: Supple. Trachea midline. No LAD. Chest: Deferred as use of PAPR does not allow for auscultation. Heart: Deferred as use of PAPR does not allow for auscultation. Abd: soft, non-distended, no tenderness, no hepatomegaly. Normoactive bowel sounds. Ext: no clubbing, cyanosis, or edema  Catheter Site: without erythema or tenderness  LDA: CVC: RA PICC line, Urethral catheter :  Neuro: sedated and on the ventilator. Radiologic / Imaging / TESTING  CXR 2020  Impression:   Left greater than right bilateral airspace opacities are noted without   significant change.         Labs:    Recent Results (from the past 24 hour(s))   Calcium, ionized    Collection Time: 20  3:00 PM   Result Value Ref Range    Ionized Ca 1.04 (L) 1.12 - 1.32 mMOL/L    Calcium, Ion 4.16 (L) 4.48 - 5.28 MG/DL   Critical Care Panel    Collection Time: 20  3:00 PM   Result Value Ref Range    Sodium 131 (L) 135 - 145 MMOL/L    Potassium 4.8 3.5 - 5.1 MMOL/L    Chloride 98 (L) 99 - 110 mMol/L    CO2 18 (L) 21 - 32 MMOL/L    Anion Gap 15 4 - 16    BUN 33 (H) 6 - 23 MG/DL    CREATININE 1.6 (H) 0.9 - 1.3 MG/DL    Glucose 127 (H) 70 - 99 MG/DL    Calcium 7.5 (L) 8.3 - 10.6 MG/DL    GFR Non- 42 (L) >60 mL/min/1.73m2    GFR  51 (L) >60 mL/min/1.73m2    Phosphorus 4.3 2.5 - 4.9 MG/DL    Magnesium 2.6 (H) 1.8 - 2.4 mg/dl Auto Differential    Collection Time: 12/04/20  6:00 AM   Result Value Ref Range    WBC 18.0 (H) 4.0 - 10.5 K/CU MM    RBC 2.84 (L) 4.6 - 6.2 M/CU MM    Hemoglobin 8.9 (L) 13.5 - 18.0 GM/DL    Hematocrit 27.6 (L) 42 - 52 %    MCV 97.2 78 - 100 FL    MCH 31.3 (H) 27 - 31 PG    MCHC 32.2 32.0 - 36.0 %    RDW 14.2 11.7 - 14.9 %    Platelets 61 (L) 731 - 440 K/CU MM    MPV 12.2 (H) 7.5 - 11.1 FL   Comprehensive Metabolic Panel w/ Reflex to MG    Collection Time: 12/04/20  6:00 AM   Result Value Ref Range    Sodium 133 (L) 135 - 145 MMOL/L    Potassium 4.4 3.5 - 5.1 MMOL/L    Chloride 99 99 - 110 mMol/L    CO2 23 21 - 32 MMOL/L    BUN 28 (H) 6 - 23 MG/DL    CREATININE 1.3 0.9 - 1.3 MG/DL    Glucose 110 (H) 70 - 99 MG/DL    Calcium 7.1 (L) 8.3 - 10.6 MG/DL    Alb 2.2 (L) 3.4 - 5.0 GM/DL    Total Protein 4.6 (L) 6.4 - 8.2 GM/DL    Total Bilirubin 0.4 0.0 - 1.0 MG/DL    ALT 52 (H) 10 - 40 U/L    AST 95 (H) 15 - 37 IU/L    Alkaline Phosphatase 131 (H) 40 - 128 IU/L    GFR Non- 54 (L) >60 mL/min/1.73m2    GFR African American >60 >60 mL/min/1.73m2    Anion Gap 11 4 - 16   Protime-INR    Collection Time: 12/04/20  6:00 AM   Result Value Ref Range    Protime 12.2 11.7 - 14.5 SECONDS    INR 1.01 INDEX   APTT    Collection Time: 12/04/20  6:00 AM   Result Value Ref Range    aPTT 123.1 (H) 25.1 - 37.1 SECONDS   Fibrinogen    Collection Time: 12/04/20  6:00 AM   Result Value Ref Range    Fibrinogen 203 196.9 - 442.1 MG/DL   D-Dimer, Quantitative    Collection Time: 12/04/20  6:00 AM   Result Value Ref Range    D-Dimer, Quant >5250 (H) <230 NG/mL(DDU)   C-Reactive Protein    Collection Time: 12/04/20  6:00 AM   Result Value Ref Range    CRP, High Sensitivity 136.4 mg/L   Triglyceride    Collection Time: 12/04/20  6:00 AM   Result Value Ref Range    Triglycerides 422 (H) <150 MG/DL   Blood gas, arterial    Collection Time: 12/04/20  8:00 AM   Result Value Ref Range    pH, Bld 7.33 (L) 7.34 - 7.45    pCO2, Arterial 42.0 32 - 45 MMHG    pO2, Arterial 80 75 - 100 MMHG    Base Excess 4 (H) 0 - 3.3    HCO3, Arterial 22.1 18 - 23 MMOL/L    CO2 Content 23.4 19 - 24 MMOL/L    O2 Sat 94.4 (L) 96 - 97 %    Carbon Monoxide, Blood 2.1 0 - 5 %    Methemoglobin, Arterial 1.4 <1.5 %    Comment AC24 P12 .70    Calcium, ionized    Collection Time: 12/04/20  8:42 AM   Result Value Ref Range    Ionized Ca 0.99 (L) 1.12 - 1.32 mMOL/L    Calcium, Ion 3.96 (L) 4.48 - 5.28 MG/DL   Critical Care Panel    Collection Time: 12/04/20  8:42 AM   Result Value Ref Range    Sodium 135 135 - 145 MMOL/L    Potassium 4.6 3.5 - 5.1 MMOL/L    Chloride 97 (L) 99 - 110 mMol/L    CO2 20 (L) 21 - 32 MMOL/L    Anion Gap 18 (H) 4 - 16    BUN 29 (H) 6 - 23 MG/DL    CREATININE 1.4 (H) 0.9 - 1.3 MG/DL    Glucose 111 (H) 70 - 99 MG/DL    Calcium 7.3 (L) 8.3 - 10.6 MG/DL    GFR Non- 49 (L) >60 mL/min/1.73m2    GFR  60 (L) >60 mL/min/1.73m2    Phosphorus 3.9 2.5 - 4.9 MG/DL    Magnesium 2.6 (H) 1.8 - 2.4 mg/dl     CULTURE results: Invalid input(s): BLOOD CULTURE,  URINE CULTURE, SURGICAL CULTURE    Diagnosis:  Patient Active Problem List   Diagnosis    KISHORE on CPAP    Acute respiratory failure with hypoxia (HCC)    MICHELLE (acute kidney injury) (HCC)    Electrolyte imbalance    Isolated proteinuria with minor glomerular abnormality    Volume depletion    Acute kidney injury (MICHELLE) with acute tubular necrosis (ATN) (HCC)    Acidosis    AIVR (accelerated idioventricular rhythm) ([facility])       Active Problems  Active Problems:    Acute respiratory failure with hypoxia (HCC)    MICHELLE (acute kidney injury) (HCC)    Electrolyte imbalance    Isolated proteinuria with minor glomerular abnormality    Volume depletion    Acute kidney injury (MICHELLE) with acute tubular necrosis (ATN) (HCC)    Acidosis    AIVR (accelerated idioventricular rhythm) ([facility])  Resolved Problems:    * No resolved hospital problems.  *      Impression and plan   Summary and rationale: Patient is a 76 y.o.   male critical COVID-19 pneumonia with acute kidney injury, transaminitis, Covid associated coagulopathy. On CRRT    Clinical status: leukocytosis is on a dwt, yet to improve. Shea Rey Therapeutic:  o Ongoing antibiotics: Meropenem 11/28-  o Antiviral agent: remdesivir 11/24-28  o Anti-inflammatory agents:  - Dexamethasone: 11/22-24  - Solu-Medrol: 11/25-12/1  - continue hydrocortisone o-12/1  o Completed antibiotics:   o Convalescent plasma receipt:  o Other agents:    Diagnostic: trend CRP and cpt   F/u: I   Other: Palliative care on consult.         Electronically signed by: Electronically signed by Madan Sheffield MD on 12/4/2020 at 10:43 AM

## 2020-12-04 NOTE — PROGRESS NOTES
Hospitalist Progress Note      Name:  Jone Germain /Age/Sex: 1945  (76 y.o. male)   MRN & CSN:  [id number] & [id number] Admission Date/Time: 2020  3:03 AM   Location:  -A PCP: Jakob Allen MD       Hospital Day:  [address] Course:   Jone Germain is a 76 y.o. male who presented with shortness of breath with pulse ox 84-92 on room air. Diagnosed with Covid earlier this week and has been becoming increasingly short of breath. Associated with anxiety and so he presented to the emergency room. Emergency room escalated oxygenation to Vapotherm. He was admitted to the medicine service. Patient was given IV steroids, IV antibiotics. 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 Remdesivir was initiated on  as he was on high flow and qualified; he completed the course x5 days.  he was requiring noninvasive ventilation however was not maintaining his oxygenation and was subsequently intubated. His renal function began to get worse and nephrology was consulted. He began CRRT on . his infection did not appear to be responding to cefepime and so his antibiotics were switched to Merrem and vancomycin; ID was consulted. Blood pressure needed support and so Levophed was initiated on  and vasopressin was eventually added on . His overall health status continued to decline. RotoProne treatment was initiated on . Assessment and Plan:      Acute respiratory failure with hypoxia secondary to COVID-19 viral pneumonia infection   Likely superimposed bacterial pneumonia   SIRS criteria met at time of admission secondary to sepsis from viral pneumonia  - COVID-19 positive: ,   - IV antibiotics, IV steroids initiated on   - Convalescent plasma given: X2 units on   - Remdesivir:   - Oxygen requirement today:   MICHELLE/ATN, metabolic acidosis and respiratory acidosis.   Prerenal   Hyponatremia and hypokalemia, IV replacement   Hypertension, essential    Called wife/adolfo today  She knows she does NOT want trach/peg  She will talk to their children about w/d of care; likely to occur within the next 7 days    Diet No diet orders on file   DVT Prophylaxis [] Lovenox, []  Heparin, [] SCDs, []No VTE prophylaxis, patient ambulating   GI Prophylaxis [] PPI, [] H2 Blocker, [] No GI prophylaxis, patient is receiving diet/Tube Feeds   Code Status Limited   Disposition Poor prognosis   MDM [] Low, [] Moderate,[x]  High  Patient's risk as above due to     [] One or more chronic illnesses with severe exacerbation or progression    [] Acute or chronic illnesses or injuries that pose a threat to life or bodily function    [] An abrupt change in neurological status    [] Decision not to resuscitate     [x] Drug therapy requiring intensive monitoring for toxicity - iv propofol     Subjective:     Pt S&E. No improvements  Worsening vent settings    No ROS as he is intubated and sedated. Objective: Intake/Output Summary (Last 24 hours) at 12/4/2020 0738  Last data filed at 12/4/2020 0703  Gross per 24 hour   Intake 2655.42 ml   Output 4053 ml   Net -1397.58 ml      Vitals:   Vitals:    12/04/20 0700   BP: (!) 141/59   Pulse: 50   Resp: 24   Temp:    SpO2: 100%     Physical Exam:    GEN sedated male, laying in bed in no apparent distress. EYES Pupils are equally round. No scleral erythema, discharge, or conjunctivitis. HENT Mucous membranes are moist. +ETT +NGT. +edematous face  NECK No apparent thyromegaly or masses. RESP +rales or rhonchi. Symmetric chest movement while on room air. CARDIO/VASC S1/S2 auscultated. Regular rate without appreciable murmurs, rubs, or gallops. Peripheral pulses equal bilaterally and palpable. +1 pitting edema in extremities   GI Abdomen is soft without significant tenderness, masses, or guarding. Bowel sounds are normoactive. Rectal exam deferred.     Joy catheter is present. MSK No gross joint deformities. No spontaneous movement noted  SKIN Normal coloration, warm, dry. NEURO Does not follow directions. No response to verbal or painful stimuli  PSYCH Sedated on propofol.  Fentanyl    In rotoprone     Medications:   Medications:    enoxaparin  30 mg Subcutaneous Daily    hydrocortisone sodium succinate PF  100 mg Intravenous Q8H    meropenem  1 g Intravenous Q8H    famotidine (PEPCID) injection  10 mg Intravenous BID    sodium chloride flush  10 mL Intravenous 2 times per day    albuterol sulfate HFA  4 puff Inhalation Q4H    And    ipratropium  4 puff Inhalation Q4H    chlorhexidine  15 mL Mouth/Throat BID    lidocaine PF  5 mL Intradermal See Admin Instructions    sodium chloride flush  10 mL Intravenous 2 times per day    sodium chloride flush  10 mL Intravenous 2 times per day    vitamin D  2,000 Units Oral Daily    atorvastatin  40 mg Oral Daily      Infusions:    heparin 5000 units CRRT syringe 0.5 mL/hr at 12/02/20 2100    prismaSATE BGK 4/2.5 250 mL/hr (12/03/20 1520)    prismaSATE BGK 4/2.5 2,000 mL/hr (12/04/20 0636)    prismaSATE BGK 4/2.5 1,000 mL/hr (12/04/20 0242)    vasopressin (Septic Shock) infusion 0.04 Units/min (12/04/20 0635)    norepinephrine 2 mcg/min (12/03/20 1341)    cisatracurium (NIMBEX) infusion 3 mcg/kg/min (12/04/20 0010)    fentanyl 200 mcg/hr (12/04/20 0422)    propofol 80 mcg/kg/min (12/04/20 0554)     PRN Meds: heparin (porcine), 2,000 Units, PRN  Artificial Tears, , Q4H PRN  sodium chloride, 2,000 mL, PRN  potassium chloride, 20 mEq, PRN  magnesium sulfate, 1 g, PRN  calcium gluconate, 1 g, PRN    Or  calcium gluconate, 2 g, PRN    Or  calcium gluconate, 3 g, PRN    Or  calcium gluconate, 4 g, PRN  sodium phosphate IVPB, 6 mmol, PRN    Or  sodium phosphate IVPB, 12 mmol, PRN    Or  sodium phosphate IVPB, 18 mmol, PRN    Or  sodium phosphate IVPB, 24 mmol, PRN  sodium chloride flush, 10 mL, PRN  hydrOXYzine, 50 mg, Q6H PRN  sodium chloride, 30 mL, PRN  sodium chloride flush, 10 mL, PRN  sodium chloride flush, 10 mL, PRN  acetaminophen, 650 mg, Q6H PRN    Or  acetaminophen, 650 mg, Q6H PRN  polyethylene glycol, 17 g, Daily PRN  promethazine, 12.5 mg, Q6H PRN    Or  ondansetron, 4 mg, Q6H PRN  guaiFENesin-dextromethorphan, 5 mL, Q4H PRN          Electronically signed by Bita Aguirre DO on 12/4/2020 at 7:38 AM

## 2020-12-04 NOTE — PROGRESS NOTES
Nephrology  Dialysis Note        [address] [facility] [address]  Phone: [phone]  Office Hours: 8:30AM - 4:30PM  Monday - Friday          PROCEDURE:  Patient seen during crrt      PHYSICIAN:  OLEG      INDICATION:  Acute tubular necrosis      RX:  See dialysis flowsheet for specifics on access, blood flow rate, dialysate baths, duration of dialysis, anticoagulation and other technical information.       COMMENTS:  CONTINUE CRRT WITH FLUID REMOVAL  DECREASE DIALYSATE DOSE  CRITICALLY ILL    Electronically signed by Sonny Johnson DO on 12/4/2020 at 9:35 AM    [address] Caden Chirinos DO [address] [address]  PHONE: [phone]  FAX: [phone]

## 2020-12-04 NOTE — PLAN OF CARE
and communicates pain  Description: Recognizes and communicates pain  Outcome: Ongoing  Goal: Control of acute pain  Description: Control of acute pain  Outcome: Ongoing  Goal: Control of chronic pain  Description: Control of chronic pain  Outcome: Ongoing     Problem: Serum Glucose Level - Abnormal:  Goal: Ability to maintain appropriate glucose levels will improve to within specified parameters  Description: Ability to maintain appropriate glucose levels will improve to within specified parameters  Outcome: Ongoing     Problem: Skin Integrity - Impaired:  Goal: Will show no infection signs and symptoms  Description: Will show no infection signs and symptoms  Outcome: Ongoing  Goal: Absence of new skin breakdown  Description: Absence of new skin breakdown  Outcome: Ongoing     Problem: Sleep Pattern Disturbance:  Goal: Appears well-rested  Description: Appears well-rested  Outcome: Ongoing     Problem: Tissue Perfusion, Altered:  Goal: Circulatory function within specified parameters  Description: Circulatory function within specified parameters  Outcome: Ongoing     Problem: Tissue Perfusion - Cardiopulmonary, Altered:  Goal: Absence of angina  Description: Absence of angina  Outcome: Ongoing  Goal: Hemodynamic stability will improve  Description: Hemodynamic stability will improve  Outcome: Ongoing

## 2020-12-04 NOTE — PROGRESS NOTES
Hospitalist Progress Note      Name:  Esha Cheng /Age/Sex: 1945  (76 y.o. male)   MRN & CSN:  [id number] & [id number] Admission Date/Time: 2020  3:03 AM   Location:  -A PCP: Latonia Dominguez MD       Hospital Day:  [address] Course:   Esha Cheng is a 76 y.o. male who presented with shortness of breath with pulse ox 84-92 on room air. Diagnosed with Covid earlier this week and has been becoming increasingly short of breath. Associated with anxiety and so he presented to the emergency room. Emergency room escalated oxygenation to Vapotherm. He was admitted to the medicine service. Patient was given IV steroids, IV antibiotics. 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 Remdesivir was initiated on  as he was on high flow and qualified; he completed the course x5 days.  he was requiring noninvasive ventilation however was not maintaining his oxygenation and was subsequently intubated. His renal function began to get worse and nephrology was consulted. He began CRRT on . his infection did not appear to be responding to cefepime and so his antibiotics were switched to Merrem and vancomycin; ID was consulted. Blood pressure needed support and so Levophed was initiated on  and vasopressin was eventually added on . His overall health status continued to decline. RotoProne treatment was initiated on . Assessment and Plan:      Acute respiratory failure with hypoxia secondary to COVID-19 viral pneumonia infection   Likely superimposed bacterial pneumonia   SIRS criteria met at time of admission secondary to sepsis from viral pneumonia  - COVID-19 positive: ,   - IV antibiotics, IV steroids initiated on   - Convalescent plasma given: X2 units on   - Remdesivir:   - Oxygen requirement today:   MICHELLE/ATN, metabolic acidosis and respiratory acidosis.   Prerenal   Hyponatremia and hypokalemia, IV replacement   Hypertension, essential    Continue to make vent changes as necessary  Will call attempt to call sheela/shelia    Diet No diet orders on file   DVT Prophylaxis [] Lovenox, []  Heparin, [] SCDs, []No VTE prophylaxis, patient ambulating   GI Prophylaxis [] PPI, [] H2 Blocker, [] No GI prophylaxis, patient is receiving diet/Tube Feeds   Code Status Limited   Disposition Poor prognosis   MDM [] Low, [] Moderate,[x]  High  Patient's risk as above due to     [] One or more chronic illnesses with severe exacerbation or progression    [] Acute or chronic illnesses or injuries that pose a threat to life or bodily function    [] An abrupt change in neurological status    [] Decision not to resuscitate     [x] Drug therapy requiring intensive monitoring for toxicity - iv propofol     Subjective:     Pt S&E. No reported problems from overnight. No ROS as he is intubated and sedated. Objective: Intake/Output Summary (Last 24 hours) at 12/4/2020 0741  Last data filed at 12/4/2020 0703  Gross per 24 hour   Intake 2655.42 ml   Output 4053 ml   Net -1397.58 ml      Vitals:   Vitals:    12/04/20 0700   BP: (!) 141/59   Pulse: 50   Resp: 24   Temp:    SpO2: 100%     Physical Exam:    GEN sedated male, laying in bed in no apparent distress. EYES Pupils are equally round. No scleral erythema, discharge, or conjunctivitis. HENT Mucous membranes are moist. +ETT +NGT. +edematous face  NECK No apparent thyromegaly or masses. RESP +rales or rhonchi. Symmetric chest movement while on room air. CARDIO/VASC S1/S2 auscultated. Regular rate without appreciable murmurs, rubs, or gallops. Peripheral pulses equal bilaterally and palpable. +1 pitting edema in extremities   GI Abdomen is soft without significant tenderness, masses, or guarding. Bowel sounds are normoactive. Rectal exam deferred.  Joy catheter is present. MSK No gross joint deformities.  No spontaneous movement noted  SKIN Normal coloration, warm, dry. NEURO Does not follow directions. No response to verbal or painful stimuli  PSYCH Sedated on propofol.  Fentanyl    In rotoprone     Medications:   Medications:    enoxaparin  30 mg Subcutaneous Daily    hydrocortisone sodium succinate PF  100 mg Intravenous Q8H    meropenem  1 g Intravenous Q8H    famotidine (PEPCID) injection  10 mg Intravenous BID    sodium chloride flush  10 mL Intravenous 2 times per day    albuterol sulfate HFA  4 puff Inhalation Q4H    And    ipratropium  4 puff Inhalation Q4H    chlorhexidine  15 mL Mouth/Throat BID    lidocaine PF  5 mL Intradermal See Admin Instructions    sodium chloride flush  10 mL Intravenous 2 times per day    sodium chloride flush  10 mL Intravenous 2 times per day    vitamin D  2,000 Units Oral Daily    atorvastatin  40 mg Oral Daily      Infusions:    heparin 5000 units CRRT syringe 0.5 mL/hr at 12/02/20 2100    prismaSATE BGK 4/2.5 250 mL/hr (12/03/20 1520)    prismaSATE BGK 4/2.5 2,000 mL/hr (12/04/20 0636)    prismaSATE BGK 4/2.5 1,000 mL/hr (12/04/20 0242)    vasopressin (Septic Shock) infusion 0.04 Units/min (12/04/20 0635)    norepinephrine 2 mcg/min (12/03/20 1341)    cisatracurium (NIMBEX) infusion 3 mcg/kg/min (12/04/20 0010)    fentanyl 200 mcg/hr (12/04/20 0422)    propofol 80 mcg/kg/min (12/04/20 0554)     PRN Meds: heparin (porcine), 2,000 Units, PRN  Artificial Tears, , Q4H PRN  sodium chloride, 2,000 mL, PRN  potassium chloride, 20 mEq, PRN  magnesium sulfate, 1 g, PRN  calcium gluconate, 1 g, PRN    Or  calcium gluconate, 2 g, PRN    Or  calcium gluconate, 3 g, PRN    Or  calcium gluconate, 4 g, PRN  sodium phosphate IVPB, 6 mmol, PRN    Or  sodium phosphate IVPB, 12 mmol, PRN    Or  sodium phosphate IVPB, 18 mmol, PRN    Or  sodium phosphate IVPB, 24 mmol, PRN  sodium chloride flush, 10 mL, PRN  hydrOXYzine, 50 mg, Q6H PRN  sodium chloride, 30 mL, PRN  sodium chloride flush, 10 mL, PRN  sodium chloride flush, 10 mL, PRN  acetaminophen, 650 mg, Q6H PRN    Or  acetaminophen, 650 mg, Q6H PRN  polyethylene glycol, 17 g, Daily PRN  promethazine, 12.5 mg, Q6H PRN    Or  ondansetron, 4 mg, Q6H PRN  guaiFENesin-dextromethorphan, 5 mL, Q4H PRN          Electronically signed by Carter Najjar, DO on 12/4/2020 at 7:41 AM

## 2020-12-05 NOTE — PLAN OF CARE
Problem: Airway Clearance - Ineffective  Goal: Achieve or maintain patent airway  Outcome: Ongoing     Problem: Gas Exchange - Impaired  Goal: Absence of hypoxia  Outcome: Ongoing  Goal: Promote optimal lung function  Outcome: Ongoing     Problem: Breathing Pattern - Ineffective  Goal: Ability to achieve and maintain a regular respiratory rate  Outcome: Ongoing     Problem:  Body Temperature -  Risk of, Imbalanced  Goal: Ability to maintain a body temperature within defined limits  Outcome: Ongoing  Goal: Will regain or maintain usual level of consciousness  Outcome: Ongoing  Goal: Complications related to the disease process, condition or treatment will be avoided or minimized  Outcome: Ongoing     Problem: Isolation Precautions - Risk of Spread of Infection  Goal: Prevent transmission of infection  Outcome: Ongoing     Problem: Nutrition Deficits  Goal: Optimize nutrtional status  Outcome: Ongoing     Problem: Risk for Fluid Volume Deficit  Goal: Maintain normal heart rhythm  Outcome: Ongoing  Goal: Maintain absence of muscle cramping  Outcome: Ongoing  Goal: Maintain normal serum potassium, sodium, calcium, phosphorus, and pH  Outcome: Ongoing     Problem: Loneliness or Risk for Loneliness  Goal: Demonstrate positive use of time alone when socialization is not possible  Outcome: Ongoing     Problem: Fatigue  Goal: Verbalize increase energy and improved vitality  Outcome: Ongoing     Problem: Patient Education: Go to Patient Education Activity  Goal: Patient/Family Education  Outcome: Ongoing     Problem: Falls - Risk of:  Goal: Will remain free from falls  Description: Will remain free from falls  Outcome: Ongoing  Goal: Absence of physical injury  Description: Absence of physical injury  Outcome: Ongoing     Problem: Skin Integrity:  Goal: Will show no infection signs and symptoms  Description: Will show no infection signs and symptoms  Outcome: Ongoing  Goal: Absence of new skin breakdown  Description: Absence

## 2020-12-05 NOTE — PROGRESS NOTES
4.6 3.5 - 5.1 MMOL/L    Chloride 97 (L) 99 - 110 mMol/L    CO2 20 (L) 21 - 32 MMOL/L    Anion Gap 18 (H) 4 - 16    BUN 29 (H) 6 - 23 MG/DL    CREATININE 1.4 (H) 0.9 - 1.3 MG/DL    Glucose 111 (H) 70 - 99 MG/DL    Calcium 7.3 (L) 8.3 - 10.6 MG/DL    GFR Non- 49 (L) >60 mL/min/1.73m2    GFR  60 (L) >60 mL/min/1.73m2    Phosphorus 3.9 2.5 - 4.9 MG/DL    Magnesium 2.6 (H) 1.8 - 2.4 mg/dl   Calcium, ionized    Collection Time: 12/04/20  2:10 PM   Result Value Ref Range    Ionized Ca 1.02 (L) 1.12 - 1.32 mMOL/L    Calcium, Ion 4.08 (L) 4.48 - 5.28 MG/DL   Critical Care Panel    Collection Time: 12/04/20  2:10 PM   Result Value Ref Range    Sodium 131 (L) 135 - 145 MMOL/L    Potassium 4.2 3.5 - 5.1 MMOL/L    Chloride 96 (L) 99 - 110 mMol/L    CO2 22 21 - 32 MMOL/L    Anion Gap 13 4 - 16    BUN 32 (H) 6 - 23 MG/DL    CREATININE 1.6 (H) 0.9 - 1.3 MG/DL    Glucose 84 70 - 99 MG/DL    Calcium 7.4 (L) 8.3 - 10.6 MG/DL    GFR Non- 42 (L) >60 mL/min/1.73m2    GFR  51 (L) >60 mL/min/1.73m2    Phosphorus 4.1 2.5 - 4.9 MG/DL    Magnesium 2.6 (H) 1.8 - 2.4 mg/dl   Blood gas, arterial    Collection Time: 12/04/20  8:00 PM   Result Value Ref Range    pH, Bld 7.40 7.34 - 7.45    pCO2, Arterial 35.0 32 - 45 MMHG    pO2, Arterial 85 75 - 100 MMHG    Base Excess 3 0 - 3.3    HCO3, Arterial 21.7 18 - 23 MMOL/L    CO2 Content 22.8 19 - 24 MMOL/L    O2 Sat 95.9 (L) 96 - 97 %    Carbon Monoxide, Blood 1.9 0 - 5 %    Methemoglobin, Arterial 1.3 <1.5 %    Comment PC26 RR24 .70 P10    Calcium, ionized    Collection Time: 12/04/20  8:00 PM   Result Value Ref Range    Ionized Ca 1.00 (L) 1.12 - 1.32 mMOL/L    Calcium, Ion 4.00 (L) 4.48 - 5.28 MG/DL   SPECIMEN REJECTION    Collection Time: 12/04/20  8:00 PM   Result Value Ref Range    Rejected Test [facility]PRO     Reason for Rejection UNABLE TO PERFORM TESTING:    Critical Care Panel    Collection Time: 12/04/20  9:16 PM   Result Value Ref AM

## 2020-12-05 NOTE — PROGRESS NOTES
Nephrology Progress Note        [address], [address] [address]  Phone: [phone]  Office Hours: 8:30AM - 4:30PM  Monday - Friday 12/5/2020 10:48 AM  Subjective:   Admit Date: 11/23/2020  PCP: John Patel MD  Interval History: supine today  Anuric      Diet: No diet orders on file      Data:   Scheduled Meds:   enoxaparin  30 mg Subcutaneous Daily    hydrocortisone sodium succinate PF  100 mg Intravenous Q8H    meropenem  1 g Intravenous Q8H    famotidine (PEPCID) injection  10 mg Intravenous BID    sodium chloride flush  10 mL Intravenous 2 times per day    albuterol sulfate HFA  4 puff Inhalation Q4H    And    ipratropium  4 puff Inhalation Q4H    chlorhexidine  15 mL Mouth/Throat BID    lidocaine PF  5 mL Intradermal See Admin Instructions    sodium chloride flush  10 mL Intravenous 2 times per day    sodium chloride flush  10 mL Intravenous 2 times per day    vitamin D  2,000 Units Oral Daily    atorvastatin  40 mg Oral Daily     Continuous Infusions:   heparin 5000 units CRRT syringe 0.5 mL/hr at 12/04/20 2000    prismaSATE BGK 4/2.5 Stopped (12/05/20 0130)    prismaSATE BGK 4/2.5 Stopped (12/05/20 0130)    prismaSATE BGK 4/2.5 Stopped (12/05/20 0130)    vasopressin (Septic Shock) infusion 0.04 Units/min (12/05/20 0919)    norepinephrine 5 mcg/min (12/05/20 1031)    cisatracurium (NIMBEX) infusion Stopped (12/05/20 1008)    fentanyl 200 mcg/hr (12/05/20 0848)    propofol 60 mcg/kg/min (12/05/20 1046)     PRN Meds:heparin (porcine), Artificial Tears, sodium chloride, potassium chloride, magnesium sulfate, calcium gluconate **OR** calcium gluconate **OR** calcium gluconate **OR** calcium gluconate, sodium phosphate IVPB **OR** sodium phosphate IVPB **OR** sodium phosphate IVPB **OR** sodium phosphate IVPB, sodium chloride flush, hydrOXYzine, sodium chloride, sodium chloride flush, sodium chloride flush, acetaminophen **OR** acetaminophen, polyethylene glycol, promethazine **OR** ondansetron, guaiFENesin-dextromethorphan  I/O last 3 completed shifts: In: 2672.3 [I.V.:1996. 1; NG/GT:60; IV Piggyback:616.1]  Out: 3313 [Urine:59; Emesis/NG output:115]  No intake/output data recorded. Intake/Output Summary (Last 24 hours) at 12/5/2020 1048  Last data filed at 12/5/2020 7312  Gross per 24 hour   Intake 2612.25 ml   Output 2509 ml   Net 103.25 ml       CBC:   Recent Labs     12/03/20  0700 12/04/20  0600 12/05/20  0600   WBC 18.5* 18.0* 18.5*   HGB 10.0* 8.9* 9.2*   * 61* 62*       BMP:    Recent Labs     12/04/20  1410 12/04/20  2116 12/05/20  0600   * 128* 130*   K 4.2 4.2 4.3   CL 96* 95* 94*   CO2 22 21 19*   BUN 32* 31* 39*   CREATININE 1.6* 1.5* 1.8*   GLUCOSE 84 103* 213*     Hepatic:   Recent Labs     12/03/20  0700 12/04/20  0600 12/05/20  0600   * 95* 69*   ALT 55* 52* 44*   BILITOT 0.4 0.4 0.5   ALKPHOS 156* 131* 142*     Troponin: No results for input(s): TROPONINI in the last 72 hours. BNP: No results for input(s): BNP in the last 72 hours. Lipids: No results for input(s): CHOL, HDL in the last 72 hours.     Invalid input(s): LDLCALCU  ABGs:   Lab Results   Component Value Date    PO2ART 56 12/05/2020    WZG4GRH 35.0 12/05/2020     INR:   Recent Labs     12/03/20  0700 12/04/20  0600 12/05/20  0600   INR 0.94 1.01 0.98       Objective:   Vitals: BP (!) 80/43   Pulse 69   Temp 97.9 °F (36.6 °C) (Rectal)   Resp 24   Ht 5' 11\" (1.803 m)   Wt 221 lb 12.5 oz (100.6 kg)   SpO2 90%   BMI 30.93 kg/m²   General appearance: , in no acute distress  HEENT: normocephalic, atraumatic,   Neck: supple, trachea midline  Lungs:  breathing comfortably on mv  Heart[de-identified] regular rate and rhythm,   Abdomen:  non distended,   Extremities: bue edema  Neurologic: sedated    Assessment and Plan:     Patient Active Problem List   Diagnosis    KISHORE on CPAP    Acute respiratory failure with hypoxia (HCC)    [facility]HELLE [facility]) [facility] [address]    Electrolyte imbalance    Isolated proteinuria with minor glomerular abnormality    Volume depletion    Acute kidney injury (MICHELLE) with acute tubular necrosis (ATN) (HCC)    Acidosis    AIVR (accelerated idioventricular rhythm) ([facility] [address]   MICHELLE from ATN;crrt from 11/30 to 12/5  Acute hypoxic resp failure from covid 19    Plan:  -A break from crrt for now, he is clotting the sets frequently despite heparin and losing blood since it does not get to be returned to his system  -Will likely resume crrt on monday  -critically ill            Electronically signed by Constanza Mcguire DO on 12/5/2020 at 10:48 AM    [address],  [facility] [address]  PHONE: [phone]  FAX: [phone]

## 2020-12-05 NOTE — PROGRESS NOTES
pulmonary      SUBJECTIVE: intubated on vent     OBJECTIVE    VITALS:  BP (!) 80/43   Pulse 69   Temp 98.1 °F (36.7 °C) (Rectal)   Resp 24   Ht 5' 11\" (1.803 m)   Wt 221 lb 12.5 oz (100.6 kg)   SpO2 90%   BMI 30.93 kg/m²   HEAD AND FACE EXAM:  No throat injection, no active exudate,no thrush  NECK EXAM;No JVD, no masses, symmetrical  CHEST EXAM; Expansion equal and symmetrical, no masses  LUNG EXAM; Good breath sounds bilaterally. There are expiratory wheezes both lungs, there are crackles at both lung bases  CARDIOVASCULAR EXAM: Positive S1 and S2, no S3 or S4, no clicks ,no murmurs  RIGHT AND LEFT LOWER EXTRIMITY EXAM: No edema, no swelling, no inflamation            LABS   Lab Results   Component Value Date    WBC 18.5 (H) 12/05/2020    HGB 9.2 (L) 12/05/2020    HCT 28.7 (L) 12/05/2020    MCV 96.6 12/05/2020    PLT 62 (L) 12/05/2020     Lab Results   Component Value Date    CREATININE 1.8 (H) 12/05/2020    BUN 39 (H) 12/05/2020     (L) 12/05/2020    K 4.3 12/05/2020    CL 94 (L) 12/05/2020    CO2 19 (L) 12/05/2020     Lab Results   Component Value Date    INR 0.98 12/05/2020    PROTIME 11.8 12/05/2020          Lab Results   Component Value Date    PHOS 2.5 12/04/2020    PHOS 4.1 12/04/2020    PHOS 3.9 12/04/2020        Recent Labs     12/04/20  0800 12/04/20  2000 12/05/20  0800   PH 7.33* 7.40 7.36   PO2ART 80 85 56*   DDX1NQY 42.0 35.0 35.0   O2SAT 94.4* 95.9* 88.4*         Wt Readings from Last 3 Encounters:   11/22/20 215 lb (97.5 kg)   11/18/20 215 lb (97.5 kg)   06/19/17 225 lb (102.1 kg)               ASSESMENT  Ac resp failure  covid pneumon ia  florentino pneumonia        PLAN  1. cpm  2. Cont full vent supoport  3. Dc rotoprone  4.  Wean protocol later    12/5/2020  Heather Parson M.D.

## 2020-12-05 NOTE — PROGRESS NOTES
During bath, machine alarming, high TMP pressures, filter clotting. No clots noted in filter. Treatment ended and blood returned. Both ports heparin locked per orders. Dressing changed to HD CVC. Patient tolerated Supine, reverse trendelenburg for 2 hours. Patient placed back in prone for x-ray.

## 2020-12-05 NOTE — PROGRESS NOTES
Unable to draw from left radial art line, good wave form, flushes easily. AM labs drawn from R PICC.

## 2020-12-05 NOTE — FLOWSHEET NOTE
Pt off of rotoprone bed per order at 1220, currently on 50% fi02 10 PEEP, oxygen sat is 925% breathing 4 breaths over the vent, BP stable, family updated. Will monitor closely.

## 2020-12-05 NOTE — PROGRESS NOTES
Pharmacy Consult to renal dose medications:    RENAL LAB EVALUATION  Estimated Creatinine Clearance: 41 mL/min (A) (based on SCr of 1.9 mg/dL (H)).   Recent Labs     12/04/20 2116 12/05/20  0600 12/05/20  0843   BUN 31* 39* 43*   CREATININE 1.5* 1.8* 1.9*     CRRT on hold 2/2 frequent clotting filters; likely to resume on Monday    Dosing Plan:  Reduce Meropenem 1g IVPB q12h per CrCl estimate  Continue Famotidine 10 mg IV BID    All other medications are dosed properly at this time    Gera Lambert, ErumD, [facility]

## 2020-12-05 NOTE — FLOWSHEET NOTE
Pt has been supine for 3 hours at this time, tolerating very well, oxygen 90-91%. Dr. Eliazar Ham and Dr. Lise Felix rounding, order to turn nimbex off, and if pt is still doing well supine at 1100 pt can come off of rotoprone bed. This nurse will call daughter and wife to update. Will monitor closely.

## 2020-12-06 NOTE — PROGRESS NOTES
At bedside    Patient acidotic    Trying to start CRRT but vascath is not functional.  Ordering cathflow and IR to come in and replace line. IVP bicarb  Bicarb gtt ordered as well  Nephro ordered crrt to start and will be initiated as soon as vascath is functioning. Family previously were discussing w/d of care over the last few days with a hesitation of that plan yesterday. Juan Miguel Moulton hasn't been used since they changed the goals of care to include w/d care. Dr. Holland Lowe.  Janae Akins  Internal Medicine Hospitalist  [facility]  12/6/2020 11:14 AM

## 2020-12-06 NOTE — PROGRESS NOTES
Nephrology Progress Note        [address], [address] [address]  Phone: [phone]  Office Hours: 8:30AM - 4:30PM  Monday - Friday 12/6/2020 10:31 AM  Subjective:   Admit Date: 11/23/2020  PCP: Marcella Chowdhury MD  Interval History: about 120ml urine so far since the am dose of bumex  Remains on peep of 10 but  Supine now  Still on 2 pressors  Venous line of HD cath is dysfunctional    Diet: No diet orders on file      Data:   Scheduled Meds:   alteplase  2 mg Intracatheter Once    bumetanide  2 mg Intravenous Q6H    meropenem  1 g Intravenous Q12H    enoxaparin  30 mg Subcutaneous Daily    hydrocortisone sodium succinate PF  100 mg Intravenous Q8H    famotidine (PEPCID) injection  10 mg Intravenous BID    sodium chloride flush  10 mL Intravenous 2 times per day    albuterol sulfate HFA  4 puff Inhalation Q4H    And    ipratropium  4 puff Inhalation Q4H    chlorhexidine  15 mL Mouth/Throat BID    lidocaine PF  5 mL Intradermal See Admin Instructions    sodium chloride flush  10 mL Intravenous 2 times per day    sodium chloride flush  10 mL Intravenous 2 times per day    vitamin D  2,000 Units Oral Daily    atorvastatin  40 mg Oral Daily     Continuous Infusions:   vasopressin (Septic Shock) infusion 0.04 Units/min (12/06/20 0745)    heparin 5000 units CRRT syringe 0.5 mL/hr at 12/04/20 2000    prismaSATE BGK 4/2.5 Stopped (12/05/20 0130)    prismaSATE BGK 4/2.5 Stopped (12/05/20 0130)    prismaSATE BGK 4/2.5 Stopped (12/05/20 0130)    norepinephrine 3 mcg/min (12/05/20 1352)    fentanyl 125 mcg/hr (12/06/20 1020)    propofol 80 mcg/kg/min (12/06/20 1020)     PRN Meds:heparin (porcine), Artificial Tears, sodium chloride, potassium chloride, magnesium sulfate, calcium gluconate **OR** calcium gluconate **OR** calcium gluconate **OR** calcium gluconate, sodium phosphate IVPB **OR** sodium phosphate IVPB **OR** sodium phosphate IVPB **OR** sodium phosphate IVPB, sodium chloride flush, hydrOXYzine, sodium chloride, sodium chloride flush, sodium chloride flush, acetaminophen **OR** acetaminophen, polyethylene glycol, promethazine **OR** ondansetron, guaiFENesin-dextromethorphan  I/O last 3 completed shifts: In: 2129 [I.V.:1778; NG/GT:60; IV Piggyback:291]  Out: 510 [Urine:320; Emesis/NG output:150; Stool:40]  I/O this shift:  In: 30 [NG/GT:30]  Out: 60 [Urine:60]    Intake/Output Summary (Last 24 hours) at 12/6/2020 1031  Last data filed at 12/6/2020 0744  Gross per 24 hour   Intake 2159 ml   Output 570 ml   Net 1589 ml       CBC:   Recent Labs     12/04/20  0600 12/05/20  0600 12/06/20  0550   WBC 18.0* 18.5* 18.8*   HGB 8.9* 9.2* 9.0*   PLT 61* 62* 88*       BMP:    Recent Labs     12/05/20  0600 12/05/20  0843 12/06/20  0550   * 131* 129*   K 4.3 4.3 4.7   CL 94* 95* 93*   CO2 19* 18* 18*   BUN 39* 43* 73*   CREATININE 1.8* 1.9* 3.0*   GLUCOSE 213* 155* 94     Hepatic:   Recent Labs     12/04/20  0600 12/05/20  0600 12/06/20  0550   AST 95* 69* 64*   ALT 52* 44* 38   BILITOT 0.4 0.5 0.4   ALKPHOS 131* 142* 140*     Troponin: No results for input(s): TROPONINI in the last 72 hours. BNP: No results for input(s): BNP in the last 72 hours. Lipids: No results for input(s): CHOL, HDL in the last 72 hours.     Invalid input(s): LDLCALCU  ABGs:   Lab Results   Component Value Date    PO2ART 125 12/06/2020    CLM4JKL 39.0 12/06/2020     INR:   Recent Labs     12/04/20  0600 12/05/20  0600 12/06/20  0550   INR 1.01 0.98 0.95       Objective:   Vitals: BP (!) 143/65   Pulse 98   Temp 99 °F (37.2 °C) (Rectal) Comment: warming blanket off  Resp 24   Ht 5' 11\" (1.803 m)   Wt 233 lb 14.5 oz (106.1 kg)   SpO2 94%   BMI 32.62 kg/m²   General appearance:  in no acute distress  HEENT: normocephalic, atraumatic,   Neck: supple, trachea midline  Lungs: breathing comfortably on mv  Heart[de-identified] regular rate and rhythm,  Abdomen: soft, non-tender; non distended,   Extremities: bue edema  Neurologic: sedated    Assessment and Plan:     Patient Active Problem List   Diagnosis    KISHORE on CPAP    Acute respiratory failure with hypoxia (HCC)    MICHELLE (acute kidney injury) ([address]    Electrolyte imbalance    Isolated proteinuria with minor glomerular abnormality    Volume depletion    Acute kidney injury (MICEHLLE) with acute tubular necrosis (ATN) (HCC)    Acidosis    AIVR (accelerated idioventricular rhythm) ([facility] [address]   MICHELLE from ATN;crrt from 11/30 to 12/5  Acute hypoxic resp failure from covid 19  Metabolic acidosis     Plan:  -becoming acidotic and hyponatremic, has no renal tubular function  -resume crrt today if the hd cath function after a tpa dwell, otherwise, will need a new line tomorrow before resuming   -bumex 2mg iv every 6hrs for 4doses  -critically ill                    Electronically signed by Roland Gomez DO on 12/6/2020 at 10:31 AM    [address],  [address]  [place], [place] [address]  PHONE: [phone]  FAX: [phone]

## 2020-12-06 NOTE — PLAN OF CARE
Problem: Airway Clearance - Ineffective  Goal: Achieve or maintain patent airway  12/5/2020 2123 by Cindy Fletcher RN  Outcome: Ongoing  12/5/2020 0738 by Trent Torres RN  Outcome: Ongoing

## 2020-12-06 NOTE — FLOWSHEET NOTE
Patient starting to high pressure the vent with elevated resp rate. Rate 32. Patient elevated blood pressure 161/75, Patient not responsive, patient unable to open his eyes, patient not following commands, No response to deep pain to upper or lower extremities. Patient sedation turned back on but lowered Fentanyl gtt due to 50 min to show any response.  Will monitor

## 2020-12-06 NOTE — FLOWSHEET NOTE
Dr. Jonathan Brennan at bedside, CRRT filter clotted off after less than hour running, see orders (message to Dr. Sruthi Guajardo to inform, no response at this time).

## 2020-12-06 NOTE — PROGRESS NOTES
pulmonary      SUBJECTIVE:  Intubated on vent     OBJECTIVE    VITALS:  BP (!) 85/50   Pulse 73   Temp 97.3 °F (36.3 °C) (Rectal)   Resp 24   Ht 5' 11\" (1.803 m)   Wt 233 lb 14.5 oz (106.1 kg)   SpO2 92%   BMI 32.62 kg/m²   HEAD AND FACE EXAM:  No throat injection, no active exudate,no thrush  NECK EXAM;No JVD, no masses, symmetrical  CHEST EXAM; Expansion equal and symmetrical, no masses  LUNG EXAM; Good breath sounds bilaterally. There are expiratory wheezes both lungs, there are crackles at both lung bases  CARDIOVASCULAR EXAM: Positive S1 and S2, no S3 or S4, no clicks ,no murmurs  RIGHT AND LEFT LOWER EXTRIMITY EXAM: No edema, no swelling, no inflamation            LABS   Lab Results   Component Value Date    WBC 18.8 (H) 12/06/2020    HGB 9.0 (L) 12/06/2020    HCT 27.3 (L) 12/06/2020    MCV 98.2 12/06/2020    PLT 88 (L) 12/06/2020     Lab Results   Component Value Date    CREATININE 3.0 (H) 12/06/2020    BUN 73 (H) 12/06/2020     (L) 12/06/2020    K 4.7 12/06/2020    CL 93 (L) 12/06/2020    CO2 18 (L) 12/06/2020     Lab Results   Component Value Date    INR 0.95 12/06/2020    PROTIME 11.5 (L) 12/06/2020          Lab Results   Component Value Date    PHOS 3.7 12/05/2020    PHOS 2.5 12/04/2020    PHOS 4.1 12/04/2020        Recent Labs     12/04/20  2000 12/05/20  0800 12/06/20  0600   PH 7.40 7.36 7.26*   PO2ART 85 56* 125*   GBL1DXC 35.0 35.0 39.0   O2SAT 95.9* 88.4* 95.8*         Wt Readings from Last 3 Encounters:   12/06/20 233 lb 14.5 oz (106.1 kg)   11/22/20 215 lb (97.5 kg)   11/18/20 215 lb (97.5 kg)               ASSESMENT  Ac resp failure  covid pneumonia  florentino pneumonia        PLAN  1. cpm  2. Cont full vent support  3.  Dr Dina Cifuentes note read    12/6/2020  Chyna Jaeger M.D.

## 2020-12-07 NOTE — PROGRESS NOTES
Hospitalist Progress Note      Name:  Keron Mills /Age/Sex: 1945  (76 y.o. male)   MRN & CSN:  [id number] & [id number] Admission Date/Time: 2020  3:03 AM   Location:  -A PCP: Carmita Nation MD       Hospital Day:  Course:   Keron Mills is a 76 y.o. male who presented with shortness of breath with pulse ox 84-92 on room air. Diagnosed with Covid earlier this week and has been becoming increasingly short of breath. Associated with anxiety and so he presented to the emergency room. Emergency room escalated oxygenation to Vapotherm. He was admitted to the medicine service. Patient was given IV steroids, IV antibiotics. 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 Remdesivir was initiated on  as he was on high flow and qualified; he completed the course x5 days.  he was requiring noninvasive ventilation however was not maintaining his oxygenation and was subsequently intubated. His renal function began to get worse and nephrology was consulted. He began CRRT on . his infection did not appear to be responding to cefepime and so his antibiotics were switched to Merrem and vancomycin; ID was consulted. Blood pressure needed support and so Levophed was initiated on  and vasopressin was eventually added on . His overall health status continued to decline. RotoProne treatment was initiated on  and was stopped on . Family revisited trach/peg placement and wanted CRRT restarted on .     Assessment and Plan:      Acute respiratory failure with hypoxia secondary to COVID-19 viral pneumonia infection   Likely superimposed bacterial pneumonia   SIRS criteria met at time of admission secondary to sepsis from viral pneumonia  - COVID-19 positive: ,   - IV antibiotics, IV steroids initiated on   - Convalescent plasma given: X2 units on   - Remdesivir:   - Oxygen requirement today:   MICHELLE/ATN, metabolic acidosis and respiratory acidosis. Prerenal   Hyponatremia and hypokalemia, IV replacement   RLE DVT. Concern for LLE DVT on bedside US on 12/6   Hypertension, essential    IR called in to replace vascath because it was clotting off. He noted DVT is present in left groin. New vascath stopped functioning after an hr. An increase in heparin helped it function properly. Diet No diet orders on file   DVT Prophylaxis [] Lovenox, []  Heparin, [] SCDs, []No VTE prophylaxis, patient ambulating   GI Prophylaxis [] PPI, [] H2 Blocker, [] No GI prophylaxis, patient is receiving diet/Tube Feeds   Code Status Limited   Disposition Poor prognosis   MDM [] Low, [] Moderate,[x]  High  Patient's risk as above due to     [] One or more chronic illnesses with severe exacerbation or progression    [] Acute or chronic illnesses or injuries that pose a threat to life or bodily function    [] An abrupt change in neurological status    [] Decision not to resuscitate     [x] Drug therapy requiring intensive monitoring for toxicity - iv propofol     Subjective:     Pt S&E. Some problems with clotting   No improvements noted from overnight     No ROS as he is intubated and sedated. Objective: Intake/Output Summary (Last 24 hours) at 12/7/2020 0811  Last data filed at 12/7/2020 0602  Gross per 24 hour   Intake 1583 ml   Output 4008 ml   Net -2425 ml      Vitals:   Vitals:    12/07/20 0731   BP:    Pulse:    Resp:    Temp:    SpO2: 95%     Physical Exam:    GEN sedated male, laying in bed in no apparent distress. EYES Pupils are equally round. No scleral erythema, discharge, or conjunctivitis. HENT Mucous membranes are moist. +ETT +NGT. +edematous face  NECK No apparent thyromegaly or masses. RESP +rales or rhonchi. Symmetric chest movement while on room air. CARDIO/VASC S1/S2 auscultated. Regular rate without appreciable murmurs, rubs, or gallops.  Peripheral pulses equal bilaterally and palpable. +2 pitting edema in extremities   GI Abdomen is soft without significant tenderness, masses, or guarding. Bowel sounds are normoactive. Rectal exam deferred.  Joy catheter is present. MSK No gross joint deformities. No spontaneous movement noted  SKIN Normal coloration, warm, dry. NEURO Does not follow directions. No response to verbal or painful stimuli  PSYCH Sedated on propofol.  Fentanyl  Supine in regular medical bed    Medications:   Medications:    insulin lispro  0-18 Units Subcutaneous Q4H    meropenem  1 g Intravenous Q12H    enoxaparin  30 mg Subcutaneous Daily    hydrocortisone sodium succinate PF  100 mg Intravenous Q8H    famotidine (PEPCID) injection  10 mg Intravenous BID    sodium chloride flush  10 mL Intravenous 2 times per day    albuterol sulfate HFA  4 puff Inhalation Q4H    And    ipratropium  4 puff Inhalation Q4H    chlorhexidine  15 mL Mouth/Throat BID    lidocaine PF  5 mL Intradermal See Admin Instructions    sodium chloride flush  10 mL Intravenous 2 times per day    sodium chloride flush  10 mL Intravenous 2 times per day    vitamin D  2,000 Units Oral Daily    atorvastatin  40 mg Oral Daily      Infusions:    dextrose      vasopressin (Septic Shock) infusion 0.04 Units/min (12/07/20 0402)    heparin 5000 units CRRT syringe 1 mL/hr at 12/07/20 0725    prismaSATE BGK 4/2.5 250 mL/hr (12/07/20 0751)    prismaSATE BGK 4/2.5 1,000 mL/hr (12/07/20 0805)    prismaSATE BGK 4/2.5 1,000 mL/hr (12/07/20 1286)    norepinephrine 3 mcg/min (12/06/20 2328)    fentanyl 125 mcg/hr (12/06/20 1809)    propofol 50 mcg/kg/min (12/07/20 0805)     PRN Meds: glucose, 15 g, PRN  dextrose, 12.5 g, PRN  glucagon (rDNA), 1 mg, PRN  dextrose, 100 mL/hr, PRN  heparin (porcine), 2,000 Units, PRN  Artificial Tears, , Q4H PRN  sodium chloride, 2,000 mL, PRN  potassium chloride, 20 mEq, PRN  magnesium sulfate, 1 g, PRN  calcium gluconate, 1 g, PRN    Or  calcium

## 2020-12-07 NOTE — PROGRESS NOTES
Hospitalist Progress Note      Name:  Perez Gordon /Age/Sex: 1945  (76 y.o. male)   MRN & CSN:  [id number] & [id number] Admission Date/Time: 2020  3:03 AM   Location:  -A PCP: Claudine Story MD       Hospital Day:  Wit  Course:   Perez Gordon is a 76 y.o. male who presented with shortness of breath with pulse ox 84-92 on room air. Diagnosed with Covid earlier this week and has been becoming increasingly short of breath. Associated with anxiety and so he presented to the emergency room. Emergency room escalated oxygenation to Vapotherm. He was admitted to the medicine service. Patient was given IV steroids, IV antibiotics. 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 Remdesivir was initiated on  as he was on high flow and qualified; he completed the course x5 days.  he was requiring noninvasive ventilation however was not maintaining his oxygenation and was subsequently intubated. His renal function began to get worse and nephrology was consulted. He began CRRT on . his infection did not appear to be responding to cefepime and so his antibiotics were switched to Merrem and vancomycin; ID was consulted. Blood pressure needed support and so Levophed was initiated on  and vasopressin was eventually added on . His overall health status continued to decline. RotoProne treatment was initiated on . Assessment and Plan:      Acute respiratory failure with hypoxia secondary to COVID-19 viral pneumonia infection   Likely superimposed bacterial pneumonia   SIRS criteria met at time of admission secondary to sepsis from viral pneumonia  - COVID-19 positive: ,   - IV antibiotics, IV steroids initiated on   - Convalescent plasma given: X2 units on   - Remdesivir:   - Oxygen requirement today:   MICHELLE/ATN, metabolic acidosis and respiratory acidosis.   Prerenal   Hyponatremia and hypokalemia, IV replacement   Hypertension, essential    Family want to restart crrt  Reconsidering trach/peg   Attempt to the the patient out of the rotoprone bed     Diet No diet orders on file   DVT Prophylaxis [] Lovenox, []  Heparin, [] SCDs, []No VTE prophylaxis, patient ambulating   GI Prophylaxis [] PPI, [] H2 Blocker, [] No GI prophylaxis, patient is receiving diet/Tube Feeds   Code Status Limited   Disposition Poor prognosis   MDM [] Low, [] Moderate,[x]  High  Patient's risk as above due to     [] One or more chronic illnesses with severe exacerbation or progression    [] Acute or chronic illnesses or injuries that pose a threat to life or bodily function    [] An abrupt change in neurological status    [] Decision not to resuscitate     [x] Drug therapy requiring intensive monitoring for toxicity - iv propofol     Subjective:     Pt S&E. Some problems with clotting   No improvements noted from overnight     No ROS as he is intubated and sedated. Objective: Intake/Output Summary (Last 24 hours) at 12/7/2020 0807  Last data filed at 12/7/2020 0602  Gross per 24 hour   Intake 1583 ml   Output 4008 ml   Net -2425 ml      Vitals:   Vitals:    12/07/20 0731   BP:    Pulse:    Resp:    Temp:    SpO2: 95%     Physical Exam:    GEN sedated male, laying in bed in no apparent distress. EYES Pupils are equally round. No scleral erythema, discharge, or conjunctivitis. HENT Mucous membranes are moist. +ETT +NGT. +edematous face  NECK No apparent thyromegaly or masses. RESP +rales or rhonchi. Symmetric chest movement while on room air. CARDIO/VASC S1/S2 auscultated. Regular rate without appreciable murmurs, rubs, or gallops. Peripheral pulses equal bilaterally and palpable. +2 pitting edema in extremities   GI Abdomen is soft without significant tenderness, masses, or guarding. Bowel sounds are normoactive. Rectal exam deferred.  Joy catheter is present. MSK No gross joint deformities. No spontaneous movement noted  SKIN Normal coloration, warm, dry. NEURO Does not follow directions. No response to verbal or painful stimuli  PSYCH Sedated on propofol.  Fentanyl  Supine in regular medical bed    Medications:   Medications:    insulin lispro  0-18 Units Subcutaneous Q4H    meropenem  1 g Intravenous Q12H    enoxaparin  30 mg Subcutaneous Daily    hydrocortisone sodium succinate PF  100 mg Intravenous Q8H    famotidine (PEPCID) injection  10 mg Intravenous BID    sodium chloride flush  10 mL Intravenous 2 times per day    albuterol sulfate HFA  4 puff Inhalation Q4H    And    ipratropium  4 puff Inhalation Q4H    chlorhexidine  15 mL Mouth/Throat BID    lidocaine PF  5 mL Intradermal See Admin Instructions    sodium chloride flush  10 mL Intravenous 2 times per day    sodium chloride flush  10 mL Intravenous 2 times per day    vitamin D  2,000 Units Oral Daily    atorvastatin  40 mg Oral Daily      Infusions:    dextrose      vasopressin (Septic Shock) infusion 0.04 Units/min (12/07/20 0402)    heparin 5000 units CRRT syringe 1 mL/hr at 12/07/20 0725    prismaSATE BGK 4/2.5 250 mL/hr (12/07/20 0751)    prismaSATE BGK 4/2.5 1,000 mL/hr (12/07/20 0805)    prismaSATE BGK 4/2.5 1,000 mL/hr (12/07/20 9146)    norepinephrine 3 mcg/min (12/06/20 2328)    fentanyl 125 mcg/hr (12/06/20 1809)    propofol 50 mcg/kg/min (12/07/20 0805)     PRN Meds: glucose, 15 g, PRN  dextrose, 12.5 g, PRN  glucagon (rDNA), 1 mg, PRN  dextrose, 100 mL/hr, PRN  heparin (porcine), 2,000 Units, PRN  Artificial Tears, , Q4H PRN  sodium chloride, 2,000 mL, PRN  potassium chloride, 20 mEq, PRN  magnesium sulfate, 1 g, PRN  calcium gluconate, 1 g, PRN    Or  calcium gluconate, 2 g, PRN    Or  calcium gluconate, 3 g, PRN    Or  calcium gluconate, 4 g, PRN  sodium phosphate IVPB, 6 mmol, PRN    Or  sodium phosphate IVPB, 12 mmol, PRN    Or  sodium phosphate IVPB, 18 mmol, PRN    Or  sodium phosphate IVPB, 24 mmol, PRN  sodium chloride flush, 10 mL, PRN  hydrOXYzine, 50 mg, Q6H PRN  sodium chloride, 30 mL, PRN  sodium chloride flush, 10 mL, PRN  sodium chloride flush, 10 mL, PRN  acetaminophen, 650 mg, Q6H PRN    Or  acetaminophen, 650 mg, Q6H PRN  polyethylene glycol, 17 g, Daily PRN  promethazine, 12.5 mg, Q6H PRN    Or  ondansetron, 4 mg, Q6H PRN  guaiFENesin-dextromethorphan, 5 mL, Q4H PRN          Electronically signed by Itzel Mena DO on 12/7/2020 at 8:07 AM

## 2020-12-07 NOTE — PROGRESS NOTES
Hospitalist Progress Note      Name:  Lupis Hernandez /Age/Sex: 1945  (76 y.o. male)   MRN & CSN:  [id number] & [id number] Admission Date/Time: 2020  3:03 AM   Location:  -A PCP: Carlos Gordon MD       Hospital Day: 230 Wit  Course:   Lupis Hernandez is a 76 y.o. male who presented with shortness of breath with pulse ox 84-92 on room air. Diagnosed with Covid earlier this week and has been becoming increasingly short of breath. Associated with anxiety and so he presented to the emergency room. Emergency room escalated oxygenation to Vapotherm. He was admitted to the medicine service. Patient was given IV steroids, IV antibiotics. 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 Remdesivir was initiated on  as he was on high flow and qualified; he completed the course x5 days.  he was requiring noninvasive ventilation however was not maintaining his oxygenation and was subsequently intubated. His renal function began to get worse and nephrology was consulted. He began CRRT on . his infection did not appear to be responding to cefepime and so his antibiotics were switched to Merrem and vancomycin; ID was consulted. Blood pressure needed support and so Levophed was initiated on  and vasopressin was eventually added on . His overall health status continued to decline. RotoProne treatment was initiated on  and was stopped on . Family revisited trach/peg placement and wanted CRRT restarted on .     Assessment and Plan:      Acute respiratory failure with hypoxia secondary to COVID-19 viral pneumonia infection   Likely superimposed bacterial pneumonia   SIRS criteria met at time of admission secondary to sepsis from viral pneumonia  - COVID-19 positive: ,   - IV antibiotics, IV steroids initiated on   - Convalescent plasma given: X2 units on   - Remdesivir:   - Oxygen requirement today:   MICHELLE/ATN, metabolic acidosis and respiratory acidosis. Prerenal   Hyponatremia and hypokalemia, IV replacement   RLE DVT. Concern for LLE DVT on bedside US on 12/6   Hypertension, essential    crrt restarted per family's request  Wife and daughter know this is a grim prognosis  Jannet/wife: [phone]    If they do proceed with trach/peg then consider repeating covid testing so as to move him to the non-covid ICU     Diet No diet orders on file   DVT Prophylaxis [] Lovenox, []  Heparin, [] SCDs, []No VTE prophylaxis, patient ambulating   GI Prophylaxis [] PPI, [] H2 Blocker, [] No GI prophylaxis, patient is receiving diet/Tube Feeds   Code Status Limited   Disposition Poor prognosis   MDM [] Low, [] Moderate,[x]  High  Patient's risk as above due to     [] One or more chronic illnesses with severe exacerbation or progression    [] Acute or chronic illnesses or injuries that pose a threat to life or bodily function    [] An abrupt change in neurological status    [] Decision not to resuscitate     [x] Drug therapy requiring intensive monitoring for toxicity - iv propofol     Subjective:     Pt S&E. Continues to have grim prognosis    No ROS as he is intubated and sedated. Objective: Intake/Output Summary (Last 24 hours) at 12/7/2020 0817  Last data filed at 12/7/2020 0602  Gross per 24 hour   Intake 1583 ml   Output 4008 ml   Net -2425 ml      Vitals:   Vitals:    12/07/20 0731   BP:    Pulse:    Resp:    Temp:    SpO2: 95%     Physical Exam:    GEN sedated male, laying in bed in no apparent distress. EYES Pupils are equally round. No scleral erythema, discharge, or conjunctivitis. HENT Mucous membranes are moist. +ETT +NGT  NECK No apparent thyromegaly or masses. RESP +rales or rhonchi. Symmetric chest movement while on invasive mechanical oxygen support  CARDIO/VASC S1/S2 auscultated. Regular rate without appreciable murmurs, rubs, or gallops.  Peripheral pulses equal bilaterally and palpable. +2 pitting edema in extremities   GI Abdomen is soft without significant tenderness, masses, or guarding. Bowel sounds are normoactive. Rectal exam deferred.  Joy catheter is present. MSK No gross joint deformities. No spontaneous movement noted  SKIN Normal coloration, warm, dry. NEURO Does not follow directions. No response to verbal or painful stimuli  PSYCH Sedated on propofol.     Medications:   Medications:    insulin lispro  0-18 Units Subcutaneous Q4H    meropenem  1 g Intravenous Q12H    enoxaparin  30 mg Subcutaneous Daily    hydrocortisone sodium succinate PF  100 mg Intravenous Q8H    famotidine (PEPCID) injection  10 mg Intravenous BID    sodium chloride flush  10 mL Intravenous 2 times per day    albuterol sulfate HFA  4 puff Inhalation Q4H    And    ipratropium  4 puff Inhalation Q4H    chlorhexidine  15 mL Mouth/Throat BID    lidocaine PF  5 mL Intradermal See Admin Instructions    sodium chloride flush  10 mL Intravenous 2 times per day    sodium chloride flush  10 mL Intravenous 2 times per day    vitamin D  2,000 Units Oral Daily    atorvastatin  40 mg Oral Daily      Infusions:    dextrose      vasopressin (Septic Shock) infusion 0.04 Units/min (12/07/20 0402)    heparin 5000 units CRRT syringe 1 mL/hr at 12/07/20 0725    prismaSATE BGK 4/2.5 250 mL/hr (12/07/20 0751)    prismaSATE BGK 4/2.5 1,000 mL/hr (12/07/20 0805)    prismaSATE BGK 4/2.5 1,000 mL/hr (12/07/20 1865)    norepinephrine 0.96 mcg/min (12/07/20 0800)    fentanyl 125 mcg/hr (12/06/20 1809)    propofol 50 mcg/kg/min (12/07/20 0805)     PRN Meds: glucose, 15 g, PRN  dextrose, 12.5 g, PRN  glucagon (rDNA), 1 mg, PRN  dextrose, 100 mL/hr, PRN  heparin (porcine), 2,000 Units, PRN  Artificial Tears, , Q4H PRN  sodium chloride, 2,000 mL, PRN  potassium chloride, 20 mEq, PRN  magnesium sulfate, 1 g, PRN  calcium gluconate, 1 g, PRN    Or  calcium gluconate, 2 g, PRN    Or  calcium gluconate, 3 g, PRN    Or  calcium gluconate, 4 g, PRN  sodium phosphate IVPB, 6 mmol, PRN    Or  sodium phosphate IVPB, 12 mmol, PRN    Or  sodium phosphate IVPB, 18 mmol, PRN    Or  sodium phosphate IVPB, 24 mmol, PRN  sodium chloride flush, 10 mL, PRN  hydrOXYzine, 50 mg, Q6H PRN  sodium chloride, 30 mL, PRN  sodium chloride flush, 10 mL, PRN  sodium chloride flush, 10 mL, PRN  acetaminophen, 650 mg, Q6H PRN    Or  acetaminophen, 650 mg, Q6H PRN  polyethylene glycol, 17 g, Daily PRN  promethazine, 12.5 mg, Q6H PRN    Or  ondansetron, 4 mg, Q6H PRN  guaiFENesin-dextromethorphan, 5 mL, Q4H PRN          Electronically signed by Juanita Hunt DO on 12/7/2020 at 8:17 AM

## 2020-12-07 NOTE — PROGRESS NOTES
Comprehensive Nutrition Assessment    Type and Reason for Visit:  Reassess    Nutrition Recommendations/Plan:   · Continue NPO    Nutrition Assessment:  Pt EN has been turned off and the pt family is wanting to withdrawal care. Will continue to have EN turned off    Malnutrition Assessment:  Malnutrition Status: At risk for malnutrition (Comment)    Context:  Acute Illness       Estimated Daily Nutrient Needs:  Energy (kcal):  2179; Weight Used for Energy Requirements:  Current     Protein (g):  117; Weight Used for Protein Requirements:  Ideal        Fluid (ml/day):  2179; Method Used for Fluid Requirements:  1 ml/kcal      Wounds:  None       Current Nutrition Therapies:    No diet orders on file    Anthropometric Measures:  · Height: 5' 11\" (180.3 cm)  · Current Body Weight: 221 lb (100.2 kg)   · Admission Body Weight: 212 lb (96.2 kg)    · Usual Body Weight: 215 lb (97.5 kg)     · Ideal Body Weight: 172 lbs; % Ideal Body Weight 123.3 %   · BMI: 30.8   · BMI Categories: Obese Class 2 (BMI 35.0 -39.9)       Nutrition Diagnosis:   · Inadequate oral intake related to impaired respiratory function as evidenced by NPO or clear liquid status due to medical condition, intubation      Nutrition Interventions:   Food and/or Nutrient Delivery:  Start Tube Feeding  Nutrition Education/Counseling:  Education not indicated   Coordination of Nutrition Care:  Continue to monitor while inpatient    Goals:  pt will tolerate the start of EN       Nutrition Monitoring and Evaluation:   Food/Nutrient Intake Outcomes:  Enteral Nutrition Intake/Tolerance  Physical Signs/Symptoms Outcomes:  Biochemical Data, Skin, Weight, Hemodynamic Status     Discharge Planning:     Too soon to determine     Electronically signed by Deysi Gleason RD, LD on 99/0/56 at 6:47 PM EST    Contact: [phone]

## 2020-12-07 NOTE — PROGRESS NOTES
Infectious Disease Progress Note  2020   Patient Name: Jocelyne Nam : 1945       Reason for visit: F/u COVID-19 pneumonia, acute respiratory failure? History:? Interval history noted, in rotoprone bed. No longer in rotoprone bed since   Intubated, sedated and on mechanical ventilation  Physical Exam:  Vital Signs: BP (!) 113/52   Pulse 72   Temp 97.7 °F (36.5 °C) (Rectal)   Resp 22   Ht 5' 11\" (1.803 m)   Wt 233 lb 14.5 oz (106.1 kg)   SpO2 98%   BMI 32.62 kg/m²     Gen: intubated and sedated,   Skin: no stigmata of endocarditis  Wounds: C/D/I  HEMT: AT/NC ETT  Eyes: PERRL   Neck: Supple. Trachea midline. No LAD. Chest: Deferred as use of PAPR does not allow for auscultation. Heart: Deferred as use of PAPR does not allow for auscultation. Abd: soft, non-distended, no tenderness, no hepatomegaly. Normoactive bowel sounds. Ext: no clubbing, cyanosis, or edema  Catheter Site: without erythema or tenderness  LDA: CVC: RA PICC line, Urethral catheter :  Neuro: sedated and on the ventilator. Radiologic / Imaging / TESTING  CXR 2020  Impression:   Left greater than right bilateral airspace opacities are noted without   significant change.         Labs:    Recent Results (from the past 24 hour(s))   POCT Glucose    Collection Time: 20 12:00 PM   Result Value Ref Range    POC Glucose 85 70 - 99 MG/DL   POCT Glucose    Collection Time: 20  2:50 PM   Result Value Ref Range    POC Glucose 107 (H) 70 - 99 MG/DL   Calcium, ionized    Collection Time: 20  4:56 PM   Result Value Ref Range    Ionized Ca 0.96 (L) 1.12 - 1.32 mMOL/L    Calcium, Ion 3.84 (L) 4.48 - 5.28 MG/DL   Critical Care Panel    Collection Time: 20  4:56 PM   Result Value Ref Range    Sodium 128 (L) 135 - 145 MMOL/L    Potassium 4.2 3.5 - 5.1 MMOL/L    Chloride 91 (L) 99 - 110 mMol/L    CO2 20 (L) 21 - 32 MMOL/L    Anion Gap 17 (H) 4 - 16    BUN 65 (H) 6 - 23 MG/DL    CREATININE 3.0 (H) 0.9 - 1.3 MG/DL    Glucose 96 70 - 99 MG/DL    Calcium 7.3 (L) 8.3 - 10.6 MG/DL    GFR Non- 21 (L) >60 mL/min/1.73m2    GFR  25 (L) >60 mL/min/1.73m2    Phosphorus 6.1 (H) 2.5 - 4.9 MG/DL    Magnesium 2.6 (H) 1.8 - 2.4 mg/dl   POCT Glucose    Collection Time: 12/06/20  8:34 PM   Result Value Ref Range    POC Glucose 125 (H) 70 - 99 MG/DL   POCT Glucose    Collection Time: 12/06/20 11:36 PM   Result Value Ref Range    POC Glucose 136 (H) 70 - 99 MG/DL   Critical Care Panel    Collection Time: 12/07/20 12:45 AM   Result Value Ref Range    Sodium 134 (L) 135 - 145 MMOL/L    Potassium 4.4 3.5 - 5.1 MMOL/L    Chloride 96 (L) 99 - 110 mMol/L    CO2 23 21 - 32 MMOL/L    Anion Gap 15 4 - 16    BUN 55 (H) 6 - 23 MG/DL    CREATININE 2.4 (H) 0.9 - 1.3 MG/DL    Glucose 131 (H) 70 - 99 MG/DL    Calcium 7.4 (L) 8.3 - 10.6 MG/DL    GFR Non- 27 (L) >60 mL/min/1.73m2    GFR  32 (L) >60 mL/min/1.73m2    Phosphorus 4.3 2.5 - 4.9 MG/DL    Magnesium 2.5 (H) 1.8 - 2.4 mg/dl   Calcium, ionized    Collection Time: 12/07/20  2:00 AM   Result Value Ref Range    Ionized Ca 0.99 (L) 1.12 - 1.32 mMOL/L    Calcium, Ion 3.96 (L) 4.48 - 5.28 MG/DL   POCT Glucose    Collection Time: 12/07/20  4:14 AM   Result Value Ref Range    POC Glucose 126 (H) 70 - 99 MG/DL   CBC Auto Differential    Collection Time: 12/07/20  5:30 AM   Result Value Ref Range    WBC 16.8 (H) 4.0 - 10.5 K/CU MM    RBC 2.84 (L) 4.6 - 6.2 M/CU MM    Hemoglobin 8.9 (L) 13.5 - 18.0 GM/DL    Hematocrit 26.8 (L) 42 - 52 %    MCV 94.4 78 - 100 FL    MCH 31.3 (H) 27 - 31 PG    MCHC 33.2 32.0 - 36.0 %    RDW 13.7 11.7 - 14.9 %    Platelets 590 (L) 638 - 440 K/CU MM    MPV 11.7 (H) 7.5 - 11.1 FL    Promyelocytes Percent 2 (HH) 0.0 %    Myelocyte Percent 5 (H) 0.0 %    Metamyelocytes Relative 1 (H) 0.0 %    Bands Relative 10 5 - 11 %    Segs Relative 77.0 (H) 36 - 66 %    Lymphocytes % 3.0 (L) 24 - 44 %    Monocytes % 2.0 0 - 4 % Promyelocytes Absolute 0.34 K/CU MM    Myelocytes Absolute 0.84 K/CU MM    Metamyelocytes Absolute 0.17 K/CU MM    Bands Absolute 1.68 K/CU MM    Segs Absolute 13.0 K/CU MM    Lymphocytes Absolute 0.5 K/CU MM    Monocytes Absolute 0.3 K/CU MM    Differential Type MANUAL DIFFERENTIAL     Hypochromia 1+     Polychromasia 1+     Toxic Granulation PRESENT     Smudge Cells PRESENT     Giant PLTs PRESENT    Comprehensive Metabolic Panel w/ Reflex to MG    Collection Time: 12/07/20  5:30 AM   Result Value Ref Range    Sodium 132 (L) 135 - 145 MMOL/L    Potassium 3.9 3.5 - 5.1 MMOL/L    Chloride 94 (L) 99 - 110 mMol/L    CO2 25 21 - 32 MMOL/L    BUN 49 (H) 6 - 23 MG/DL    CREATININE 2.1 (H) 0.9 - 1.3 MG/DL    Glucose 115 (H) 70 - 99 MG/DL    Calcium 7.5 (L) 8.3 - 10.6 MG/DL    Alb 2.5 (L) 3.4 - 5.0 GM/DL    Total Protein 5.2 (L) 6.4 - 8.2 GM/DL    Total Bilirubin 0.5 0.0 - 1.0 MG/DL    ALT 35 10 - 40 U/L    AST 70 (H) 15 - 37 IU/L    Alkaline Phosphatase 151 (H) 40 - 128 IU/L    GFR Non- 31 (L) >60 mL/min/1.73m2    GFR  37 (L) >60 mL/min/1.73m2    Anion Gap 13 4 - 16   Protime-INR    Collection Time: 12/07/20  5:30 AM   Result Value Ref Range    Protime 11.5 (L) 11.7 - 14.5 SECONDS    INR 0.95 INDEX   APTT    Collection Time: 12/07/20  5:30 AM   Result Value Ref Range    aPTT 30.1 25.1 - 37.1 SECONDS   Fibrinogen    Collection Time: 12/07/20  5:30 AM   Result Value Ref Range    Fibrinogen 177 (L) 196.9 - 442.1 MG/DL   D-Dimer, Quantitative    Collection Time: 12/07/20  5:30 AM   Result Value Ref Range    D-Dimer, Quant >5250 (H) <230 NG/mL(DDU)   C-Reactive Protein    Collection Time: 12/07/20  5:30 AM   Result Value Ref Range    CRP, High Sensitivity 86.6 mg/L   Blood gas, arterial    Collection Time: 12/07/20  6:00 AM   Result Value Ref Range    pH, Bld 7.35 7.34 - 7.45    pCO2, Arterial 45.0 32 - 45 MMHG    pO2, Arterial 57 (L) 75 - 100 MMHG    Base Excess 1 0 - 3.3    HCO3, Arterial 24.8 (H) 18 - 23 MMOL/L    CO2 Content 26.2 (H) 19 - 24 MMOL/L    O2 Sat 87.4 (L) 96 - 97 %    Carbon Monoxide, Blood 2.0 0 - 5 %    Methemoglobin, Arterial 0.8 <1.5 %    Comment [id number] .60 P8    Occult blood gastric / duodenum    Collection Time: 12/07/20  6:35 AM   Result Value Ref Range    Occult Blood, Other POSITIVE    POCT Glucose    Collection Time: 12/07/20  8:43 AM   Result Value Ref Range    POC Glucose 100 (H) 70 - 99 MG/DL   Calcium, ionized    Collection Time: 12/07/20  9:00 AM   Result Value Ref Range    Ionized Ca 0.98 (L) 1.12 - 1.32 mMOL/L    Calcium, Ion 3.92 (L) 4.48 - 5.28 MG/DL   POCT Glucose    Collection Time: 12/07/20 11:22 AM   Result Value Ref Range    POC Glucose 106 (H) 70 - 99 MG/DL     CULTURE results: Invalid input(s): BLOOD CULTURE,  URINE CULTURE, SURGICAL CULTURE    Diagnosis:  Patient Active Problem List   Diagnosis    KISHORE on CPAP    Acute respiratory failure with hypoxia (HCC)    MICHELLE (acute kidney injury) ([facility] [address]    Electrolyte imbalance    Isolated proteinuria with minor glomerular abnormality    Volume depletion    Acute kidney injury (MICHELLE) with acute tubular necrosis (ATN) (HCC)    Acidosis    AIVR (accelerated idioventricular rhythm) ([facility])       Active Problems  Active Problems:    Acute respiratory failure with hypoxia (HCC)    MICHELLE (acute kidney injury) (HCC)    Electrolyte imbalance    Isolated proteinuria with minor glomerular abnormality    Volume depletion    Acute kidney injury (MICHELLE) with acute tubular necrosis (ATN) (HCC)    Acidosis    AIVR (accelerated idioventricular rhythm) ([facility])  Resolved Problems:    * No resolved hospital problems. *      Impression and plan   Summary and rationale: Patient is a 76 y.o.   male critical COVID-19 pneumonia with acute kidney injury, transaminitis, Covid associated coagulopathy. On CRRT. C parapsilosis in sputum cx, implies colonization.  Clinical status: leukocytosis is on a dwt, tolerating supine position.  Therapeutic:  o Ongoing antibiotics: Meropenem 11/28- (tentative end-date: 12/11/2020)  o Antiviral agent: remdesivir 11/24-28  o Anti-inflammatory agents:  - Dexamethasone: 11/22-24  - Solu-Medrol: 11/25-12/1  - continue hydrocortisone -12/1  o Completed antibiotics:   o Convalescent plasma receipt:  o Other agents:    Diagnostic: trend CRP and cpt   F/u:    Other: Palliative care on consult.         Electronically signed by: Electronically signed by Patsy Koehler MD on 12/7/2020 at 11:42 AM

## 2020-12-07 NOTE — PROGRESS NOTES
CRRT stated clot in filter. Stopped CRRT and flushed lines. Then ventilator alarming that patient was not getting adequate volumes. RT Jamilah Lo called. Dr. Hossein lynn served and updated. She will talk to Dr. Enoc Dee. CRRT filter is with Heparin syringe and only lasted 10 hours.

## 2020-12-07 NOTE — PROGRESS NOTES
Lab called to make CCP stat that was recently sent. EKG in room appears to have ST elevation. Volumes are better now on vent. Dr. Shaun Romo updated on changes. 12 Lead done and states ST with fusion complexes. Ok to leave CRRT stopped for now. Will heparinize the vas cath. Increased diprivan gtt RR 37.

## 2020-12-07 NOTE — PROGRESS NOTES
Nephrology  Dialysis Note        [address], [address] [address]  Phone: [phone]  Office Hours: 8:30AM - 4:30PM  Monday - Friday          PROCEDURE:  Patient seen during CVVHDF      PHYSICIAN:  OLEG      INDICATION:  Acute tubular necrosis      RX:  See dialysis flowsheet for specifics on access, blood flow rate, dialysate baths, duration of dialysis, anticoagulation and other technical information.       COMMENTS:    CONTINUE CRRT  STOP BICARB GTT    Electronically signed by David Chauhan DO on 12/7/2020 at 8:02 AM    [address],  [address]  [place], [place] [address]  PHONE: [phone]  FAX: [phone]

## 2020-12-07 NOTE — CONSULTS
[facility] Continence Nurse  Consult Note       Brina Josue  AGE: 76 y.o. GENDER: male  : 1945  TODAY'S DATE:  2020    Subjective:     Reason for  Evaluation and Assessment: wound assessment      Brina Josue is a 76 y.o. male referred by:   [x] Physician  [] Nursing  [] Other:     Wound Identification:  Wound Type: traumatic  Contributing Factors: edema, chronic pressure, decreased mobility, obesity, decreased tissue oxygenation and incontinence of stool        PAST MEDICAL HISTORY        Diagnosis Date    GERD (gastroesophageal reflux disease)     Hyperlipidemia     Hypertension        PAST SURGICAL HISTORY    Past Surgical History:   Procedure Laterality Date    APPENDECTOMY      IR NONTUNNELED VASCULAR CATHETER  2020    IR NONTUNNELED VASCULAR CATHETER 2020 [facility] SPECIAL PROCEDURES    IR NONTUNNELED VASCULAR CATHETER  2020    IR NONTUNNELED VASCULAR CATHETER 2020 [facility] SPECIAL PROCEDURES       FAMILY HISTORY    No family history on file. SOCIAL HISTORY    Social History     Tobacco Use    Smoking status: Never Smoker    Smokeless tobacco: Never Used   Substance Use Topics    Alcohol use: Never     Frequency: Never    Drug use: Never       ALLERGIES    No Known Allergies    MEDICATIONS    No current facility-administered medications on file prior to encounter. Current Outpatient Medications on File Prior to Encounter   Medication Sig Dispense Refill    famotidine (PEPCID) 40 MG tablet Take 40 mg by mouth daily      omeprazole 20 MG EC tablet Take 20 mg by mouth daily      losartan-hydrochlorothiazide (HYZAAR) 100-25 MG per tablet Take 1 tablet by mouth daily      atorvastatin (LIPITOR) 40 MG tablet Take 40 mg by mouth daily           Objective:      /62   Pulse 72   Temp 97.5 °F (36.4 °C) (Rectal)   Resp 19   Ht 5' 11\" (1.803 m)   Wt 223 lb 8.7 oz (101.4 kg) Comment: 2 extra pillows taken off  bedand drains.    SpO2 93%   BMI 31.18 kg/m²   Hector Risk Score: Hector Scale Score: 10    LABS    CBC:   Lab Results   Component Value Date    WBC 16.8 12/07/2020    RBC 2.84 12/07/2020    HGB 8.9 12/07/2020    HCT 26.8 12/07/2020    MCV 94.4 12/07/2020    MCH 31.3 12/07/2020    MCHC 33.2 12/07/2020    RDW 13.7 12/07/2020     12/07/2020    MPV 11.7 12/07/2020     CMP:    Lab Results   Component Value Date     12/07/2020    K 3.9 12/07/2020    CL 94 12/07/2020    CO2 25 12/07/2020    BUN 49 12/07/2020    CREATININE 2.1 12/07/2020    GFRAA 37 12/07/2020    LABGLOM 31 12/07/2020    GLUCOSE 115 12/07/2020    PROT 5.2 12/07/2020    LABALBU 2.5 12/07/2020    CALCIUM 7.5 12/07/2020    BILITOT 0.5 12/07/2020    ALKPHOS 151 12/07/2020    AST 70 12/07/2020    ALT 35 12/07/2020     Albumin:    Lab Results   Component Value Date    LABALBU 2.5 12/07/2020     PT/INR:    Lab Results   Component Value Date    PROTIME 11.5 12/07/2020    INR 0.95 12/07/2020     HgBA1c:  No results found for: LABA1C      Assessment:     Patient Active Problem List   Diagnosis    KISHORE on CPAP    Acute respiratory failure with hypoxia (HCC)    MICHELLE [facility] injury) ([facility]    Electrolyte imbalance    Isolated proteinuria with minor glomerular abnormality    Volume depletion    Acute kidney injury (MICHELLE) with acute tubular necrosis (ATN) ([facility])    Acidosis    AIVR (accelerated idioventricular rhythm) ([facility])       Measurements:  Wound 12/03/20 Rib Cage Outer left lateral cluster (Active)   Wound Etiology Traumatic 12/07/20 1007   Dressing Status Dry; Intact 12/07/20 1222   Wound Cleansed Cleansed with saline 12/07/20 1007   Dressing/Treatment Foam 12/07/20 1222   Wound Length (cm) 19 cm 12/07/20 1007   Wound Width (cm) 15 cm 12/07/20 1007   Wound Depth (cm) 0.1 cm 12/07/20 1007   Wound Surface Area (cm^2) 285 cm^2 12/07/20 1007   Wound Volume (cm^3) 28.5 cm^3 12/07/20 1007   Distance Tunneling (cm) 0 cm 12/07/20 1007   Tunneling Position ___ O'Clock 0 12/07/20 1007 Undermining Starts ___ O'Clock 0 12/07/20 1007   Undermining Ends___ O'Clock 0 12/07/20 1007   Undermining Maxium Distance (cm) 0 12/07/20 1007   Wound Assessment Pink/red;Purple/maroon;Slough;Ruptured blister; Other (Comment) 12/07/20 1222   Drainage Amount None 12/07/20 1222   Drainage Description Clear 12/07/20 1007   Odor None 12/07/20 1222   Vioelt-wound Assessment Intact 12/07/20 1007   Margins Defined edges 12/07/20 1007   Number of days: 3       Wound 12/07/20 Left;Lateral abd cluster (Active)   Wound Etiology Other 12/07/20 1007   Dressing Status New dressing applied 12/07/20 1007   Wound Cleansed Cleansed with saline 12/07/20 1007   Wound Length (cm) 4 cm 12/07/20 1007   Wound Width (cm) 13 cm 12/07/20 1007   Wound Depth (cm) 0.1 cm 12/07/20 1007   Wound Surface Area (cm^2) 52 cm^2 12/07/20 1007   Wound Volume (cm^3) 5.2 cm^3 12/07/20 1007   Distance Tunneling (cm) 0 cm 12/07/20 1007   Tunneling Position ___ O'Clock 0 12/07/20 1007   Undermining Starts ___ O'Clock 0 12/07/20 1007   Undermining Ends___ O'Clock 0 12/07/20 1007   Undermining Maxium Distance (cm) 0 12/07/20 1007   Wound Assessment Pink/red 12/07/20 1007   Drainage Amount Small 12/07/20 1007   Drainage Description Clear 12/07/20 1007   Odor None 12/07/20 1007   Violet-wound Assessment Dry/flaky 12/07/20 1007   Margins Attached edges 12/07/20 1007   Number of days: 0       Wound 12/07/20 Pretibial Right;Lateral knee (Active)   Wound Etiology Other 12/07/20 1007   Dressing Status New dressing applied 12/07/20 1007   Wound Cleansed Not Cleansed 12/07/20 1007   Wound Length (cm) 5 cm 12/07/20 1007   Wound Width (cm) 2.5 cm 12/07/20 1007   Wound Depth (cm) 0 cm 12/07/20 1007   Wound Surface Area (cm^2) 12.5 cm^2 12/07/20 1007   Wound Volume (cm^3) 0 cm^3 12/07/20 1007   Distance Tunneling (cm) 0 cm 12/07/20 1007   Tunneling Position ___ O'Clock 0 12/07/20 1007   Undermining Starts ___ O'Clock 0 12/07/20 1007   Undermining Ends___ O'Clock 0 12/07/20 1007   Undermining Maxium Distance (cm) 0 12/07/20 1007   Drainage Amount None 12/07/20 1007   Odor None 12/07/20 1007   Margins Attached edges 12/07/20 1007   Number of days: 0       Wound 12/07/20 Knee Left;Lateral cluster (Active)   Wound Etiology Other 12/07/20 1007   Dressing Status New dressing applied 12/07/20 1007   Wound Cleansed Not Cleansed 12/07/20 1007   Wound Length (cm) 5.5 cm 12/07/20 1007   Wound Width (cm) 3 cm 12/07/20 1007   Wound Depth (cm) 0 cm 12/07/20 1007   Wound Surface Area (cm^2) 16.5 cm^2 12/07/20 1007   Wound Volume (cm^3) 0 cm^3 12/07/20 1007   Distance Tunneling (cm) 0 cm 12/07/20 1007   Tunneling Position ___ O'Clock 0 12/07/20 1007   Undermining Starts ___ O'Clock 0 12/07/20 1007   Undermining Ends___ O'Clock 0 12/07/20 1007   Undermining Maxium Distance (cm) 0 12/07/20 1007   Drainage Amount None 12/07/20 1007   Odor None 12/07/20 1007   Margins Attached edges 12/07/20 1007   Number of days: 0       Wound 12/07/20 Chest Lateral;Right (Active)   Wound Etiology Other 12/07/20 1007   Dressing Status New dressing applied 12/07/20 1007   Wound Cleansed Cleansed with saline 12/07/20 1007   Wound Length (cm) 6 cm 12/07/20 1007   Wound Width (cm) 14.5 cm 12/07/20 1007   Wound Depth (cm) 0 cm 12/07/20 1007   Wound Surface Area (cm^2) 87 cm^2 12/07/20 1007   Wound Volume (cm^3) 0 cm^3 12/07/20 1007   Distance Tunneling (cm) 0 cm 12/07/20 1007   Tunneling Position ___ O'Clock 0 12/07/20 1007   Undermining Starts ___ O'Clock 0 12/07/20 1007   Undermining Ends___ O'Clock 0 12/07/20 1007   Undermining Maxium Distance (cm) 0 12/07/20 1007   Drainage Amount Small 12/07/20 1007   Drainage Description Serous 12/07/20 1007   Odor None 12/07/20 1007   Violet-wound Assessment Blisters 12/07/20 1007   Margins Attached edges 12/07/20 1007   Number of days: 0       Wound 12/07/20 Abdomen Right;Lateral (Active)   Wound Etiology Other 12/07/20 1007   Dressing Status New dressing applied 12/07/20 1007   Wound Cleansed Not Cleansed 12/07/20 1007   Wound Length (cm) 1 cm 12/07/20 1007   Wound Width (cm) 1.2 cm 12/07/20 1007   Wound Depth (cm) 0 cm 12/07/20 1007   Wound Surface Area (cm^2) 1.2 cm^2 12/07/20 1007   Wound Volume (cm^3) 0 cm^3 12/07/20 1007   Distance Tunneling (cm) 0 cm 12/07/20 1007   Tunneling Position ___ O'Clock 0 12/07/20 1007   Undermining Starts ___ O'Clock 0 12/07/20 1007   Undermining Ends___ O'Clock 0 12/07/20 1007   Undermining Maxium Distance (cm) 0 12/07/20 1007   Drainage Amount None 12/07/20 1007   Odor None 12/07/20 1007   Margins Attached edges 12/07/20 [facility]   Number of days: 0       Response to treatment:  Well tolerated by patient. Pain Assessment:  Severity:  none  Quality of pain: na  Wound Pain Timing/Severity: na  Premedicated: no    Plan:     Plan of Care: Wound 12/03/20 Rib Cage Outer left lateral cluster-Dressing/Treatment: Foam  Wound 12/07/20 Left;Lateral abd cluster-Dressing/Treatment: (opt foam ag)  Wound 12/07/20 Pretibial Right;Lateral knee-Dressing/Treatment: (optifoam border)  Wound 12/07/20 Knee Left;Lateral cluster-Dressing/Treatment: (optifoam border)  Wound 12/07/20 Chest Lateral;Right-Dressing/Treatment: (optifoam ag )  Wound 12/07/20 Abdomen Right;Lateral-Dressing/Treatment: (opt border)     Pt in bed. Intubated. Nurse with pt. Multiple clusters of tension blisters noted as above from rotoprone bed. Pictures and measurements taken. Treatment applied as above. Bilateral heels intact with silicone border gauze on for prevention. Buttock intact. Positioned to right side with heels floated with pillow support. Pt is at high risk for skin breakdown AEB Hector. Follow Hector orders.      Specialty Bed Required : yes  [x] Low Air Loss   [x] Pressure Redistribution  [] Fluid Immersion  [] Bariatric  [] Total Pressure Relief  [] Other:     Discharge Plan:  Placement for patient upon discharge: tbd  Hospice Care: no  Patient appropriate for [address]:

## 2020-12-07 NOTE — PROGRESS NOTES
Spouse called per  request to see if wanting to withdrawal care today or tomorrow am. She is not wanting to make a decision until she speaks with Dr. Hazel Leon. Waiting to see what to do with CRRT at this time. RT Doyle Re here suctioning and saline instilling pt. Still not getting volumes like before.

## 2020-12-07 NOTE — PROGRESS NOTES
Dr. Santo Camacho here seeing pt and is ordering a SAT trial in the am. Will order IV Protonix instead of IV Pepcid.

## 2020-12-07 NOTE — PROGRESS NOTES
CRRT running with new filter since 0800. Access is alarming extremely negative. Flushed lumen with saline. Pt repositioned with wound care. VSS. Levophed gtt off and no wean parameters noted for vasopressin.

## 2020-12-07 NOTE — PROGRESS NOTES
Pulmonary and Critical Care  Progress Note      VITALS:  BP (!) 113/52   Pulse 72   Temp 97.7 °F (36.5 °C) (Rectal)   Resp 22   Ht 5' 11\" (1.803 m)   Wt 233 lb 14.5 oz (106.1 kg)   SpO2 98%   BMI 32.62 kg/m²     Subjective:   CHIEF COMPLAINT :SOB     HPI:                The patient is on the vent and sedated. He has minimal response to painful stimuli    Objective:   PHYSICAL EXAM:    LUNGS:Bilateral basal crackles  Abd-soft, BS+,NT  Ext -No pedal edema  CVS-s1s2, no murmurs      DATA:    CBC:  Recent Labs     12/05/20  0600 12/06/20  0550 12/07/20  0530   WBC 18.5* 18.8* 16.8*   RBC 2.97* 2.78* 2.84*   HGB 9.2* 9.0* 8.9*   HCT 28.7* 27.3* 26.8*   PLT 62* 88* 104*   MCV 96.6 98.2 94.4   MCH 31.0 32.4* 31.3*   MCHC 32.1 33.0 33.2   RDW 13.9 14.1 13.7   SEGSPCT 83.7* 76.0* 77.0*   BANDSPCT  --  13* 10      BMP:  Recent Labs     12/06/20  1656 12/07/20  0045 12/07/20  0530   * 134* 132*   K 4.2 4.4 3.9   CL 91* 96* 94*   CO2 20* 23 25   BUN 65* 55* 49*   CREATININE 3.0* 2.4* 2.1*   CALCIUM 7.3* 7.4* 7.5*   GLUCOSE 96 131* 115*      ABG:  Recent Labs     12/05/20  0800 12/06/20  0600 12/07/20  0600   PH 7.36 7.26* 7.35   PO2ART 56* 125* 57*   TZJ1RPV 35.0 39.0 45.0   O2SAT 88.4* 95.8* 87.4*     BNP  No results found for: BNP   D-Dimer:  Lab Results   Component Value Date    DDIMER >5250 (H) 12/07/2020      1. Radiology:   Diffuse parenchymal infiltrates are again identified throughout the lungs    bilaterally, without significant interval change.           Assessment/Plan     Patient Active Problem List    Diagnosis Date Noted    AIVR (accelerated idioventricular rhythm) (HCC)     Acute kidney injury (MICHELLE) with acute tubular necrosis (ATN) (HCC)     Acidosis     [facility]     Electrolyte imbalance     Isolated proteinuria with minor glomerular abnormality     Volume depletion     Acute respiratory failure with hypoxia ([facility] 11/22/2020    KISHORE on CPAP 06/19/2017   Acute hypoxic resp failure sec to non Cardiogenic Pulmonary edema  VDRF  Leukocytosis - improving  Bilateral Pneumonia  Non Calcified right sided Pulmonary nodules  COVID Pneumonia  MICHELLE- improving  AG Metabolic acidosis  Moderate malnutrition  UGI bleed       1. Abx  2. Keep sats > 88%  3. Tube feeds  4. Inhalers  5. CRRT per renal  6. Prognosis guarded  7. C.w present management  8. SAT and SBT trial in am  9. PPI BID  10. F/u H&H  No follow-ups on file.     Electronically signed by Jyotsna Santoyo MD on 12/7/2020 at 12:13 PM

## 2020-12-07 NOTE — PROGRESS NOTES
12/07/20 0442   Vent Information   Vent Type 980   Vent Mode AC/PC   Vt Ordered 0 mL   Pressure Ordered 20   Rate Set 16 bmp   Peak Flow 0 L/min   Pressure Support 0 cmH20   FiO2  60 %   SpO2 95 %   SpO2/FiO2 ratio 158.33   Sensitivity 3   PEEP/CPAP 9   I Time/ I Time % 1 s   Humidification Source HME   Nitric Oxide/Epoprostenol In Use? No   Vent Patient Data   High Peep/I Pressure 20   Peak Inspiratory Pressure 32 cmH2O   Mean Airway Pressure 18 cmH20   Rate Measured 19 br/min   Vt Exhaled 950 mL   Minute Volume 16.7 Liters   I:E Ratio 1:1.70   Cough/Sputum   Sputum How Obtained Suctioned;Endotracheal   $Obtained Sample $Induced Sputum   Cough Productive;Moist   Frequency Occasional   Sputum Amount Small   Sputum Color Cloudy; White   Tenacity Thick   Spontaneous Breathing Trial (SBT) RT Doc   Pulse 87   Breath Sounds   Right Upper Lobe Diminished   Right Middle Lobe Diminished   Right Lower Lobe Diminished   Left Upper Lobe Diminished   Left Lower Lobe Diminished   Additional Respiratory  Assessments   Resp 20   Position Supine   Oral Care Completed? No   Alarm Settings   High Pressure Alarm 45 cmH2O   Delay Alarm 20 sec(s)   Low Minute Volume Alarm 2.5 L/min   Apnea (secs) 20 secs   High Respiratory Rate 45 br/min   Low Exhaled Vt  250 mL   Patient Observation   Observations Pt resting i bed   ETT (adult)   Placement Date/Time: 11/25/20 1750   Timeout: Patient;Procedure; Appropriate Equipment  Preoxygenation: Yes  Tube Size: 7.5 mm  Location: Oral  Insertion attempts: 1  Secured at: (c) 24 cm  Placed By: (c) Other (Comment)   Secured at 26 cm   Measured From [address] [place] [address],[address] By [employer] tube jones   Site Condition Dry

## 2020-12-07 NOTE — PROGRESS NOTES
Labs drawn and sent per order. toño flex machine #4 failed self test x 2 [employer] help line called. Running self test one more time after cleaning and drying pods. Then must give back blood and start with a new filter.

## 2020-12-07 NOTE — PROGRESS NOTES
Patient gastric content is now dark red/coffee ground in appearence. Hospaltist notified of change and orders placed. Gastric occult was sent, order placed to repeat hemoglobin in 6 hours.

## 2020-12-08 ENCOUNTER — APPOINTMENT (OUTPATIENT)
Dept: GENERAL RADIOLOGY | Age: 75
DRG: 870 | End: 2020-12-08
Attending: INTERNAL MEDICINE
Payer: MEDICARE

## 2020-12-08 LAB
EKG ATRIAL RATE: 137 BPM
EKG DIAGNOSIS: NORMAL
EKG P AXIS: 53 DEGREES
EKG P-R INTERVAL: 124 MS
EKG Q-T INTERVAL: 282 MS
EKG QRS DURATION: 74 MS
EKG QTC CALCULATION (BAZETT): 425 MS
EKG R AXIS: 73 DEGREES
EKG T AXIS: 45 DEGREES
EKG VENTRICULAR RATE: 137 BPM

## 2020-12-08 NOTE — PROGRESS NOTES
Pt extubated at this time with this nurse and Effie, RN. Both nurses at bedside with pt. Ativan 1mg given for comfort.    2302 HR 17 SPO2 0 both nurses at bedside. 2307 heart stopped, this nurse and Effie verified. All gtts stopped, monitor turned off, family notified.

## 2020-12-08 NOTE — DISCHARGE SUMMARY
Discharge Summary    Name:  Chuckie Mtz /Age/Sex: 1945  (76 y.o. male)   MRN & CSN:  [id number] & [id number] Admission Date/Time: 2020  3:03 AM   Attending:  No att. providers found Discharging Physician: Kyree Foley Course:   Chuckie Mtz is a 76 y.o. male who presented with shortness of breath with pulse ox 84-92 on room air. Diagnosed with Covid earlier this week and has been becoming increasingly short of breath. Associated with anxiety and so he presented to the emergency room. Emergency room escalated oxygenation to Vapotherm. He was admitted to the medicine service. Patient was given IV steroids, IV antibiotics. 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 Remdesivir was initiated on  as he was on high flow and qualified; he completed the course x5 days.  he was requiring noninvasive ventilation however was not maintaining his oxygenation and was subsequently intubated. His renal function began to get worse and nephrology was consulted. He began CRRT on . his infection did not appear to be responding to cefepime and so his antibiotics were switched to Merrem and vancomycin; ID was consulted. Blood pressure needed support and so Levophed was initiated on  and vasopressin was eventually added on . His overall health status continued to decline. RotoProne treatment was initiated on  and was stopped on . Family revisited trach/peg placement and wanted CRRT restarted on . CRRT was complicated by his hypercoagulability and when his heparin was increased he began bleeding from his NGT. On the day of his death this physician called wife/Jannet to discuss his impending passing. She continued to want no interventions but was also struggling with the soon to be loss of her .     He  at 2307     Assessment and Plan:      · Acute respiratory failure with hypoxia secondary to COVID-19 viral pneumonia infection  · Likely superimposed bacterial pneumonia  · SIRS criteria met at time of admission secondary to sepsis from viral pneumonia  § COVID-19 positive: 11/14, 11/18  § IV antibiotics, IV steroids initiated on 11/23  § Convalescent plasma given: X2 units on 11/23  § Remdesivir: 11/24  § Oxygen requirement today:  · MICHELLE/ATN, metabolic acidosis and respiratory acidosis. Prerenal  · Hyponatremia and hypokalemia, IV replacement  · RLE DVT. Concern for LLE DVT on bedside US on 12/6  · Hypertension, essential      The patient expressed appropriate understanding of and agreement with the discharge recommendations, medications, and plan. Consults this admission:  IP CONSULT TO PHARMACY  IP CONSULT TO PHARMACY  IP CONSULT TO PULMONOLOGY  IP CONSULT TO PULMONOLOGY  IP CONSULT TO DIETITIAN  IP CONSULT TO DIETITIAN  IP CONSULT TO NEPHROLOGY  IP CONSULT TO INFECTIOUS DISEASES  IP CONSULT TO ANESTHESIOLOGY  IP CONSULT TO INTERVENTIONAL RADIOLOGY  IP CONSULT TO PHARMACY  IP CONSULT TO CARDIOLOGY  IP CONSULT TO PALLIATIVE CARE  IP CONSULT TO [address] [place] [address]    Discharge Instruction:   Follow up appointments: none      Discharge Medications:      [facility] Medication Instructions GQI:[id number]    Printed on:12/08/20 1032   Medication Information                      atorvastatin (LIPITOR) 40 MG tablet  Take 40 mg by mouth daily             famotidine (PEPCID) 40 MG tablet  Take 40 mg by mouth daily             losartan-hydrochlorothiazide (HYZAAR) 100-25 MG per tablet  Take 1 tablet by mouth daily             omeprazole 20 MG EC tablet  Take 20 mg by mouth daily                 Objective Findings at Discharge:   BP (!) 110/52   Pulse 76   Temp 98.2 °F (36.8 °C) (Rectal)   Resp 22   Ht 5' 11\" (1.803 m)   Wt 223 lb 8.7 oz (101.4 kg) Comment: 2 extra pillows taken off  bedand drains.    SpO2 100%   BMI 31.18 kg/m²            PHYSICAL EXAM   Monitor showed asystole with zero respirations. The patient was unresponsive to verbal and tactile stimuli. On physical exam, no heart sounds or pulses were noted; No spontaneous respirations were observed. The patient's  pupils were fixed and dilated at mid-position and were not reactive to light. BMP/CBC  Recent Labs     12/06/20  0550  12/07/20  0045 12/07/20  0530 12/07/20  1720   *   < > 134* 132* 132*   K 4.7   < > 4.4 3.9 3.6   CL 93*   < > 96* 94* 94*   CO2 18*   < > 23 25 26   BUN 73*   < > 55* 49* 41*   CREATININE 3.0*   < > 2.4* 2.1* 1.8*   WBC 18.8*  --   --  16.8*  --    HCT 27.3*  --   --  26.8* 25.3*   PLT 88*  --   --  104*  --     < > = values in this interval not displayed.      SIGNIFICANT IMAGING AND LABS:  none    Discharge Time of 36 minutes    Electronically signed by Gina Augustine DO on 12/8/2020 at 10:32 AM

## 2020-12-08 NOTE — PROGRESS NOTES
Call initiated by: Nursing staff:   Cecilia Reynoso  Call addressed around: 2020 11:51 PM      Reason for call: Patient  2020 @ 4502      Orders placed: none        YANET Oden CNP

## 2020-12-08 NOTE — PROGRESS NOTES
Daughter Richard Maxim called and wants to zoom tonight if possible. Number taken and email address.

## 2020-12-08 NOTE — PROGRESS NOTES
Wife Harshad Ontiveros) and daughter Kj Caldwell) were called to upon their request. The family will like to withdraw care at this time. They have spoken with the family physician earlier today for second opinion and he is of the belief that the patient will not recover. Family wants patient changed to DNR CC. They would like terminal weaning and comfort care measures initiated. They would like to be notified when patient expires. Family have arrangements with [facility] in [facility]. Nursing notified to initiate terminal weaning and notify family of time of death.
